# Patient Record
Sex: FEMALE | Race: WHITE | Employment: UNEMPLOYED
[De-identification: names, ages, dates, MRNs, and addresses within clinical notes are randomized per-mention and may not be internally consistent; named-entity substitution may affect disease eponyms.]

---

## 2017-02-21 ENCOUNTER — TELEPHONE (OUTPATIENT)
Dept: FAMILY MEDICINE CLINIC | Facility: CLINIC | Age: 57
End: 2017-02-21

## 2017-03-01 RX ORDER — DICYCLOMINE HYDROCHLORIDE 10 MG/1
10 CAPSULE ORAL EVERY 6 HOURS PRN
Qty: 20 CAPSULE | Refills: 0 | Status: CANCELLED | OUTPATIENT
Start: 2017-03-01

## 2017-03-07 ENCOUNTER — OFFICE VISIT (OUTPATIENT)
Dept: FAMILY MEDICINE CLINIC | Facility: CLINIC | Age: 57
End: 2017-03-07

## 2017-03-07 VITALS
TEMPERATURE: 98.8 F | DIASTOLIC BLOOD PRESSURE: 70 MMHG | BODY MASS INDEX: 32.65 KG/M2 | WEIGHT: 208 LBS | HEIGHT: 67 IN | HEART RATE: 60 BPM | OXYGEN SATURATION: 96 % | RESPIRATION RATE: 16 BRPM | SYSTOLIC BLOOD PRESSURE: 100 MMHG

## 2017-03-07 DIAGNOSIS — K05.10 GINGIVITIS, CHRONIC: ICD-10-CM

## 2017-03-07 DIAGNOSIS — M54.42 CHRONIC BILATERAL LOW BACK PAIN WITH BILATERAL SCIATICA: Primary | ICD-10-CM

## 2017-03-07 DIAGNOSIS — F32.A DEPRESSION, UNSPECIFIED DEPRESSION TYPE: ICD-10-CM

## 2017-03-07 DIAGNOSIS — F41.1 GENERALIZED ANXIETY DISORDER: Chronic | ICD-10-CM

## 2017-03-07 DIAGNOSIS — G89.29 CHRONIC BILATERAL LOW BACK PAIN WITH BILATERAL SCIATICA: Primary | ICD-10-CM

## 2017-03-07 DIAGNOSIS — M54.41 CHRONIC BILATERAL LOW BACK PAIN WITH BILATERAL SCIATICA: Primary | ICD-10-CM

## 2017-03-07 DIAGNOSIS — R10.84 GENERALIZED ABDOMINAL PAIN: ICD-10-CM

## 2017-03-07 PROCEDURE — 99214 OFFICE O/P EST MOD 30 MIN: CPT | Performed by: FAMILY MEDICINE

## 2017-03-07 RX ORDER — DICYCLOMINE HYDROCHLORIDE 10 MG/1
10 CAPSULE ORAL 3 TIMES DAILY PRN
Qty: 90 CAPSULE | Refills: 2 | Status: SHIPPED | OUTPATIENT
Start: 2017-03-07 | End: 2017-05-11 | Stop reason: SDUPTHER

## 2017-03-07 RX ORDER — TRAZODONE HYDROCHLORIDE 100 MG/1
100 TABLET ORAL
COMMUNITY
End: 2017-04-13 | Stop reason: SDUPTHER

## 2017-03-07 RX ORDER — DICYCLOMINE HCL 20 MG
20 TABLET ORAL
COMMUNITY
End: 2017-03-07 | Stop reason: SDUPTHER

## 2017-03-07 RX ORDER — SERTRALINE HYDROCHLORIDE 100 MG/1
100 TABLET, FILM COATED ORAL DAILY
COMMUNITY
End: 2017-05-11 | Stop reason: SDUPTHER

## 2017-03-07 RX ORDER — TIZANIDINE 4 MG/1
4 TABLET ORAL EVERY 8 HOURS PRN
COMMUNITY
End: 2017-05-11 | Stop reason: SDUPTHER

## 2017-03-07 RX ORDER — CLINDAMYCIN HYDROCHLORIDE 150 MG/1
1 CAPSULE ORAL 3 TIMES DAILY
COMMUNITY
Start: 2017-02-21 | End: 2017-03-07 | Stop reason: ALTCHOICE

## 2017-03-07 RX ORDER — DICYCLOMINE HCL 20 MG
20 TABLET ORAL 3 TIMES DAILY PRN
Qty: 90 TABLET | Refills: 2 | Status: SHIPPED | OUTPATIENT
Start: 2017-03-07 | End: 2017-05-11 | Stop reason: SDUPTHER

## 2017-03-07 RX ORDER — DICYCLOMINE HYDROCHLORIDE 10 MG/1
CAPSULE ORAL
COMMUNITY
Start: 2017-02-22 | End: 2017-03-07 | Stop reason: SDUPTHER

## 2017-03-07 ASSESSMENT — PATIENT HEALTH QUESTIONNAIRE - PHQ9
2. FEELING DOWN, DEPRESSED OR HOPELESS: 3
7. TROUBLE CONCENTRATING ON THINGS, SUCH AS READING THE NEWSPAPER OR WATCHING TELEVISION: 3
6. FEELING BAD ABOUT YOURSELF - OR THAT YOU ARE A FAILURE OR HAVE LET YOURSELF OR YOUR FAMILY DOWN: 0
1. LITTLE INTEREST OR PLEASURE IN DOING THINGS: 3
10. IF YOU CHECKED OFF ANY PROBLEMS, HOW DIFFICULT HAVE THESE PROBLEMS MADE IT FOR YOU TO DO YOUR WORK, TAKE CARE OF THINGS AT HOME, OR GET ALONG WITH OTHER PEOPLE: 1
5. POOR APPETITE OR OVEREATING: 3
8. MOVING OR SPEAKING SO SLOWLY THAT OTHER PEOPLE COULD HAVE NOTICED. OR THE OPPOSITE, BEING SO FIGETY OR RESTLESS THAT YOU HAVE BEEN MOVING AROUND A LOT MORE THAN USUAL: 3
SUM OF ALL RESPONSES TO PHQ QUESTIONS 1-9: 21
3. TROUBLE FALLING OR STAYING ASLEEP: 3
4. FEELING TIRED OR HAVING LITTLE ENERGY: 3
9. THOUGHTS THAT YOU WOULD BE BETTER OFF DEAD, OR OF HURTING YOURSELF: 0
SUM OF ALL RESPONSES TO PHQ9 QUESTIONS 1 & 2: 6

## 2017-03-07 ASSESSMENT — ENCOUNTER SYMPTOMS
BLOOD IN STOOL: 0
COUGH: 0
BACK PAIN: 1
TROUBLE SWALLOWING: 0
RHINORRHEA: 0
SHORTNESS OF BREATH: 0
ABDOMINAL PAIN: 1
NAUSEA: 1
DIARRHEA: 1
WHEEZING: 0
CONSTIPATION: 0
SORE THROAT: 0
VOMITING: 0

## 2017-04-04 ENCOUNTER — OFFICE VISIT (OUTPATIENT)
Dept: GASTROENTEROLOGY | Age: 57
End: 2017-04-04
Payer: COMMERCIAL

## 2017-04-04 VITALS
BODY MASS INDEX: 32.49 KG/M2 | WEIGHT: 207 LBS | OXYGEN SATURATION: 93 % | TEMPERATURE: 97.3 F | HEIGHT: 67 IN | RESPIRATION RATE: 14 BRPM | DIASTOLIC BLOOD PRESSURE: 74 MMHG | HEART RATE: 64 BPM | SYSTOLIC BLOOD PRESSURE: 117 MMHG

## 2017-04-04 DIAGNOSIS — R10.32 LEFT LOWER QUADRANT PAIN: ICD-10-CM

## 2017-04-04 DIAGNOSIS — R10.10 PAIN OF UPPER ABDOMEN: Primary | ICD-10-CM

## 2017-04-04 DIAGNOSIS — R63.4 WEIGHT LOSS: ICD-10-CM

## 2017-04-04 DIAGNOSIS — R93.5 ABNORMAL ABDOMINAL CT SCAN: ICD-10-CM

## 2017-04-04 DIAGNOSIS — Z12.11 COLON CANCER SCREENING: ICD-10-CM

## 2017-04-04 PROCEDURE — 99244 OFF/OP CNSLTJ NEW/EST MOD 40: CPT | Performed by: INTERNAL MEDICINE

## 2017-04-04 ASSESSMENT — ENCOUNTER SYMPTOMS
ABDOMINAL DISTENTION: 1
CONSTIPATION: 0
ABDOMINAL PAIN: 1
ANAL BLEEDING: 0
NAUSEA: 1
DIARRHEA: 1
ALLERGIC/IMMUNOLOGIC NEGATIVE: 1
RESPIRATORY NEGATIVE: 1
BLOOD IN STOOL: 0
RECTAL PAIN: 0
BACK PAIN: 1
EYES NEGATIVE: 1
VOMITING: 0
SINUS PRESSURE: 1

## 2017-04-05 ENCOUNTER — TELEPHONE (OUTPATIENT)
Dept: FAMILY MEDICINE CLINIC | Age: 57
End: 2017-04-05

## 2017-04-05 DIAGNOSIS — M54.41 CHRONIC BILATERAL LOW BACK PAIN WITH BILATERAL SCIATICA: Primary | ICD-10-CM

## 2017-04-05 DIAGNOSIS — M54.42 CHRONIC BILATERAL LOW BACK PAIN WITH BILATERAL SCIATICA: Primary | ICD-10-CM

## 2017-04-05 DIAGNOSIS — G89.29 CHRONIC BILATERAL LOW BACK PAIN WITH BILATERAL SCIATICA: Primary | ICD-10-CM

## 2017-04-05 RX ORDER — POLYETHYLENE GLYCOL 3350 17 G/17G
POWDER, FOR SOLUTION ORAL
Qty: 255 G | Refills: 0 | Status: SHIPPED | OUTPATIENT
Start: 2017-04-05 | End: 2017-05-01

## 2017-04-06 PROBLEM — G89.29 CHRONIC BILATERAL LOW BACK PAIN WITH BILATERAL SCIATICA: Status: ACTIVE | Noted: 2017-04-06

## 2017-04-06 PROBLEM — M54.42 CHRONIC BILATERAL LOW BACK PAIN WITH BILATERAL SCIATICA: Status: ACTIVE | Noted: 2017-04-06

## 2017-04-06 PROBLEM — M54.41 CHRONIC BILATERAL LOW BACK PAIN WITH BILATERAL SCIATICA: Status: ACTIVE | Noted: 2017-04-06

## 2017-04-13 ENCOUNTER — OFFICE VISIT (OUTPATIENT)
Dept: FAMILY MEDICINE CLINIC | Age: 57
End: 2017-04-13
Payer: COMMERCIAL

## 2017-04-13 ENCOUNTER — HOSPITAL ENCOUNTER (OUTPATIENT)
Age: 57
Setting detail: SPECIMEN
Discharge: HOME | End: 2017-04-13
Payer: COMMERCIAL

## 2017-04-13 VITALS
HEART RATE: 64 BPM | OXYGEN SATURATION: 95 % | HEIGHT: 67 IN | BODY MASS INDEX: 32.65 KG/M2 | RESPIRATION RATE: 14 BRPM | TEMPERATURE: 97.9 F | SYSTOLIC BLOOD PRESSURE: 136 MMHG | WEIGHT: 208 LBS | DIASTOLIC BLOOD PRESSURE: 86 MMHG

## 2017-04-13 DIAGNOSIS — G47.00 INSOMNIA, UNSPECIFIED TYPE: ICD-10-CM

## 2017-04-13 DIAGNOSIS — R31.29 MICROSCOPIC HEMATURIA: ICD-10-CM

## 2017-04-13 DIAGNOSIS — N30.00 ACUTE CYSTITIS WITHOUT HEMATURIA: Primary | ICD-10-CM

## 2017-04-13 DIAGNOSIS — R30.0 DYSURIA: ICD-10-CM

## 2017-04-13 LAB
BILIRUBIN, POC: NEGATIVE
BLOOD URINE, POC: ABNORMAL
CLARITY, POC: ABNORMAL
COLOR, POC: ABNORMAL
GLUCOSE URINE, POC: NEGATIVE
KETONES, POC: NEGATIVE
LEUKOCYTE EST, POC: ABNORMAL
NITRITE, POC: NEGATIVE
PH, POC: 7
PROTEIN, POC: ABNORMAL
SPECIFIC GRAVITY, POC: 1
UROBILINOGEN, POC: NEGATIVE

## 2017-04-13 PROCEDURE — 81002 URINALYSIS NONAUTO W/O SCOPE: CPT | Performed by: FAMILY MEDICINE

## 2017-04-13 PROCEDURE — 99214 OFFICE O/P EST MOD 30 MIN: CPT | Performed by: FAMILY MEDICINE

## 2017-04-13 RX ORDER — TRAZODONE HYDROCHLORIDE 100 MG/1
100 TABLET ORAL NIGHTLY
Qty: 30 TABLET | Refills: 1 | Status: SHIPPED | OUTPATIENT
Start: 2017-04-13 | End: 2017-05-11 | Stop reason: SDUPTHER

## 2017-04-13 RX ORDER — SULFAMETHOXAZOLE AND TRIMETHOPRIM 800; 160 MG/1; MG/1
1 TABLET ORAL 2 TIMES DAILY
Qty: 20 TABLET | Refills: 0 | Status: SHIPPED | OUTPATIENT
Start: 2017-04-13 | End: 2017-04-23

## 2017-04-13 ASSESSMENT — ENCOUNTER SYMPTOMS
ABDOMINAL PAIN: 1
WHEEZING: 0
VOMITING: 0
COUGH: 0
SHORTNESS OF BREATH: 0
NAUSEA: 1
CONSTIPATION: 0
DIARRHEA: 1
BACK PAIN: 1

## 2017-04-15 LAB
CULTURE: ABNORMAL
CULTURE: ABNORMAL
Lab: ABNORMAL
ORGANISM: ABNORMAL
SPECIMEN DESCRIPTION: ABNORMAL
STATUS: ABNORMAL

## 2017-04-16 ASSESSMENT — ENCOUNTER SYMPTOMS
RHINORRHEA: 0
SHORTNESS OF BREATH: 0
WHEEZING: 0
BLOOD IN STOOL: 1
CONSTIPATION: 0
NAUSEA: 1
ABDOMINAL PAIN: 1
VOMITING: 0
SORE THROAT: 0
BACK PAIN: 1
DIARRHEA: 1
COUGH: 0

## 2017-04-24 ENCOUNTER — ANESTHESIA EVENT (OUTPATIENT)
Dept: OPERATING ROOM | Facility: CLINIC | Age: 57
End: 2017-04-24
Payer: COMMERCIAL

## 2017-04-24 ENCOUNTER — HOSPITAL ENCOUNTER (OUTPATIENT)
Age: 57
Setting detail: SPECIMEN
Discharge: HOME | End: 2017-04-24
Payer: COMMERCIAL

## 2017-04-24 ENCOUNTER — ANESTHESIA (OUTPATIENT)
Dept: OPERATING ROOM | Facility: CLINIC | Age: 57
End: 2017-04-24
Payer: COMMERCIAL

## 2017-04-24 ENCOUNTER — HOSPITAL ENCOUNTER (OUTPATIENT)
Facility: CLINIC | Age: 57
Setting detail: OUTPATIENT SURGERY
Discharge: HOME | End: 2017-04-24
Attending: INTERNAL MEDICINE | Admitting: INTERNAL MEDICINE
Payer: COMMERCIAL

## 2017-04-24 VITALS
RESPIRATION RATE: 16 BRPM | SYSTOLIC BLOOD PRESSURE: 100 MMHG | DIASTOLIC BLOOD PRESSURE: 47 MMHG | OXYGEN SATURATION: 97 %

## 2017-04-24 VITALS
RESPIRATION RATE: 16 BRPM | SYSTOLIC BLOOD PRESSURE: 128 MMHG | BODY MASS INDEX: 28.79 KG/M2 | WEIGHT: 190 LBS | HEART RATE: 62 BPM | DIASTOLIC BLOOD PRESSURE: 68 MMHG | HEIGHT: 68 IN | OXYGEN SATURATION: 96 % | TEMPERATURE: 97.9 F

## 2017-04-24 PROCEDURE — 2500000003 HC RX 250 WO HCPCS: Performed by: NURSE ANESTHETIST, CERTIFIED REGISTERED

## 2017-04-24 PROCEDURE — 2580000003 HC RX 258: Performed by: ANESTHESIOLOGY

## 2017-04-24 PROCEDURE — 3609010300 HC COLONOSCOPY W/BIOPSY SINGLE/MULTIPLE: Performed by: INTERNAL MEDICINE

## 2017-04-24 PROCEDURE — 7100000010 HC PHASE II RECOVERY - FIRST 15 MIN: Performed by: INTERNAL MEDICINE

## 2017-04-24 PROCEDURE — 6360000002 HC RX W HCPCS: Performed by: NURSE ANESTHETIST, CERTIFIED REGISTERED

## 2017-04-24 PROCEDURE — 7100000000 HC PACU RECOVERY - FIRST 15 MIN: Performed by: INTERNAL MEDICINE

## 2017-04-24 PROCEDURE — 3700000001 HC ADD 15 MINUTES (ANESTHESIA): Performed by: INTERNAL MEDICINE

## 2017-04-24 PROCEDURE — 7100000001 HC PACU RECOVERY - ADDTL 15 MIN: Performed by: INTERNAL MEDICINE

## 2017-04-24 PROCEDURE — 3700000000 HC ANESTHESIA ATTENDED CARE: Performed by: INTERNAL MEDICINE

## 2017-04-24 RX ORDER — SODIUM CHLORIDE 0.9 % (FLUSH) 0.9 %
10 SYRINGE (ML) INJECTION PRN
Status: DISCONTINUED | OUTPATIENT
Start: 2017-04-24 | End: 2017-04-24 | Stop reason: HOSPADM

## 2017-04-24 RX ORDER — SODIUM CHLORIDE, SODIUM LACTATE, POTASSIUM CHLORIDE, CALCIUM CHLORIDE 600; 310; 30; 20 MG/100ML; MG/100ML; MG/100ML; MG/100ML
INJECTION, SOLUTION INTRAVENOUS CONTINUOUS
Status: DISCONTINUED | OUTPATIENT
Start: 2017-04-24 | End: 2017-04-24 | Stop reason: HOSPADM

## 2017-04-24 RX ORDER — SODIUM CHLORIDE 0.9 % (FLUSH) 0.9 %
10 SYRINGE (ML) INJECTION EVERY 12 HOURS SCHEDULED
Status: DISCONTINUED | OUTPATIENT
Start: 2017-04-24 | End: 2017-04-24 | Stop reason: HOSPADM

## 2017-04-24 RX ORDER — PROMETHAZINE HYDROCHLORIDE 25 MG/ML
12.5 INJECTION, SOLUTION INTRAMUSCULAR; INTRAVENOUS
Status: DISCONTINUED | OUTPATIENT
Start: 2017-04-24 | End: 2017-04-24 | Stop reason: HOSPADM

## 2017-04-24 RX ORDER — ONDANSETRON 2 MG/ML
4 INJECTION INTRAMUSCULAR; INTRAVENOUS
Status: DISCONTINUED | OUTPATIENT
Start: 2017-04-24 | End: 2017-04-24 | Stop reason: HOSPADM

## 2017-04-24 RX ORDER — LIDOCAINE HYDROCHLORIDE 10 MG/ML
INJECTION, SOLUTION EPIDURAL; INFILTRATION; INTRACAUDAL; PERINEURAL PRN
Status: DISCONTINUED | OUTPATIENT
Start: 2017-04-24 | End: 2017-04-24 | Stop reason: SDUPTHER

## 2017-04-24 RX ORDER — PROPOFOL 10 MG/ML
INJECTION, EMULSION INTRAVENOUS PRN
Status: DISCONTINUED | OUTPATIENT
Start: 2017-04-24 | End: 2017-04-24 | Stop reason: SDUPTHER

## 2017-04-24 RX ORDER — DIPHENHYDRAMINE HYDROCHLORIDE 50 MG/ML
12.5 INJECTION INTRAMUSCULAR; INTRAVENOUS
Status: DISCONTINUED | OUTPATIENT
Start: 2017-04-24 | End: 2017-04-24 | Stop reason: HOSPADM

## 2017-04-24 RX ADMIN — PROPOFOL 100 MG: 10 INJECTION, EMULSION INTRAVENOUS at 11:55

## 2017-04-24 RX ADMIN — PROPOFOL 100 MG: 10 INJECTION, EMULSION INTRAVENOUS at 11:45

## 2017-04-24 RX ADMIN — SODIUM CHLORIDE, POTASSIUM CHLORIDE, SODIUM LACTATE AND CALCIUM CHLORIDE: 600; 310; 30; 20 INJECTION, SOLUTION INTRAVENOUS at 11:35

## 2017-04-24 RX ADMIN — PROPOFOL 100 MG: 10 INJECTION, EMULSION INTRAVENOUS at 12:00

## 2017-04-24 RX ADMIN — LIDOCAINE HYDROCHLORIDE 50 MG: 10 INJECTION, SOLUTION EPIDURAL; INFILTRATION; INTRACAUDAL; PERINEURAL at 11:45

## 2017-04-24 RX ADMIN — PROPOFOL 100 MG: 10 INJECTION, EMULSION INTRAVENOUS at 11:50

## 2017-04-24 RX ADMIN — PROPOFOL 100 MG: 10 INJECTION, EMULSION INTRAVENOUS at 12:03

## 2017-04-24 RX ADMIN — SODIUM CHLORIDE, POTASSIUM CHLORIDE, SODIUM LACTATE AND CALCIUM CHLORIDE: 600; 310; 30; 20 INJECTION, SOLUTION INTRAVENOUS at 11:22

## 2017-04-24 ASSESSMENT — PAIN SCALES - GENERAL
PAINLEVEL_OUTOF10: 10
PAINLEVEL_OUTOF10: 8
PAINLEVEL_OUTOF10: 2
PAINLEVEL_OUTOF10: 7
PAINLEVEL_OUTOF10: 10

## 2017-04-24 ASSESSMENT — PAIN - FUNCTIONAL ASSESSMENT: PAIN_FUNCTIONAL_ASSESSMENT: 0-10

## 2017-04-26 LAB — SURGICAL PATHOLOGY REPORT: NORMAL

## 2017-05-01 ENCOUNTER — OFFICE VISIT (OUTPATIENT)
Dept: GASTROENTEROLOGY | Age: 57
End: 2017-05-01
Payer: COMMERCIAL

## 2017-05-01 VITALS
DIASTOLIC BLOOD PRESSURE: 80 MMHG | OXYGEN SATURATION: 94 % | RESPIRATION RATE: 14 BRPM | HEIGHT: 67 IN | WEIGHT: 206 LBS | TEMPERATURE: 98 F | SYSTOLIC BLOOD PRESSURE: 138 MMHG | HEART RATE: 55 BPM | BODY MASS INDEX: 32.33 KG/M2

## 2017-05-01 DIAGNOSIS — R10.32 LEFT LOWER QUADRANT PAIN: ICD-10-CM

## 2017-05-01 DIAGNOSIS — R10.10 PAIN OF UPPER ABDOMEN: Primary | ICD-10-CM

## 2017-05-01 DIAGNOSIS — D36.9 ADENOMATOUS POLYP: ICD-10-CM

## 2017-05-01 PROCEDURE — 99214 OFFICE O/P EST MOD 30 MIN: CPT | Performed by: INTERNAL MEDICINE

## 2017-05-01 ASSESSMENT — ENCOUNTER SYMPTOMS
CONSTIPATION: 0
SINUS PRESSURE: 1
ALLERGIC/IMMUNOLOGIC NEGATIVE: 1
DIARRHEA: 1
NAUSEA: 0
VOMITING: 0
ABDOMINAL PAIN: 1
BACK PAIN: 1
ABDOMINAL DISTENTION: 1
RECTAL PAIN: 0
BLOOD IN STOOL: 1
ANAL BLEEDING: 0
RESPIRATORY NEGATIVE: 1

## 2017-05-02 ENCOUNTER — HOSPITAL ENCOUNTER (OUTPATIENT)
Age: 57
Setting detail: OUTPATIENT SURGERY
Discharge: HOME | End: 2017-05-02
Attending: INTERNAL MEDICINE | Admitting: INTERNAL MEDICINE
Payer: COMMERCIAL

## 2017-05-02 VITALS
DIASTOLIC BLOOD PRESSURE: 56 MMHG | HEART RATE: 57 BPM | TEMPERATURE: 96.8 F | OXYGEN SATURATION: 95 % | SYSTOLIC BLOOD PRESSURE: 110 MMHG | RESPIRATION RATE: 16 BRPM

## 2017-05-02 PROCEDURE — 7100000011 HC PHASE II RECOVERY - ADDTL 15 MIN: Performed by: INTERNAL MEDICINE

## 2017-05-02 PROCEDURE — 6370000000 HC RX 637 (ALT 250 FOR IP): Performed by: INTERNAL MEDICINE

## 2017-05-02 PROCEDURE — 6360000002 HC RX W HCPCS: Performed by: INTERNAL MEDICINE

## 2017-05-02 PROCEDURE — 99152 MOD SED SAME PHYS/QHP 5/>YRS: CPT | Performed by: INTERNAL MEDICINE

## 2017-05-02 PROCEDURE — 3609012400 HC EGD TRANSORAL BIOPSY SINGLE/MULTIPLE: Performed by: INTERNAL MEDICINE

## 2017-05-02 PROCEDURE — 99153 MOD SED SAME PHYS/QHP EA: CPT | Performed by: INTERNAL MEDICINE

## 2017-05-02 PROCEDURE — 88305 TISSUE EXAM BY PATHOLOGIST: CPT

## 2017-05-02 PROCEDURE — 7100000010 HC PHASE II RECOVERY - FIRST 15 MIN: Performed by: INTERNAL MEDICINE

## 2017-05-02 RX ORDER — MIDAZOLAM HYDROCHLORIDE 1 MG/ML
INJECTION INTRAMUSCULAR; INTRAVENOUS PRN
Status: DISCONTINUED | OUTPATIENT
Start: 2017-05-02 | End: 2017-05-02 | Stop reason: HOSPADM

## 2017-05-02 RX ORDER — SODIUM CHLORIDE, SODIUM LACTATE, POTASSIUM CHLORIDE, CALCIUM CHLORIDE 600; 310; 30; 20 MG/100ML; MG/100ML; MG/100ML; MG/100ML
INJECTION, SOLUTION INTRAVENOUS CONTINUOUS
Status: DISCONTINUED | OUTPATIENT
Start: 2017-05-02 | End: 2017-05-02 | Stop reason: HOSPADM

## 2017-05-02 RX ORDER — SODIUM, POTASSIUM,MAG SULFATES 17.5-3.13G
SOLUTION, RECONSTITUTED, ORAL ORAL
Qty: 1 BOTTLE | Refills: 0 | Status: SHIPPED | OUTPATIENT
Start: 2017-05-02 | End: 2018-02-19 | Stop reason: ALTCHOICE

## 2017-05-02 RX ORDER — MEPERIDINE HYDROCHLORIDE 50 MG/ML
INJECTION INTRAMUSCULAR; INTRAVENOUS; SUBCUTANEOUS PRN
Status: DISCONTINUED | OUTPATIENT
Start: 2017-05-02 | End: 2017-05-02 | Stop reason: HOSPADM

## 2017-05-02 ASSESSMENT — PAIN DESCRIPTION - PAIN TYPE: TYPE: CHRONIC PAIN

## 2017-05-02 ASSESSMENT — PAIN SCALES - GENERAL
PAINLEVEL_OUTOF10: 4
PAINLEVEL_OUTOF10: 0
PAINLEVEL_OUTOF10: 4

## 2017-05-03 LAB — SURGICAL PATHOLOGY REPORT: NORMAL

## 2017-05-04 ENCOUNTER — HOSPITAL ENCOUNTER (OUTPATIENT)
Age: 57
Setting detail: OUTPATIENT SURGERY
Discharge: HOME | End: 2017-05-04
Attending: INTERNAL MEDICINE | Admitting: INTERNAL MEDICINE
Payer: COMMERCIAL

## 2017-05-04 VITALS
RESPIRATION RATE: 20 BRPM | TEMPERATURE: 97.7 F | DIASTOLIC BLOOD PRESSURE: 88 MMHG | WEIGHT: 200 LBS | OXYGEN SATURATION: 92 % | SYSTOLIC BLOOD PRESSURE: 112 MMHG | HEIGHT: 67 IN | BODY MASS INDEX: 31.39 KG/M2 | HEART RATE: 56 BPM

## 2017-05-04 PROCEDURE — 88305 TISSUE EXAM BY PATHOLOGIST: CPT

## 2017-05-04 PROCEDURE — 2580000003 HC RX 258: Performed by: INTERNAL MEDICINE

## 2017-05-04 PROCEDURE — 99152 MOD SED SAME PHYS/QHP 5/>YRS: CPT | Performed by: INTERNAL MEDICINE

## 2017-05-04 PROCEDURE — 7100000010 HC PHASE II RECOVERY - FIRST 15 MIN: Performed by: INTERNAL MEDICINE

## 2017-05-04 PROCEDURE — 6360000002 HC RX W HCPCS: Performed by: INTERNAL MEDICINE

## 2017-05-04 PROCEDURE — 7100000011 HC PHASE II RECOVERY - ADDTL 15 MIN: Performed by: INTERNAL MEDICINE

## 2017-05-04 PROCEDURE — 3609010300 HC COLONOSCOPY W/BIOPSY SINGLE/MULTIPLE: Performed by: INTERNAL MEDICINE

## 2017-05-04 PROCEDURE — 99153 MOD SED SAME PHYS/QHP EA: CPT | Performed by: INTERNAL MEDICINE

## 2017-05-04 RX ORDER — MEPERIDINE HYDROCHLORIDE 50 MG/ML
INJECTION INTRAMUSCULAR; INTRAVENOUS; SUBCUTANEOUS PRN
Status: DISCONTINUED | OUTPATIENT
Start: 2017-05-04 | End: 2017-05-04 | Stop reason: HOSPADM

## 2017-05-04 RX ORDER — SODIUM CHLORIDE, SODIUM LACTATE, POTASSIUM CHLORIDE, CALCIUM CHLORIDE 600; 310; 30; 20 MG/100ML; MG/100ML; MG/100ML; MG/100ML
INJECTION, SOLUTION INTRAVENOUS CONTINUOUS
Status: DISCONTINUED | OUTPATIENT
Start: 2017-05-04 | End: 2017-05-04 | Stop reason: HOSPADM

## 2017-05-04 RX ORDER — MIDAZOLAM HYDROCHLORIDE 1 MG/ML
INJECTION INTRAMUSCULAR; INTRAVENOUS PRN
Status: DISCONTINUED | OUTPATIENT
Start: 2017-05-04 | End: 2017-05-04 | Stop reason: HOSPADM

## 2017-05-04 RX ADMIN — SODIUM CHLORIDE, POTASSIUM CHLORIDE, SODIUM LACTATE AND CALCIUM CHLORIDE: 600; 310; 30; 20 INJECTION, SOLUTION INTRAVENOUS at 11:58

## 2017-05-04 ASSESSMENT — PAIN DESCRIPTION - DESCRIPTORS: DESCRIPTORS: ACHING

## 2017-05-04 ASSESSMENT — PAIN SCALES - GENERAL: PAINLEVEL_OUTOF10: 0

## 2017-05-04 ASSESSMENT — PAIN - FUNCTIONAL ASSESSMENT: PAIN_FUNCTIONAL_ASSESSMENT: 0-10

## 2017-05-05 LAB — SURGICAL PATHOLOGY REPORT: NORMAL

## 2017-05-08 DIAGNOSIS — R63.4 WEIGHT LOSS: ICD-10-CM

## 2017-05-08 DIAGNOSIS — R10.10 PAIN OF UPPER ABDOMEN: ICD-10-CM

## 2017-05-08 DIAGNOSIS — Z12.11 COLON CANCER SCREENING: ICD-10-CM

## 2017-05-08 DIAGNOSIS — R10.32 LEFT LOWER QUADRANT PAIN: ICD-10-CM

## 2017-05-11 DIAGNOSIS — K29.90 GASTRODUODENITIS: Primary | ICD-10-CM

## 2017-05-11 DIAGNOSIS — R10.84 GENERALIZED ABDOMINAL PAIN: ICD-10-CM

## 2017-05-11 DIAGNOSIS — G47.00 INSOMNIA, UNSPECIFIED TYPE: ICD-10-CM

## 2017-05-12 DIAGNOSIS — R10.84 GENERALIZED ABDOMINAL PAIN: ICD-10-CM

## 2017-05-12 RX ORDER — TRAZODONE HYDROCHLORIDE 100 MG/1
100 TABLET ORAL NIGHTLY
Qty: 90 TABLET | Refills: 1 | Status: SHIPPED | OUTPATIENT
Start: 2017-05-12 | End: 2017-10-20 | Stop reason: SDUPTHER

## 2017-05-12 RX ORDER — DICYCLOMINE HCL 20 MG
20 TABLET ORAL 3 TIMES DAILY PRN
Qty: 270 TABLET | Refills: 1 | Status: SHIPPED | OUTPATIENT
Start: 2017-05-12 | End: 2017-05-12 | Stop reason: SDUPTHER

## 2017-05-12 RX ORDER — DICYCLOMINE HYDROCHLORIDE 10 MG/1
10 CAPSULE ORAL 3 TIMES DAILY PRN
Qty: 270 CAPSULE | Refills: 1 | Status: SHIPPED | OUTPATIENT
Start: 2017-05-12 | End: 2017-05-12 | Stop reason: SDUPTHER

## 2017-05-12 RX ORDER — SERTRALINE HYDROCHLORIDE 100 MG/1
100 TABLET, FILM COATED ORAL DAILY
Qty: 90 TABLET | Refills: 1 | Status: SHIPPED | OUTPATIENT
Start: 2017-05-12 | End: 2017-10-20 | Stop reason: SDUPTHER

## 2017-05-12 RX ORDER — DICYCLOMINE HCL 20 MG
TABLET ORAL
Qty: 270 TABLET | Refills: 1 | Status: SHIPPED | OUTPATIENT
Start: 2017-05-12 | End: 2017-10-20 | Stop reason: SDUPTHER

## 2017-05-12 RX ORDER — TIZANIDINE 4 MG/1
4 TABLET ORAL EVERY 8 HOURS PRN
Qty: 270 TABLET | Refills: 1 | Status: SHIPPED | OUTPATIENT
Start: 2017-05-12 | End: 2017-10-20 | Stop reason: SDUPTHER

## 2017-05-12 RX ORDER — DICYCLOMINE HYDROCHLORIDE 10 MG/1
CAPSULE ORAL
Qty: 270 CAPSULE | Refills: 1 | Status: SHIPPED | OUTPATIENT
Start: 2017-05-12 | End: 2017-10-20 | Stop reason: SDUPTHER

## 2017-05-12 RX ORDER — OMEPRAZOLE 40 MG/1
40 CAPSULE, DELAYED RELEASE ORAL DAILY
Qty: 90 CAPSULE | Refills: 1 | Status: SHIPPED | OUTPATIENT
Start: 2017-05-12 | End: 2017-10-20 | Stop reason: SDUPTHER

## 2017-07-27 DIAGNOSIS — R10.32 LEFT LOWER QUADRANT PAIN: ICD-10-CM

## 2017-07-27 DIAGNOSIS — D36.9 ADENOMATOUS POLYP: ICD-10-CM

## 2017-08-08 ENCOUNTER — TELEPHONE (OUTPATIENT)
Dept: GASTROENTEROLOGY | Age: 57
End: 2017-08-08

## 2017-10-20 DIAGNOSIS — R10.84 GENERALIZED ABDOMINAL PAIN: ICD-10-CM

## 2017-10-20 DIAGNOSIS — K29.90 GASTRODUODENITIS: ICD-10-CM

## 2017-10-20 DIAGNOSIS — G47.00 INSOMNIA, UNSPECIFIED TYPE: ICD-10-CM

## 2017-10-20 RX ORDER — DICYCLOMINE HYDROCHLORIDE 10 MG/1
CAPSULE ORAL
Qty: 270 CAPSULE | Refills: 0 | Status: SHIPPED | OUTPATIENT
Start: 2017-10-20 | End: 2018-01-17 | Stop reason: SDUPTHER

## 2017-10-20 RX ORDER — TRAZODONE HYDROCHLORIDE 100 MG/1
100 TABLET ORAL NIGHTLY
Qty: 90 TABLET | Refills: 0 | Status: SHIPPED | OUTPATIENT
Start: 2017-10-20 | End: 2018-01-17 | Stop reason: SDUPTHER

## 2017-10-20 RX ORDER — OMEPRAZOLE 40 MG/1
40 CAPSULE, DELAYED RELEASE ORAL DAILY
Qty: 90 CAPSULE | Refills: 0 | Status: SHIPPED | OUTPATIENT
Start: 2017-10-20 | End: 2018-01-17 | Stop reason: SDUPTHER

## 2017-10-20 RX ORDER — DICYCLOMINE HCL 20 MG
TABLET ORAL
Qty: 270 TABLET | Refills: 0 | Status: SHIPPED | OUTPATIENT
Start: 2017-10-20 | End: 2018-01-17 | Stop reason: SDUPTHER

## 2017-10-20 RX ORDER — TIZANIDINE 4 MG/1
4 TABLET ORAL EVERY 8 HOURS PRN
Qty: 270 TABLET | Refills: 0 | Status: SHIPPED | OUTPATIENT
Start: 2017-10-20 | End: 2018-02-19 | Stop reason: SDUPTHER

## 2017-10-20 RX ORDER — SERTRALINE HYDROCHLORIDE 100 MG/1
100 TABLET, FILM COATED ORAL DAILY
Qty: 90 TABLET | Refills: 0 | Status: SHIPPED | OUTPATIENT
Start: 2017-10-20 | End: 2017-12-30 | Stop reason: SDUPTHER

## 2018-02-19 ENCOUNTER — OFFICE VISIT (OUTPATIENT)
Dept: FAMILY MEDICINE CLINIC | Age: 58
End: 2018-02-19
Payer: COMMERCIAL

## 2018-02-19 VITALS
SYSTOLIC BLOOD PRESSURE: 115 MMHG | OXYGEN SATURATION: 93 % | DIASTOLIC BLOOD PRESSURE: 77 MMHG | RESPIRATION RATE: 16 BRPM | BODY MASS INDEX: 37.2 KG/M2 | HEART RATE: 61 BPM | WEIGHT: 237 LBS | TEMPERATURE: 98.4 F | HEIGHT: 67 IN

## 2018-02-19 DIAGNOSIS — E55.9 VITAMIN D DEFICIENCY: ICD-10-CM

## 2018-02-19 DIAGNOSIS — E78.5 HYPERLIPIDEMIA, UNSPECIFIED HYPERLIPIDEMIA TYPE: ICD-10-CM

## 2018-02-19 DIAGNOSIS — Z12.39 SCREENING FOR BREAST CANCER: ICD-10-CM

## 2018-02-19 DIAGNOSIS — Z11.4 SCREENING FOR HIV WITHOUT PRESENCE OF RISK FACTORS: ICD-10-CM

## 2018-02-19 DIAGNOSIS — Z13.820 SCREENING FOR OSTEOPOROSIS: ICD-10-CM

## 2018-02-19 DIAGNOSIS — Z23 NEED FOR INFLUENZA VACCINATION: ICD-10-CM

## 2018-02-19 DIAGNOSIS — F41.9 ANXIETY: ICD-10-CM

## 2018-02-19 DIAGNOSIS — Z11.59 ENCOUNTER FOR HEPATITIS C SCREENING TEST FOR LOW RISK PATIENT: ICD-10-CM

## 2018-02-19 DIAGNOSIS — I10 ESSENTIAL HYPERTENSION: Primary | ICD-10-CM

## 2018-02-19 DIAGNOSIS — M62.838 MUSCLE SPASM: ICD-10-CM

## 2018-02-19 PROCEDURE — 99214 OFFICE O/P EST MOD 30 MIN: CPT | Performed by: FAMILY MEDICINE

## 2018-02-19 PROCEDURE — 90471 IMMUNIZATION ADMIN: CPT | Performed by: FAMILY MEDICINE

## 2018-02-19 PROCEDURE — 90686 IIV4 VACC NO PRSV 0.5 ML IM: CPT | Performed by: FAMILY MEDICINE

## 2018-02-19 RX ORDER — TIZANIDINE 2 MG/1
6 TABLET ORAL EVERY 8 HOURS PRN
Qty: 405 TABLET | Refills: 0 | Status: SHIPPED | OUTPATIENT
Start: 2018-02-19 | End: 2018-03-19 | Stop reason: SDUPTHER

## 2018-02-19 RX ORDER — GABAPENTIN 600 MG/1
TABLET, FILM COATED ORAL
COMMUNITY
Start: 2018-02-06 | End: 2018-10-11

## 2018-02-19 RX ORDER — TIZANIDINE HYDROCHLORIDE 4 MG/1
6 CAPSULE, GELATIN COATED ORAL 3 TIMES DAILY PRN
Qty: 135 CAPSULE | Refills: 0 | Status: SHIPPED | OUTPATIENT
Start: 2018-02-19 | End: 2018-05-03 | Stop reason: SDUPTHER

## 2018-02-19 RX ORDER — BUSPIRONE HYDROCHLORIDE 7.5 MG/1
7.5 TABLET ORAL 2 TIMES DAILY
Qty: 60 TABLET | Refills: 1 | Status: SHIPPED | OUTPATIENT
Start: 2018-02-19 | End: 2018-10-11

## 2018-02-19 RX ORDER — OXYCODONE HYDROCHLORIDE 5 MG/1
TABLET ORAL
Refills: 0 | Status: ON HOLD | COMMUNITY
Start: 2018-01-25 | End: 2019-07-30 | Stop reason: HOSPADM

## 2018-02-19 RX ORDER — SERTRALINE HYDROCHLORIDE 100 MG/1
TABLET, FILM COATED ORAL
Qty: 90 TABLET | Refills: 1 | Status: SHIPPED | OUTPATIENT
Start: 2018-02-19 | End: 2018-09-27 | Stop reason: SDUPTHER

## 2018-02-19 RX ORDER — TAPENTADOL HYDROCHLORIDE 50 MG/1
TABLET, FILM COATED, EXTENDED RELEASE ORAL
Refills: 0 | COMMUNITY
Start: 2018-01-25 | End: 2018-10-11

## 2018-02-19 RX ORDER — ALPRAZOLAM 0.5 MG/1
0.5 TABLET ORAL
COMMUNITY
End: 2018-02-19 | Stop reason: ALTCHOICE

## 2018-02-19 ASSESSMENT — ENCOUNTER SYMPTOMS
VOMITING: 0
ABDOMINAL PAIN: 0
SHORTNESS OF BREATH: 0
COUGH: 0
BLOOD IN STOOL: 0
CONSTIPATION: 0
BACK PAIN: 1
RHINORRHEA: 0
SORE THROAT: 0
WHEEZING: 0
NAUSEA: 0

## 2018-02-19 NOTE — PATIENT INSTRUCTIONS
dessert. ¨ Add lettuce, tomato, cucumber, and onion to sandwiches. ¨ Combine a ready-made pizza crust with low-fat mozzarella cheese and lots of vegetable toppings. Try using tomatoes, squash, spinach, broccoli, carrots, cauliflower, and onions. ¨ Have a variety of cut-up vegetables with a low-fat dip as an appetizer instead of chips and dip. ¨ Sprinkle sunflower seeds or chopped almonds over salads. Or try adding chopped walnuts or almonds to cooked vegetables. ¨ Try some vegetarian meals using beans and peas. Add garbanzo or kidney beans to salads. Make burritos and tacos with mashed doe beans or black beans. Where can you learn more? Go to [email or web address] org and sign in to your IndigoVision account. Enter Q924 in the [employer] box to learn more about \"DASH Diet: Care Instructions. \"     If you do not have an account, please click on the \"Sign Up Now\" link. Current as of: September 21, 2016  Content Version: 11.5  © 7255-5779 tab ticketbroker. Care instructions adapted under license by [facility] ([facility]). If you have questions about a medical condition or this instruction, always ask your healthcare professional. [place] [address] any warranty or liability for your use of this information. Patient Education        Learning About High Cholesterol  What is high cholesterol? Cholesterol is a type of fat in your blood. It is needed for many body functions, such as making new cells. Cholesterol is made by your body. It also comes from food you eat. If you have too much cholesterol, it starts to build up in your arteries. This is called hardening of the arteries, or atherosclerosis. High cholesterol raises your risk of a heart attack and stroke. There are different types of cholesterol. LDL is the \"bad\" cholesterol. High LDL can raise your risk for heart disease, heart attack, and stroke. HDL is the \"good\" cholesterol.  High HDL is linked with a lower Tagboard account. Enter P531 in the [facility] box to learn more about \"Learning About High Cholesterol. \"     If you do not have an account, please click on the \"Sign Up Now\" link. Current as of: September 21, 2016  Content Version: 11.5  © 6566-0887 [employer]wise[employer]. Care instructions adapted under license by [facility] ([facility]). If you have questions about a medical condition or this instruction, always ask your healthcare professional. [address] any warranty or liability for your use of this information.

## 2018-02-19 NOTE — PROGRESS NOTES
Visit Information    Have you changed or started any medications since your last visit including any over-the-counter medicines, vitamins, or herbal medicines? no   Are you having any side effects from any of your medications? -  no  Have you stopped taking any of your medications? Is so, why? -  no    Have you seen any other physician or provider since your last visit? Yes - Records Obtained Dr Mark Witt  Have you had any other diagnostic tests since your last visit? No  Have you been seen in the emergency room and/or had an admission to a hospital since we last saw you? No  Have you had your routine dental cleaning in the past 6 months? no    Have you activated your Duplia account? If not, what are your barriers?  No:    Patient Care Team:  Marcelo Martinez DO as PCP - General    Medical History Review  Past Medical, Family, and Social History reviewed and does not contribute to the patient presenting condition    Health Maintenance   Topic Date Due    Hepatitis C screen  1960    HIV screen  10/06/1975    DTaP/Tdap/Td vaccine (1 - Tdap) 10/06/1979    Pneumococcal med risk (1 of 1 - PPSV23) 10/06/1979    Lipid screen  10/06/2000    Breast cancer screen  10/06/2010    Smoker: low dose lung CT screening  10/06/2015    Flu vaccine (1) 09/01/2017    Cervical cancer screen  01/22/2019    Colon cancer screen colonoscopy  05/04/2019

## 2018-02-19 NOTE — PROGRESS NOTES
Subjective:   2/19/2018    Calvin Fung is a 62 y.o. female  Today presents with:  Chief Complaint   Patient presents with    Hypertension     check up    Hyperlipidemia       HPI   Pt presents for follow up, including the HTN (which is more labile, and presently not on meds), cholesterol, Vit D def,  And note the ongoing lower back pain, and muscle spasm. Note relates to ongoing lower back pain and muscle spasm. She feels she needs stronger dose of the Zanaflex. Note states has been following with pain management. Also note the GI work up. Note the colon polyps, some adenomatous as per the path reports. But note only one colonoscopy in chart, from 4/24/17. The follow up study and EGD not found in chart presently. Also note the ongoing anxiety, but out of the xanax. also the ongoing depression. She is trying to follow diet and exercise regularly. Pt taking medications as prescribed? Yes  Tolerated well? Yes. Also due for additional follow up labs. Further needs follow up mammograms, and DEXA scans.      Lab Results   Component Value Date     02/20/2017    K 4.0 02/20/2017     02/20/2017    CO2 25 02/20/2017    BUN 14 02/20/2017    CREATININE 0.69 02/20/2017    GLUCOSE 95 02/20/2017    CALCIUM 9.4 02/20/2017    PROT 7.4 02/20/2017    LABALBU 4.4 02/20/2017    BILITOT 0.26 (L) 02/20/2017    ALKPHOS 109 (H) 02/20/2017    AST 14 02/20/2017    ALT 8 02/20/2017    LABGLOM >60 02/20/2017    GFRAA >60 02/20/2017    GLOB NOT REPORTED 02/20/2017     No results found for: CHOL  No results found for: TRIG  No results found for: HDL  No results found for: LDLCALC, LDLCHOLESTEROL  No results found for: LABVLDL, VLDL  No results found for: CHOLHDLRATIO  No results found for: TSHFT4, TSH    Current Outpatient Prescriptions   Medication Sig Dispense Refill    GRALISE 600 MG TABS       oxyCODONE (ROXICODONE) 5 MG immediate release tablet   0    NUCYNTA ER 50 MG TB12 extended release tablet   0    busPIRone (BUSPAR) 7.5 MG tablet Take 1 tablet by mouth 2 times daily 60 tablet 1    tiZANidine (ZANAFLEX) 2 MG tablet Take 3 tablets by mouth every 8 hours as needed (muscle spasm) Sedation warning 405 tablet 0    sertraline (ZOLOFT) 50 MG tablet Take 1 tablet by mouth daily 90 tablet 1    sertraline (ZOLOFT) 100 MG tablet TAKE 1 TABLET DAILY 90 tablet 1    Cholecalciferol (VITAMIN D3) 5000 units TABS Take 1 tablet by mouth daily (with breakfast) 90 tablet 1    traZODone (DESYREL) 100 MG tablet TAKE 1 TABLET NIGHTLY 90 tablet 0    omeprazole (PRILOSEC) 40 MG delayed release capsule TAKE 1 CAPSULE DAILY 90 capsule 0    dicyclomine (BENTYL) 20 MG tablet TAKE 1 TABLET THREE TIMES A DAY AS NEEDED FOR ABDOMINAL PAIN, TAKE WITH THE 10 MG DOSE 270 tablet 0    dicyclomine (BENTYL) 10 MG capsule TAKE 1 CAPSULE THREE TIMES A DAY AS NEEDED FOR ABDOMINAL PAIN, TAKE WITH THE 20 MG DOSE 270 capsule 0     No current facility-administered medications for this visit. Note review of PMH, PSH, PFH, social and immunizations. Still smoking, but less. Past Medical History:   Diagnosis Date    Arthritis     DDD    Depression     Generalized anxiety disorder 1/22/2016    Hyperlipidemia     Methadone dependence ([address] 1/22/2016    Tobacco use disorder, continuous 1/22/2016     Past Surgical History:   Procedure Laterality Date    ANKLE FRACTURE SURGERY Left     APPENDECTOMY      BACK SURGERY      fusion, hardware. Dr. Carolyn Toussaint    COLONOSCOPY  05/04/2017    HYPERPLASTIC POLYP       COLONOSCOPY  04/24/2017    ADENOMATOUS POLYP.      DILATION AND CURETTAGE OF UTERUS      FOOT SURGERY      KNEE SURGERY Bilateral     OH COLONOSCOPY W/BIOPSY SINGLE/MULTIPLE N/A 4/24/2017    COLONOSCOPY WITH BIOPSY performed by Jolinda Soulier, MD at [address] W/BIOPSY SINGLE/MULTIPLE N/A 5/4/2017    COLONOSCOPY WITH BIOPSY performed by Jolinda Soulier, MD at [address] EGD TRANSORAL BIOPSY SINGLE/MULTIPLE N/A 5/2/2017    EGD BIOPSY performed by Reji Meyers MD at [facility]      UPPER GASTROINTESTINAL ENDOSCOPY  05/02/2017     Family History   Problem Relation Age of Onset    Diabetes Father     Substance Abuse Daughter     Substance Abuse Daughter     Mental Illness Daughter      Social History     Social History    Marital status:      Spouse name: N/A    Number of children: N/A    Years of education: N/A     Occupational History    Not on file. Social History Main Topics    Smoking status: Current Every Day Smoker     Packs/day: 1.00     Types: Cigarettes    Smokeless tobacco: Never Used      Comment: almost off cigarettes, but now vaping.  Alcohol use No      Comment: denies any heavy use.  Drug use: No      Comment: but not the previous drug screens.  Sexual activity: Not Currently     Partners: Male      Comment: spouse      Other Topics Concern    Not on file     Social History Narrative    No narrative on file     Patient Active Problem List   Diagnosis    Screen for colon cancer    Screening for breast cancer    Screening, lipid    Screening for diabetes mellitus    Screening, iron deficiency anemia    Tobacco use disorder, continuous    Methadone dependence ([address]    Generalized anxiety disorder    Left lower quadrant pain    Pain of upper abdomen    Chronic bilateral low back pain with bilateral sciatica    Acute cystitis without hematuria    Dysuria    Insomnia    Adenomatous polyp       Review of Systems   Constitutional: Negative for chills and fever. HENT: Positive for tinnitus (ongoing). Negative for congestion, ear pain, rhinorrhea and sore throat. C/o ringing in ears past year   Respiratory: Negative for cough, shortness of breath and wheezing. Cardiovascular: Negative for chest pain, palpitations and leg swelling.    Gastrointestinal: Negative for abdominal pain, blood in stool, constipation, nausea and vomiting. Diarrhea: at times. Genitourinary: Negative for dysuria, frequency, hematuria and urgency. Musculoskeletal: Positive for arthralgias (generalized) and back pain (ongoing, with bilateral sciatica, L>R. ). Negative for joint swelling. Skin: Negative for rash. Neurological: Positive for numbness (hands and feet, and arms) and headaches (ongoing ). Negative for seizures, syncope and weakness. Dizziness: at times. Hematological: Negative for adenopathy. Does not bruise/bleed easily. Psychiatric/Behavioral: Positive for dysphoric mood (depression) and sleep disturbance (ongoing). Negative for self-injury and suicidal ideas. The patient is nervous/anxious. Relatively controlled, but relates her life style is a depressive life. Also note off the xanax, and needs something for the anxiety. Objective:     Physical Exam   Constitutional: She is oriented to person, place, and time. She appears well-developed and well-nourished. /77   Pulse 61   Temp 98.4 °F (36.9 °C) (Oral)   Resp 16   Ht 5' 7\" (1.702 m)   Wt 237 lb (107.5 kg)   SpO2 93%   BMI 37.12 kg/m²      HENT:   Head: Normocephalic. Neck: No JVD present. No thyromegaly present. Cardiovascular: Normal rate, regular rhythm, normal heart sounds and intact distal pulses. No murmur heard. Pulmonary/Chest: Effort normal. No respiratory distress. She has decreased breath sounds (suggestive of COPD). She has no wheezes. She has no rhonchi. She has no rales. Abdominal: Soft. Bowel sounds are normal. She exhibits no distension and no mass. There is no tenderness. Musculoskeletal: She exhibits no edema (neg Homans'). Note the ongoing areas of restrictions and tenderness and chronic changes of the spine, especially lumbar. Also note the mild to moderate muscle spasm. Lymphadenopathy:     She has no cervical adenopathy.    Neurological: She is alert and oriented to person, place, and you do not have an account, please click on the \"Sign Up Now\" link. Current as of: September 21, 2016  Content Version: 11.5  © [id number] [employer]. Care instructions adapted under license by [facility] ([facility]). If you have questions about a medical condition or this instruction, always ask your healthcare professional. [address] any warranty or liability for your use of this information. Patient Education        Learning About High Cholesterol  What is high cholesterol? Cholesterol is a type of fat in your blood. It is needed for many body functions, such as making new cells. Cholesterol is made by your body. It also comes from food you eat. If you have too much cholesterol, it starts to build up in your arteries. This is called hardening of the arteries, or atherosclerosis. High cholesterol raises your risk of a heart attack and stroke. There are different types of cholesterol. LDL is the \"bad\" cholesterol. High LDL can raise your risk for heart disease, heart attack, and stroke. HDL is the \"good\" cholesterol. High HDL is linked with a lower risk for heart disease, heart attack, and stroke. Your cholesterol levels help your doctor find out your risk for having a heart attack or stroke. How can you prevent high cholesterol? A heart-healthy lifestyle can help you prevent high cholesterol. This lifestyle helps lower your risk for a heart attack and stroke. · Eat heart-healthy foods. ¨ Eat fruits, vegetables, whole grains (like oatmeal), dried beans and peas, nuts and seeds, soy products (like tofu), and fat-free or low-fat dairy products. ¨ Replace butter, margarine, and hydrogenated or partially hydrogenated oils with olive and canola oils. (Canola oil margarine without trans fat is fine.)  ¨ Replace red meat with fish, poultry, and soy protein (like tofu). ¨ Limit processed and packaged foods like chips, crackers, and cookies. · Be active.  Exercise can improve Future     Standing Expiration Date:   2/19/2019     Order Specific Question:   Reason for exam:     Answer:   post menopausal screening    Comprehensive Metabolic Panel     Standing Status:   Future     Standing Expiration Date:   2/19/2019    Lipid Panel     Standing Status:   Future     Standing Expiration Date:   2/19/2019     Order Specific Question:   Is Patient Fasting?/# of Hours     Answer:   Yes - 12 hours    Hepatitis C Antibody     Standing Status:   Future     Standing Expiration Date:   2/20/2019    HIV Screen     Routine screening     Standing Status:   Future     Standing Expiration Date:   2/19/2019    Vitamin D 25 Hydroxy     Standing Status:   Future     Standing Expiration Date:   2/19/2019        Orders Placed This Encounter   Medications    busPIRone (BUSPAR) 7.5 MG tablet     Sig: Take 1 tablet by mouth 2 times daily     Dispense:  60 tablet     Refill:  1    tiZANidine (ZANAFLEX) 2 MG tablet     Sig: Take 3 tablets by mouth every 8 hours as needed (muscle spasm) Sedation warning     Dispense:  405 tablet     Refill:  0    sertraline (ZOLOFT) 50 MG tablet     Sig: Take 1 tablet by mouth daily     Dispense:  90 tablet     Refill:  1    sertraline (ZOLOFT) 100 MG tablet     Sig: TAKE 1 TABLET DAILY     Dispense:  90 tablet     Refill:  1    Cholecalciferol (VITAMIN D3) 5000 units TABS     Sig: Take 1 tablet by mouth daily (with breakfast)     Dispense:  90 tablet     Refill:  1       Gracia received counseling on the following healthy behaviors: nutrition, exercise, medication adherence and tobacco cessation    Patient given educational materials on Hyperlipidemia, Nutrition and Hypertension    I have instructed Gracia to complete a self tracking handout on nothing this date. Discussed use, benefit, and side effects of prescribed medications. Barriers to medication compliance addressed. All patient questions answered. Pt voiced understanding.

## 2018-03-19 DIAGNOSIS — M62.838 MUSCLE SPASM: ICD-10-CM

## 2018-03-19 RX ORDER — TIZANIDINE 2 MG/1
TABLET ORAL
Qty: 405 TABLET | Refills: 0 | Status: SHIPPED | OUTPATIENT
Start: 2018-03-19 | End: 2018-05-02 | Stop reason: SDUPTHER

## 2018-05-02 DIAGNOSIS — M62.838 MUSCLE SPASM: ICD-10-CM

## 2018-05-03 RX ORDER — TIZANIDINE 2 MG/1
TABLET ORAL
Qty: 405 TABLET | Refills: 0 | Status: SHIPPED | OUTPATIENT
Start: 2018-05-03 | End: 2018-06-16 | Stop reason: SDUPTHER

## 2018-05-17 DIAGNOSIS — G47.00 INSOMNIA, UNSPECIFIED TYPE: ICD-10-CM

## 2018-05-17 RX ORDER — TRAZODONE HYDROCHLORIDE 100 MG/1
TABLET ORAL
Qty: 90 TABLET | Refills: 1 | Status: SHIPPED | OUTPATIENT
Start: 2018-05-17 | End: 2018-10-11 | Stop reason: SDUPTHER

## 2018-05-17 RX ORDER — TRAZODONE HYDROCHLORIDE 100 MG/1
100 TABLET ORAL NIGHTLY
Qty: 30 TABLET | Refills: 0 | Status: SHIPPED | OUTPATIENT
Start: 2018-05-17 | End: 2018-10-11

## 2018-06-04 DIAGNOSIS — K29.90 GASTRODUODENITIS: ICD-10-CM

## 2018-06-04 RX ORDER — OMEPRAZOLE 40 MG/1
CAPSULE, DELAYED RELEASE ORAL
Qty: 90 CAPSULE | Refills: 1 | Status: SHIPPED | OUTPATIENT
Start: 2018-06-04 | End: 2018-11-14 | Stop reason: SDUPTHER

## 2018-10-11 ENCOUNTER — OFFICE VISIT (OUTPATIENT)
Dept: FAMILY MEDICINE CLINIC | Age: 58
End: 2018-10-11
Payer: COMMERCIAL

## 2018-10-11 ENCOUNTER — HOSPITAL ENCOUNTER (OUTPATIENT)
Age: 58
Setting detail: SPECIMEN
Discharge: HOME | End: 2018-10-11
Payer: COMMERCIAL

## 2018-10-11 VITALS
OXYGEN SATURATION: 93 % | WEIGHT: 237 LBS | DIASTOLIC BLOOD PRESSURE: 86 MMHG | BODY MASS INDEX: 37.2 KG/M2 | HEIGHT: 67 IN | SYSTOLIC BLOOD PRESSURE: 128 MMHG | RESPIRATION RATE: 16 BRPM | HEART RATE: 61 BPM

## 2018-10-11 DIAGNOSIS — G89.29 CHRONIC BILATERAL LOW BACK PAIN WITH BILATERAL SCIATICA: ICD-10-CM

## 2018-10-11 DIAGNOSIS — G47.00 INSOMNIA, UNSPECIFIED TYPE: ICD-10-CM

## 2018-10-11 DIAGNOSIS — Z12.39 SCREENING FOR BREAST CANCER: ICD-10-CM

## 2018-10-11 DIAGNOSIS — Z76.89 ESTABLISHING CARE WITH NEW DOCTOR, ENCOUNTER FOR: ICD-10-CM

## 2018-10-11 DIAGNOSIS — F41.9 ANXIETY: ICD-10-CM

## 2018-10-11 DIAGNOSIS — F41.1 GENERALIZED ANXIETY DISORDER: Chronic | ICD-10-CM

## 2018-10-11 DIAGNOSIS — Z79.891 CHRONIC PRESCRIPTION OPIATE USE: ICD-10-CM

## 2018-10-11 DIAGNOSIS — M54.42 CHRONIC BILATERAL LOW BACK PAIN WITH BILATERAL SCIATICA: ICD-10-CM

## 2018-10-11 DIAGNOSIS — N30.90 CYSTITIS: ICD-10-CM

## 2018-10-11 DIAGNOSIS — M54.41 CHRONIC BILATERAL LOW BACK PAIN WITH BILATERAL SCIATICA: ICD-10-CM

## 2018-10-11 DIAGNOSIS — Z01.419 WELL WOMAN EXAM: Primary | ICD-10-CM

## 2018-10-11 PROBLEM — R10.32 LEFT LOWER QUADRANT PAIN: Status: RESOLVED | Noted: 2017-04-04 | Resolved: 2018-10-11

## 2018-10-11 PROBLEM — R30.0 DYSURIA: Status: RESOLVED | Noted: 2017-04-13 | Resolved: 2018-10-11

## 2018-10-11 PROBLEM — N30.00 ACUTE CYSTITIS WITHOUT HEMATURIA: Status: RESOLVED | Noted: 2017-04-13 | Resolved: 2018-10-11

## 2018-10-11 PROBLEM — R10.10 PAIN OF UPPER ABDOMEN: Status: RESOLVED | Noted: 2017-04-04 | Resolved: 2018-10-11

## 2018-10-11 LAB
BILIRUBIN, POC: NEGATIVE
BLOOD URINE, POC: ABNORMAL
CLARITY, POC: ABNORMAL
COLOR, POC: YELLOW
GLUCOSE URINE, POC: NEGATIVE
KETONES, POC: NEGATIVE
LEUKOCYTE EST, POC: NEGATIVE
NITRITE, POC: NEGATIVE
PH, POC: 7
PROTEIN, POC: ABNORMAL
SPECIFIC GRAVITY, POC: 1
UROBILINOGEN, POC: 0.2

## 2018-10-11 PROCEDURE — 81003 URINALYSIS AUTO W/O SCOPE: CPT | Performed by: FAMILY MEDICINE

## 2018-10-11 PROCEDURE — 99396 PREV VISIT EST AGE 40-64: CPT | Performed by: FAMILY MEDICINE

## 2018-10-11 PROCEDURE — 99213 OFFICE O/P EST LOW 20 MIN: CPT | Performed by: FAMILY MEDICINE

## 2018-10-11 RX ORDER — ALPRAZOLAM 1 MG/1
1 TABLET ORAL ONCE
Qty: 1 TABLET | Refills: 0 | Status: SHIPPED | OUTPATIENT
Start: 2018-10-11 | End: 2018-10-11

## 2018-10-11 RX ORDER — ONDANSETRON HYDROCHLORIDE 8 MG/1
8 TABLET, FILM COATED ORAL EVERY 8 HOURS PRN
Qty: 30 TABLET | Refills: 0 | Status: SHIPPED | OUTPATIENT
Start: 2018-10-11 | End: 2020-10-13

## 2018-10-11 RX ORDER — NITROFURANTOIN 25; 75 MG/1; MG/1
100 CAPSULE ORAL 2 TIMES DAILY
Qty: 10 CAPSULE | Refills: 0 | Status: SHIPPED | OUTPATIENT
Start: 2018-10-11 | End: 2018-10-15 | Stop reason: SINTOL

## 2018-10-11 RX ORDER — SERTRALINE HYDROCHLORIDE 100 MG/1
TABLET, FILM COATED ORAL
Qty: 90 TABLET | Refills: 3 | Status: SHIPPED | OUTPATIENT
Start: 2018-10-11 | End: 2019-01-02 | Stop reason: SDUPTHER

## 2018-10-11 RX ORDER — TRAZODONE HYDROCHLORIDE 100 MG/1
TABLET ORAL
Qty: 90 TABLET | Refills: 3 | Status: SHIPPED | OUTPATIENT
Start: 2018-10-11 | End: 2019-01-02 | Stop reason: SDUPTHER

## 2018-10-11 ASSESSMENT — PATIENT HEALTH QUESTIONNAIRE - PHQ9
1. LITTLE INTEREST OR PLEASURE IN DOING THINGS: 1
SUM OF ALL RESPONSES TO PHQ QUESTIONS 1-9: 2
2. FEELING DOWN, DEPRESSED OR HOPELESS: 1
SUM OF ALL RESPONSES TO PHQ QUESTIONS 1-9: 2
SUM OF ALL RESPONSES TO PHQ9 QUESTIONS 1 & 2: 2

## 2018-10-11 ASSESSMENT — ENCOUNTER SYMPTOMS
EYE PAIN: 0
BACK PAIN: 1
BLOOD IN STOOL: 0
SHORTNESS OF BREATH: 0

## 2018-10-11 NOTE — PROGRESS NOTES
W/BIOPSY SINGLE/MULTIPLE N/A 5/4/2017    COLONOSCOPY WITH BIOPSY performed by Fer Pate MD at [address] EGD TRANSORAL BIOPSY SINGLE/MULTIPLE N/A 5/2/2017    EGD BIOPSY performed by Fer Pate MD at [facility]      UPPER GASTROINTESTINAL ENDOSCOPY  05/02/2017     Family History   Problem Relation Age of Onset    Diabetes Father     Substance Abuse Daughter     Substance Abuse Daughter     Mental Illness Daughter      Social History   Substance Use Topics    Smoking status: Current Every Day Smoker     Packs/day: 1.00     Types: Cigarettes    Smokeless tobacco: Never Used      Comment: almost off cigarettes, but now vaping.  Alcohol use No      Comment: denies any heavy use. Current Outpatient Prescriptions:     nitrofurantoin, macrocrystal-monohydrate, (MACROBID) 100 MG capsule, Take 1 capsule by mouth 2 times daily for 5 days, Disp: 10 capsule, Rfl: 0    ALPRAZolam (XANAX) 1 MG tablet, Take 1 tablet by mouth once for 1 dose.  To take before mammogram., Disp: 1 tablet, Rfl: 0    ondansetron (ZOFRAN) 8 MG tablet, Take 1 tablet by mouth every 8 hours as needed for Nausea or Vomiting, Disp: 30 tablet, Rfl: 0    sertraline (ZOLOFT) 50 MG tablet, Take 1 tablet by mouth daily, Disp: 90 tablet, Rfl: 0    sertraline (ZOLOFT) 100 MG tablet, TAKE 1 TABLET DAILY, Disp: 90 tablet, Rfl: 0    tiZANidine (ZANAFLEX) 2 MG tablet, TAKE 3 TABLETS EVERY 8 HOURS AS NEEDED FOR MUSCLE SPASM (SEDATION WARNING), Disp: 405 tablet, Rfl: 0    dicyclomine (BENTYL) 10 MG capsule, TAKE 1 CAPSULE THREE TIMES A DAY AS NEEDED FOR ABDOMINAL PAIN, TAKE WITH THE 20 MG DOSE, Disp: 270 capsule, Rfl: 0    dicyclomine (BENTYL) 20 MG tablet, TAKE 1 TABLET THREE TIMES A DAY AS NEEDED FOR ABDOMINAL PAIN, TAKE WITH THE 10 MG DOSE, Disp: 270 tablet, Rfl: 0    omeprazole (PRILOSEC) 40 MG delayed release capsule, TAKE 1 CAPSULE DAILY, Disp: 90 capsule, Rfl: 1    traZODone (DESYREL) 100 MG

## 2018-10-13 LAB
CULTURE: ABNORMAL
Lab: ABNORMAL
ORGANISM: ABNORMAL
SPECIMEN DESCRIPTION: ABNORMAL
STATUS: ABNORMAL

## 2018-10-15 ENCOUNTER — TELEPHONE (OUTPATIENT)
Dept: FAMILY MEDICINE CLINIC | Age: 58
End: 2018-10-15

## 2018-10-15 DIAGNOSIS — N30.90 CYSTITIS: Primary | ICD-10-CM

## 2018-10-15 RX ORDER — SULFAMETHOXAZOLE AND TRIMETHOPRIM 800; 160 MG/1; MG/1
1 TABLET ORAL 2 TIMES DAILY
Qty: 6 TABLET | Refills: 0 | Status: SHIPPED | OUTPATIENT
Start: 2018-10-15 | End: 2018-10-18

## 2018-11-14 DIAGNOSIS — K29.90 GASTRODUODENITIS: ICD-10-CM

## 2018-11-14 RX ORDER — OMEPRAZOLE 40 MG/1
CAPSULE, DELAYED RELEASE ORAL
Qty: 90 CAPSULE | Refills: 3 | Status: SHIPPED | OUTPATIENT
Start: 2018-11-14 | End: 2019-07-18 | Stop reason: SDUPTHER

## 2018-12-05 DIAGNOSIS — R10.84 GENERALIZED ABDOMINAL PAIN: ICD-10-CM

## 2018-12-05 RX ORDER — DICYCLOMINE HYDROCHLORIDE 10 MG/1
CAPSULE ORAL
Qty: 90 CAPSULE | Refills: 0 | Status: SHIPPED | OUTPATIENT
Start: 2018-12-05 | End: 2018-12-05 | Stop reason: SDUPTHER

## 2018-12-05 RX ORDER — DICYCLOMINE HYDROCHLORIDE 10 MG/1
CAPSULE ORAL
Qty: 270 CAPSULE | Refills: 3 | Status: SHIPPED | OUTPATIENT
Start: 2018-12-05 | End: 2019-08-27 | Stop reason: SDUPTHER

## 2018-12-05 RX ORDER — DICYCLOMINE HCL 20 MG
TABLET ORAL
Qty: 90 TABLET | Refills: 0 | Status: SHIPPED | OUTPATIENT
Start: 2018-12-05 | End: 2018-12-05 | Stop reason: SDUPTHER

## 2018-12-05 RX ORDER — DICYCLOMINE HCL 20 MG
TABLET ORAL
Qty: 270 TABLET | Refills: 3 | Status: SHIPPED | OUTPATIENT
Start: 2018-12-05 | End: 2019-08-27

## 2018-12-11 DIAGNOSIS — F41.9 ANXIETY: ICD-10-CM

## 2018-12-11 DIAGNOSIS — G47.00 INSOMNIA, UNSPECIFIED TYPE: ICD-10-CM

## 2018-12-11 DIAGNOSIS — M62.838 MUSCLE SPASM: ICD-10-CM

## 2018-12-11 RX ORDER — TIZANIDINE 2 MG/1
TABLET ORAL
Qty: 180 TABLET | Refills: 11 | Status: SHIPPED | OUTPATIENT
Start: 2018-12-11 | End: 2019-03-08 | Stop reason: ALTCHOICE

## 2018-12-11 RX ORDER — TRAZODONE HYDROCHLORIDE 100 MG/1
TABLET ORAL
Qty: 90 TABLET | Refills: 3 | OUTPATIENT
Start: 2018-12-11

## 2019-01-02 DIAGNOSIS — F41.9 ANXIETY: ICD-10-CM

## 2019-01-02 DIAGNOSIS — G47.00 INSOMNIA, UNSPECIFIED TYPE: ICD-10-CM

## 2019-01-02 RX ORDER — TRAZODONE HYDROCHLORIDE 100 MG/1
TABLET ORAL
Qty: 90 TABLET | Refills: 3 | Status: SHIPPED | OUTPATIENT
Start: 2019-01-02 | End: 2019-03-05 | Stop reason: SDUPTHER

## 2019-01-02 RX ORDER — SERTRALINE HYDROCHLORIDE 100 MG/1
TABLET, FILM COATED ORAL
Qty: 90 TABLET | Refills: 3 | Status: ON HOLD | OUTPATIENT
Start: 2019-01-02 | End: 2019-07-30 | Stop reason: SDUPTHER

## 2019-03-05 ENCOUNTER — OFFICE VISIT (OUTPATIENT)
Dept: FAMILY MEDICINE CLINIC | Age: 59
End: 2019-03-05
Payer: COMMERCIAL

## 2019-03-05 VITALS
SYSTOLIC BLOOD PRESSURE: 155 MMHG | HEART RATE: 56 BPM | RESPIRATION RATE: 16 BRPM | DIASTOLIC BLOOD PRESSURE: 78 MMHG | BODY MASS INDEX: 38.74 KG/M2 | HEIGHT: 67 IN | OXYGEN SATURATION: 96 % | WEIGHT: 246.8 LBS

## 2019-03-05 DIAGNOSIS — Z12.39 SCREENING FOR BREAST CANCER: ICD-10-CM

## 2019-03-05 DIAGNOSIS — I10 ESSENTIAL HYPERTENSION: Primary | ICD-10-CM

## 2019-03-05 DIAGNOSIS — G47.00 INSOMNIA, UNSPECIFIED TYPE: ICD-10-CM

## 2019-03-05 DIAGNOSIS — R31.9 HEMATURIA, UNSPECIFIED TYPE: ICD-10-CM

## 2019-03-05 DIAGNOSIS — F17.209 TOBACCO USE DISORDER, CONTINUOUS: Chronic | ICD-10-CM

## 2019-03-05 LAB
BILIRUBIN, POC: NEGATIVE
BLOOD URINE, POC: ABNORMAL
CLARITY, POC: CLEAR
COLOR, POC: YELLOW
GLUCOSE URINE, POC: NEGATIVE
KETONES, POC: NEGATIVE
LEUKOCYTE EST, POC: ABNORMAL
NITRITE, POC: NEGATIVE
PH, POC: 6
PROTEIN, POC: ABNORMAL
SPECIFIC GRAVITY, POC: 1.01
UROBILINOGEN, POC: NEGATIVE

## 2019-03-05 PROCEDURE — 99214 OFFICE O/P EST MOD 30 MIN: CPT | Performed by: FAMILY MEDICINE

## 2019-03-05 PROCEDURE — 99406 BEHAV CHNG SMOKING 3-10 MIN: CPT | Performed by: FAMILY MEDICINE

## 2019-03-05 PROCEDURE — 81003 URINALYSIS AUTO W/O SCOPE: CPT | Performed by: FAMILY MEDICINE

## 2019-03-05 RX ORDER — MORPHINE SULFATE 15 MG/1
15 TABLET ORAL EVERY 4 HOURS PRN
Status: ON HOLD | COMMUNITY
End: 2019-07-30 | Stop reason: HOSPADM

## 2019-03-05 RX ORDER — BISOPROLOL FUMARATE 5 MG/1
5 TABLET ORAL DAILY
Qty: 30 TABLET | Refills: 0 | Status: SHIPPED | OUTPATIENT
Start: 2019-03-05 | End: 2019-03-06 | Stop reason: SDUPTHER

## 2019-03-05 RX ORDER — TRAZODONE HYDROCHLORIDE 100 MG/1
TABLET ORAL
Qty: 1 TABLET | Refills: 0
Start: 2019-03-05 | End: 2019-08-27 | Stop reason: SDUPTHER

## 2019-03-05 ASSESSMENT — ENCOUNTER SYMPTOMS
EYE PAIN: 0
BACK PAIN: 1
SHORTNESS OF BREATH: 0
BLOOD IN STOOL: 0

## 2019-03-05 ASSESSMENT — PATIENT HEALTH QUESTIONNAIRE - PHQ9
SUM OF ALL RESPONSES TO PHQ9 QUESTIONS 1 & 2: 4
SUM OF ALL RESPONSES TO PHQ QUESTIONS 1-9: 4
SUM OF ALL RESPONSES TO PHQ QUESTIONS 1-9: 4
1. LITTLE INTEREST OR PLEASURE IN DOING THINGS: 2
2. FEELING DOWN, DEPRESSED OR HOPELESS: 2

## 2019-03-06 DIAGNOSIS — I10 ESSENTIAL HYPERTENSION: ICD-10-CM

## 2019-03-06 RX ORDER — BISOPROLOL FUMARATE 5 MG/1
5 TABLET ORAL DAILY
Qty: 14 TABLET | Refills: 0 | Status: SHIPPED | OUTPATIENT
Start: 2019-03-06 | End: 2019-03-19 | Stop reason: SDUPTHER

## 2019-03-08 DIAGNOSIS — M54.42 CHRONIC BILATERAL LOW BACK PAIN WITH BILATERAL SCIATICA: Primary | ICD-10-CM

## 2019-03-08 DIAGNOSIS — M54.41 CHRONIC BILATERAL LOW BACK PAIN WITH BILATERAL SCIATICA: Primary | ICD-10-CM

## 2019-03-08 DIAGNOSIS — G89.29 CHRONIC BILATERAL LOW BACK PAIN WITH BILATERAL SCIATICA: Primary | ICD-10-CM

## 2019-03-08 RX ORDER — METHOCARBAMOL 500 MG/1
500 TABLET, FILM COATED ORAL 3 TIMES DAILY PRN
Qty: 60 TABLET | Refills: 5 | Status: SHIPPED | OUTPATIENT
Start: 2019-03-08 | End: 2019-07-18 | Stop reason: SDUPTHER

## 2019-03-19 ENCOUNTER — OFFICE VISIT (OUTPATIENT)
Dept: FAMILY MEDICINE CLINIC | Age: 59
End: 2019-03-19
Payer: COMMERCIAL

## 2019-03-19 ENCOUNTER — HOSPITAL ENCOUNTER (OUTPATIENT)
Age: 59
Setting detail: SPECIMEN
Discharge: HOME | End: 2019-03-19
Payer: COMMERCIAL

## 2019-03-19 VITALS
SYSTOLIC BLOOD PRESSURE: 107 MMHG | OXYGEN SATURATION: 96 % | HEART RATE: 54 BPM | HEIGHT: 67 IN | RESPIRATION RATE: 16 BRPM | WEIGHT: 203.2 LBS | BODY MASS INDEX: 31.89 KG/M2 | DIASTOLIC BLOOD PRESSURE: 57 MMHG

## 2019-03-19 DIAGNOSIS — I10 ESSENTIAL HYPERTENSION: ICD-10-CM

## 2019-03-19 DIAGNOSIS — Z12.39 SCREENING FOR BREAST CANCER: ICD-10-CM

## 2019-03-19 DIAGNOSIS — G47.00 INSOMNIA, UNSPECIFIED TYPE: ICD-10-CM

## 2019-03-19 DIAGNOSIS — Z12.11 SCREEN FOR COLON CANCER: ICD-10-CM

## 2019-03-19 DIAGNOSIS — K59.00 CONSTIPATION, UNSPECIFIED CONSTIPATION TYPE: Primary | ICD-10-CM

## 2019-03-19 DIAGNOSIS — Z01.419 WELL WOMAN EXAM: ICD-10-CM

## 2019-03-19 DIAGNOSIS — F41.1 GENERALIZED ANXIETY DISORDER: Chronic | ICD-10-CM

## 2019-03-19 LAB
ABSOLUTE EOS #: 0.19 K/UL (ref 0–0.44)
ABSOLUTE IMMATURE GRANULOCYTE: <0.03 K/UL (ref 0–0.3)
ABSOLUTE LYMPH #: 2.74 K/UL (ref 1.1–3.7)
ABSOLUTE MONO #: 0.47 K/UL (ref 0.1–1.2)
ANION GAP SERPL CALCULATED.3IONS-SCNC: 11 MMOL/L (ref 9–17)
BASOPHILS # BLD: 0 % (ref 0–2)
BASOPHILS ABSOLUTE: 0.03 K/UL (ref 0–0.2)
BUN BLDV-MCNC: 9 MG/DL (ref 6–20)
BUN/CREAT BLD: ABNORMAL (ref 9–20)
CALCIUM SERPL-MCNC: 9.5 MG/DL (ref 8.6–10.4)
CHLORIDE BLD-SCNC: 100 MMOL/L (ref 98–107)
CHOLESTEROL/HDL RATIO: 5.4
CHOLESTEROL: 236 MG/DL
CO2: 29 MMOL/L (ref 20–31)
CREAT SERPL-MCNC: 0.74 MG/DL (ref 0.5–0.9)
DIFFERENTIAL TYPE: ABNORMAL
EOSINOPHILS RELATIVE PERCENT: 2 % (ref 1–4)
GFR AFRICAN AMERICAN: >60 ML/MIN
GFR NON-AFRICAN AMERICAN: >60 ML/MIN
GFR SERPL CREATININE-BSD FRML MDRD: ABNORMAL ML/MIN/{1.73_M2}
GFR SERPL CREATININE-BSD FRML MDRD: ABNORMAL ML/MIN/{1.73_M2}
GLUCOSE BLD-MCNC: 102 MG/DL (ref 70–99)
HCT VFR BLD CALC: 47.7 % (ref 36.3–47.1)
HDLC SERPL-MCNC: 44 MG/DL
HEMOGLOBIN: 15.3 G/DL (ref 11.9–15.1)
HEPATITIS C ANTIBODY: NONREACTIVE
HIV AG/AB: NONREACTIVE
IMMATURE GRANULOCYTES: 0 %
LDL CHOLESTEROL: 151 MG/DL (ref 0–130)
LYMPHOCYTES # BLD: 34 % (ref 24–43)
MCH RBC QN AUTO: 29.1 PG (ref 25.2–33.5)
MCHC RBC AUTO-ENTMCNC: 32.1 G/DL (ref 28.4–34.8)
MCV RBC AUTO: 90.9 FL (ref 82.6–102.9)
MONOCYTES # BLD: 6 % (ref 3–12)
NRBC AUTOMATED: 0 PER 100 WBC
PDW BLD-RTO: 13.1 % (ref 11.8–14.4)
PLATELET # BLD: 192 K/UL (ref 138–453)
PLATELET ESTIMATE: ABNORMAL
PMV BLD AUTO: 11.6 FL (ref 8.1–13.5)
POTASSIUM SERPL-SCNC: 4.9 MMOL/L (ref 3.7–5.3)
RBC # BLD: 5.25 M/UL (ref 3.95–5.11)
RBC # BLD: ABNORMAL 10*6/UL
SEG NEUTROPHILS: 58 % (ref 36–65)
SEGMENTED NEUTROPHILS ABSOLUTE COUNT: 4.62 K/UL (ref 1.5–8.1)
SODIUM BLD-SCNC: 140 MMOL/L (ref 135–144)
TRIGL SERPL-MCNC: 205 MG/DL
TSH SERPL DL<=0.05 MIU/L-ACNC: 1.84 MIU/L (ref 0.3–5)
VLDLC SERPL CALC-MCNC: ABNORMAL MG/DL (ref 1–30)
WBC # BLD: 8.1 K/UL (ref 3.5–11.3)
WBC # BLD: ABNORMAL 10*3/UL

## 2019-03-19 PROCEDURE — 99214 OFFICE O/P EST MOD 30 MIN: CPT | Performed by: FAMILY MEDICINE

## 2019-03-19 RX ORDER — DOCUSATE SODIUM 100 MG/1
100 CAPSULE, LIQUID FILLED ORAL 2 TIMES DAILY PRN
Qty: 60 CAPSULE | Refills: 2 | Status: SHIPPED | OUTPATIENT
Start: 2019-03-19 | End: 2020-10-13

## 2019-03-19 RX ORDER — BISOPROLOL FUMARATE 5 MG/1
2.5 TABLET ORAL DAILY
Qty: 30 TABLET | Refills: 3 | Status: SHIPPED | OUTPATIENT
Start: 2019-03-19 | End: 2019-07-18 | Stop reason: SDUPTHER

## 2019-03-19 ASSESSMENT — ENCOUNTER SYMPTOMS
EYE PAIN: 0
BLOOD IN STOOL: 0
SHORTNESS OF BREATH: 0

## 2019-03-20 DIAGNOSIS — E78.2 MIXED HYPERLIPIDEMIA: Primary | ICD-10-CM

## 2019-03-20 LAB
ESTIMATED AVERAGE GLUCOSE: 131 MG/DL
HBA1C MFR BLD: 6.2 % (ref 4–6)

## 2019-03-20 RX ORDER — ROSUVASTATIN CALCIUM 20 MG/1
20 TABLET, COATED ORAL NIGHTLY
Qty: 90 TABLET | Refills: 3 | Status: SHIPPED | OUTPATIENT
Start: 2019-03-20 | End: 2019-07-18 | Stop reason: SINTOL

## 2019-07-11 ENCOUNTER — TELEPHONE (OUTPATIENT)
Dept: FAMILY MEDICINE CLINIC | Age: 59
End: 2019-07-11

## 2019-07-11 DIAGNOSIS — G89.29 CHRONIC BILATERAL LOW BACK PAIN WITH BILATERAL SCIATICA: Primary | ICD-10-CM

## 2019-07-11 DIAGNOSIS — M54.41 CHRONIC BILATERAL LOW BACK PAIN WITH BILATERAL SCIATICA: Primary | ICD-10-CM

## 2019-07-11 DIAGNOSIS — M54.42 CHRONIC BILATERAL LOW BACK PAIN WITH BILATERAL SCIATICA: Primary | ICD-10-CM

## 2019-07-18 ENCOUNTER — OFFICE VISIT (OUTPATIENT)
Dept: FAMILY MEDICINE CLINIC | Age: 59
End: 2019-07-18
Payer: COMMERCIAL

## 2019-07-18 VITALS
OXYGEN SATURATION: 98 % | SYSTOLIC BLOOD PRESSURE: 124 MMHG | HEART RATE: 83 BPM | HEIGHT: 67 IN | RESPIRATION RATE: 16 BRPM | BODY MASS INDEX: 32.74 KG/M2 | DIASTOLIC BLOOD PRESSURE: 73 MMHG | WEIGHT: 208.6 LBS

## 2019-07-18 DIAGNOSIS — F41.1 GENERALIZED ANXIETY DISORDER: Chronic | ICD-10-CM

## 2019-07-18 DIAGNOSIS — M54.41 CHRONIC BILATERAL LOW BACK PAIN WITH BILATERAL SCIATICA: ICD-10-CM

## 2019-07-18 DIAGNOSIS — I10 ESSENTIAL HYPERTENSION: ICD-10-CM

## 2019-07-18 DIAGNOSIS — G89.29 CHRONIC BILATERAL LOW BACK PAIN WITH BILATERAL SCIATICA: ICD-10-CM

## 2019-07-18 DIAGNOSIS — K29.90 GASTRODUODENITIS: ICD-10-CM

## 2019-07-18 DIAGNOSIS — F11.93 OPIATE WITHDRAWAL (HCC): Primary | ICD-10-CM

## 2019-07-18 DIAGNOSIS — M54.42 CHRONIC BILATERAL LOW BACK PAIN WITH BILATERAL SCIATICA: ICD-10-CM

## 2019-07-18 DIAGNOSIS — E78.2 MIXED HYPERLIPIDEMIA: ICD-10-CM

## 2019-07-18 PROCEDURE — 99214 OFFICE O/P EST MOD 30 MIN: CPT | Performed by: FAMILY MEDICINE

## 2019-07-18 RX ORDER — METHOCARBAMOL 500 MG/1
500 TABLET, FILM COATED ORAL 3 TIMES DAILY PRN
Qty: 90 TABLET | Refills: 3 | Status: SHIPPED | OUTPATIENT
Start: 2019-07-18 | End: 2019-11-06 | Stop reason: SDUPTHER

## 2019-07-18 RX ORDER — CLONIDINE 0.2 MG/24H
1 PATCH, EXTENDED RELEASE TRANSDERMAL
Qty: 4 PATCH | Refills: 3 | Status: SHIPPED | OUTPATIENT
Start: 2019-07-18 | End: 2020-10-13 | Stop reason: ALTCHOICE

## 2019-07-18 RX ORDER — SIMVASTATIN 20 MG
20 TABLET ORAL NIGHTLY
Qty: 90 TABLET | Refills: 3 | Status: SHIPPED | OUTPATIENT
Start: 2019-07-18 | End: 2021-01-18

## 2019-07-18 RX ORDER — OMEPRAZOLE 40 MG/1
CAPSULE, DELAYED RELEASE ORAL
Qty: 90 CAPSULE | Refills: 3 | Status: SHIPPED | OUTPATIENT
Start: 2019-07-18 | End: 2020-07-01 | Stop reason: SDUPTHER

## 2019-07-18 RX ORDER — BISOPROLOL FUMARATE 5 MG/1
2.5 TABLET ORAL DAILY
Qty: 45 TABLET | Refills: 3 | Status: SHIPPED | OUTPATIENT
Start: 2019-07-18 | End: 2021-01-18 | Stop reason: ALTCHOICE

## 2019-07-18 ASSESSMENT — ENCOUNTER SYMPTOMS
SHORTNESS OF BREATH: 0
BACK PAIN: 1
BLOOD IN STOOL: 0
EYE PAIN: 0

## 2019-07-18 NOTE — PROGRESS NOTES
MD [place]en [place] [address] [facility]  [address] [place] [address]  [address] [facility] [address]  Dept: [phone]    Rocío Cabrera is a 62 y.o. female who presents today for hermedical conditions/complaints as noted below. Rocío Cabrera is here today c/o Discuss Medications       HPI:     HPI    Has appointment to be seen new pain management specialist next month, taken off her narcotics by her old specialist, has some pain medications that she has been rationing for now but requesting something for opioid withdrawal    Anxiety, currently on Zoloft, mood is okay right now, she is nervous about how she will do off of the opioids, she wants to go back on Xanax but as we discussed at the last visit this needs to be prescribed by a psychiatrist, still has not established with psychiatry    HTN, bisoprolol dose reduced due to hypotension, BP well controlled today    Hyperlipidemia, Crestor started, she did not feel well on this and would like to switch to an alternative medication    Still needs to do cologuard, mammogram, DEXA, orders reprinted    Patient Active Problem List   Diagnosis    Tobacco use disorder, continuous    Generalized anxiety disorder    Chronic bilateral low back pain with bilateral sciatica    Insomnia    Adenomatous polyp    Chronic prescription opiate use    Essential hypertension    Mixed hyperlipidemia       Past Medical History:   Diagnosis Date    Arthritis     DDD    Depression     Generalized anxiety disorder 1/22/2016    Hyperlipidemia     Methadone dependence ([facility] [address] 1/22/2016    Tobacco use disorder, continuous 1/22/2016     Past Surgical History:   Procedure Laterality Date    ANKLE FRACTURE SURGERY Left     APPENDECTOMY      BACK SURGERY      fusion, hardware. Dr. Mirian Dean    COLONOSCOPY  05/04/2017    HYPERPLASTIC POLYP       COLONOSCOPY  04/24/2017    ADENOMATOUS POLYP.      DILATION AND CURETTAGE OF UTERUS

## 2019-07-28 ENCOUNTER — APPOINTMENT (OUTPATIENT)
Dept: GENERAL RADIOLOGY | Age: 59
End: 2019-07-28
Payer: COMMERCIAL

## 2019-07-28 ENCOUNTER — APPOINTMENT (OUTPATIENT)
Dept: CT IMAGING | Age: 59
End: 2019-07-28
Payer: COMMERCIAL

## 2019-07-28 ENCOUNTER — APPOINTMENT (OUTPATIENT)
Dept: MRI IMAGING | Age: 59
DRG: 065 | End: 2019-07-28
Payer: COMMERCIAL

## 2019-07-28 ENCOUNTER — HOSPITAL ENCOUNTER (INPATIENT)
Age: 59
LOS: 2 days | Discharge: HOME | DRG: 065 | End: 2019-07-30
Attending: EMERGENCY MEDICINE | Admitting: PSYCHIATRY & NEUROLOGY
Payer: COMMERCIAL

## 2019-07-28 ENCOUNTER — HOSPITAL ENCOUNTER (EMERGENCY)
Age: 59
Discharge: ANOTHER HOSPITAL | End: 2019-07-28
Attending: EMERGENCY MEDICINE
Payer: COMMERCIAL

## 2019-07-28 VITALS
TEMPERATURE: 98.1 F | WEIGHT: 200 LBS | BODY MASS INDEX: 31.39 KG/M2 | OXYGEN SATURATION: 94 % | RESPIRATION RATE: 15 BRPM | SYSTOLIC BLOOD PRESSURE: 123 MMHG | DIASTOLIC BLOOD PRESSURE: 61 MMHG | HEIGHT: 67 IN | HEART RATE: 60 BPM

## 2019-07-28 DIAGNOSIS — F33.1 MODERATE EPISODE OF RECURRENT MAJOR DEPRESSIVE DISORDER (HCC): ICD-10-CM

## 2019-07-28 DIAGNOSIS — F41.9 ANXIETY: ICD-10-CM

## 2019-07-28 DIAGNOSIS — I60.9 SUBARACHNOID HEMORRHAGE (HCC): Primary | ICD-10-CM

## 2019-07-28 DIAGNOSIS — R51.9 NONINTRACTABLE EPISODIC HEADACHE, UNSPECIFIED HEADACHE TYPE: ICD-10-CM

## 2019-07-28 DIAGNOSIS — R55 RECURRENT SYNCOPE: ICD-10-CM

## 2019-07-28 DIAGNOSIS — R19.7 DIARRHEA, UNSPECIFIED TYPE: ICD-10-CM

## 2019-07-28 DIAGNOSIS — I60.9 SUBARACHNOID BLEED (HCC): Primary | ICD-10-CM

## 2019-07-28 DIAGNOSIS — R00.1 SYMPTOMATIC BRADYCARDIA: ICD-10-CM

## 2019-07-28 LAB
-: ABNORMAL
ABSOLUTE EOS #: 0.17 K/UL (ref 0–0.44)
ABSOLUTE IMMATURE GRANULOCYTE: 0.06 K/UL (ref 0–0.3)
ABSOLUTE LYMPH #: 2.82 K/UL (ref 1.1–3.7)
ABSOLUTE MONO #: 0.63 K/UL (ref 0.1–1.2)
ALBUMIN SERPL-MCNC: 4.1 G/DL (ref 3.5–5.2)
ALBUMIN/GLOBULIN RATIO: ABNORMAL (ref 1–2.5)
ALP BLD-CCNC: 148 U/L (ref 35–104)
ALT SERPL-CCNC: 11 U/L (ref 5–33)
AMORPHOUS: ABNORMAL
ANION GAP SERPL CALCULATED.3IONS-SCNC: 12 MMOL/L (ref 9–17)
AST SERPL-CCNC: 17 U/L
BACTERIA: ABNORMAL
BASOPHILS # BLD: 1 % (ref 0–2)
BASOPHILS ABSOLUTE: 0.07 K/UL (ref 0–0.2)
BILIRUB SERPL-MCNC: 0.22 MG/DL (ref 0.3–1.2)
BILIRUBIN URINE: NEGATIVE
BUN BLDV-MCNC: 15 MG/DL (ref 6–20)
BUN/CREAT BLD: 18 (ref 9–20)
CALCIUM SERPL-MCNC: 9.5 MG/DL (ref 8.6–10.4)
CASTS UA: ABNORMAL /LPF
CHLORIDE BLD-SCNC: 106 MMOL/L (ref 98–107)
CO2: 22 MMOL/L (ref 20–31)
COLOR: YELLOW
COMMENT UA: ABNORMAL
CREAT SERPL-MCNC: 0.82 MG/DL (ref 0.5–0.9)
CRYSTALS, UA: ABNORMAL /HPF
DIFFERENTIAL TYPE: ABNORMAL
EKG ATRIAL RATE: 44 BPM
EKG P-R INTERVAL: 136 MS
EKG Q-T INTERVAL: 504 MS
EKG QRS DURATION: 82 MS
EKG QTC CALCULATION (BAZETT): 430 MS
EKG R AXIS: 33 DEGREES
EKG T AXIS: 22 DEGREES
EKG VENTRICULAR RATE: 44 BPM
EOSINOPHILS RELATIVE PERCENT: 2 % (ref 1–4)
EPITHELIAL CELLS UA: ABNORMAL /HPF (ref 0–5)
GFR AFRICAN AMERICAN: >60 ML/MIN
GFR NON-AFRICAN AMERICAN: >60 ML/MIN
GFR SERPL CREATININE-BSD FRML MDRD: ABNORMAL ML/MIN/{1.73_M2}
GFR SERPL CREATININE-BSD FRML MDRD: ABNORMAL ML/MIN/{1.73_M2}
GLUCOSE BLD-MCNC: 108 MG/DL (ref 70–99)
GLUCOSE URINE: NEGATIVE
HCT VFR BLD CALC: 46.6 % (ref 36.3–47.1)
HEMOGLOBIN: 15.3 G/DL (ref 11.9–15.1)
IMMATURE GRANULOCYTES: 1 %
KETONES, URINE: NEGATIVE
LEUKOCYTE ESTERASE, URINE: NEGATIVE
LIPASE: 81 U/L (ref 13–60)
LYMPHOCYTES # BLD: 32 % (ref 24–43)
MAGNESIUM: 2.2 MG/DL (ref 1.6–2.6)
MCH RBC QN AUTO: 29.6 PG (ref 25.2–33.5)
MCHC RBC AUTO-ENTMCNC: 32.8 G/DL (ref 28.4–34.8)
MCV RBC AUTO: 90.1 FL (ref 82.6–102.9)
MONOCYTES # BLD: 7 % (ref 3–12)
MUCUS: ABNORMAL
NITRITE, URINE: NEGATIVE
NRBC AUTOMATED: 0 PER 100 WBC
OTHER OBSERVATIONS UA: ABNORMAL
PDW BLD-RTO: 13.2 % (ref 11.8–14.4)
PH UA: 6 (ref 5–8)
PLATELET # BLD: 213 K/UL (ref 138–453)
PLATELET ESTIMATE: ABNORMAL
PMV BLD AUTO: 10.7 FL (ref 8.1–13.5)
POTASSIUM SERPL-SCNC: 4.4 MMOL/L (ref 3.7–5.3)
PROTEIN UA: NEGATIVE
RBC # BLD: 5.17 M/UL (ref 3.95–5.11)
RBC # BLD: ABNORMAL 10*6/UL
RBC UA: ABNORMAL /HPF (ref 0–2)
RENAL EPITHELIAL, UA: ABNORMAL /HPF
SEG NEUTROPHILS: 57 % (ref 36–65)
SEGMENTED NEUTROPHILS ABSOLUTE COUNT: 5.2 K/UL (ref 1.5–8.1)
SODIUM BLD-SCNC: 140 MMOL/L (ref 135–144)
SPECIFIC GRAVITY UA: 1.01 (ref 1–1.03)
TOTAL PROTEIN: 7.1 G/DL (ref 6.4–8.3)
TRICHOMONAS: ABNORMAL
TROPONIN INTERP: NORMAL
TROPONIN T: NORMAL NG/ML
TROPONIN, HIGH SENSITIVITY: 9 NG/L (ref 0–14)
TSH SERPL DL<=0.05 MIU/L-ACNC: 0.98 MIU/L (ref 0.3–5)
TURBIDITY: CLEAR
URINE HGB: ABNORMAL
UROBILINOGEN, URINE: NORMAL
WBC # BLD: 9 K/UL (ref 3.5–11.3)
WBC # BLD: ABNORMAL 10*3/UL
WBC UA: ABNORMAL /HPF (ref 0–5)
YEAST: ABNORMAL

## 2019-07-28 PROCEDURE — 83690 ASSAY OF LIPASE: CPT

## 2019-07-28 PROCEDURE — B01B1ZZ FLUOROSCOPY OF SPINAL CORD USING LOW OSMOLAR CONTRAST: ICD-10-PCS | Performed by: NEUROLOGICAL SURGERY

## 2019-07-28 PROCEDURE — 6360000002 HC RX W HCPCS: Performed by: NURSE PRACTITIONER

## 2019-07-28 PROCEDURE — 99285 EMERGENCY DEPT VISIT HI MDM: CPT

## 2019-07-28 PROCEDURE — 85025 COMPLETE CBC W/AUTO DIFF WBC: CPT

## 2019-07-28 PROCEDURE — 81001 URINALYSIS AUTO W/SCOPE: CPT

## 2019-07-28 PROCEDURE — 6360000004 HC RX CONTRAST MEDICATION: Performed by: EMERGENCY MEDICINE

## 2019-07-28 PROCEDURE — 96374 THER/PROPH/DIAG INJ IV PUSH: CPT

## 2019-07-28 PROCEDURE — 84484 ASSAY OF TROPONIN QUANT: CPT

## 2019-07-28 PROCEDURE — 96375 TX/PRO/DX INJ NEW DRUG ADDON: CPT

## 2019-07-28 PROCEDURE — 84443 ASSAY THYROID STIM HORMONE: CPT

## 2019-07-28 PROCEDURE — 2580000003 HC RX 258: Performed by: EMERGENCY MEDICINE

## 2019-07-28 PROCEDURE — 009U3ZX DRAINAGE OF SPINAL CANAL, PERCUTANEOUS APPROACH, DIAGNOSTIC: ICD-10-PCS | Performed by: NEUROLOGICAL SURGERY

## 2019-07-28 PROCEDURE — 6360000002 HC RX W HCPCS: Performed by: EMERGENCY MEDICINE

## 2019-07-28 PROCEDURE — 87641 MR-STAPH DNA AMP PROBE: CPT

## 2019-07-28 PROCEDURE — 70450 CT HEAD/BRAIN W/O DYE: CPT

## 2019-07-28 PROCEDURE — 2580000003 HC RX 258: Performed by: NURSE PRACTITIONER

## 2019-07-28 PROCEDURE — 2000000003 HC NEURO ICU R&B

## 2019-07-28 PROCEDURE — 74177 CT ABD & PELVIS W/CONTRAST: CPT

## 2019-07-28 PROCEDURE — 71046 X-RAY EXAM CHEST 2 VIEWS: CPT

## 2019-07-28 PROCEDURE — 80053 COMPREHEN METABOLIC PANEL: CPT

## 2019-07-28 PROCEDURE — 70544 MR ANGIOGRAPHY HEAD W/O DYE: CPT

## 2019-07-28 PROCEDURE — 83735 ASSAY OF MAGNESIUM: CPT

## 2019-07-28 PROCEDURE — 70551 MRI BRAIN STEM W/O DYE: CPT

## 2019-07-28 PROCEDURE — 93005 ELECTROCARDIOGRAM TRACING: CPT | Performed by: NURSE PRACTITIONER

## 2019-07-28 RX ORDER — DICYCLOMINE HYDROCHLORIDE 10 MG/1
10 CAPSULE ORAL
Status: DISCONTINUED | OUTPATIENT
Start: 2019-07-29 | End: 2019-07-29

## 2019-07-28 RX ORDER — OXYCODONE HYDROCHLORIDE 5 MG/1
10 TABLET ORAL EVERY 4 HOURS PRN
Status: DISCONTINUED | OUTPATIENT
Start: 2019-07-28 | End: 2019-07-29

## 2019-07-28 RX ORDER — SODIUM CHLORIDE 0.9 % (FLUSH) 0.9 %
10 SYRINGE (ML) INJECTION PRN
Status: DISCONTINUED | OUTPATIENT
Start: 2019-07-28 | End: 2019-07-28 | Stop reason: HOSPADM

## 2019-07-28 RX ORDER — ONDANSETRON 4 MG/1
8 TABLET, FILM COATED ORAL EVERY 8 HOURS PRN
Status: DISCONTINUED | OUTPATIENT
Start: 2019-07-28 | End: 2019-07-30 | Stop reason: HOSPADM

## 2019-07-28 RX ORDER — HYDROMORPHONE HYDROCHLORIDE 1 MG/ML
0.5 INJECTION, SOLUTION INTRAMUSCULAR; INTRAVENOUS; SUBCUTANEOUS ONCE
Status: COMPLETED | OUTPATIENT
Start: 2019-07-28 | End: 2019-07-28

## 2019-07-28 RX ORDER — SODIUM CHLORIDE 9 MG/ML
INJECTION, SOLUTION INTRAVENOUS CONTINUOUS
Status: DISCONTINUED | OUTPATIENT
Start: 2019-07-28 | End: 2019-07-28 | Stop reason: HOSPADM

## 2019-07-28 RX ORDER — SODIUM CHLORIDE 0.9 % (FLUSH) 0.9 %
10 SYRINGE (ML) INJECTION EVERY 12 HOURS SCHEDULED
Status: DISCONTINUED | OUTPATIENT
Start: 2019-07-29 | End: 2019-07-30 | Stop reason: HOSPADM

## 2019-07-28 RX ORDER — SIMVASTATIN 20 MG
20 TABLET ORAL NIGHTLY
Status: DISCONTINUED | OUTPATIENT
Start: 2019-07-29 | End: 2019-07-30 | Stop reason: HOSPADM

## 2019-07-28 RX ORDER — SODIUM CHLORIDE 9 MG/ML
INJECTION, SOLUTION INTRAVENOUS CONTINUOUS
Status: DISCONTINUED | OUTPATIENT
Start: 2019-07-29 | End: 2019-07-30

## 2019-07-28 RX ORDER — TIZANIDINE 2 MG/1
2 TABLET ORAL EVERY 8 HOURS PRN
COMMUNITY
End: 2020-10-13 | Stop reason: ALTCHOICE

## 2019-07-28 RX ORDER — LABETALOL 20 MG/4 ML (5 MG/ML) INTRAVENOUS SYRINGE
10 EVERY 10 MIN PRN
Status: DISCONTINUED | OUTPATIENT
Start: 2019-07-28 | End: 2019-07-30 | Stop reason: HOSPADM

## 2019-07-28 RX ORDER — LORAZEPAM 2 MG/ML
1 INJECTION INTRAMUSCULAR ONCE
Status: COMPLETED | OUTPATIENT
Start: 2019-07-28 | End: 2019-07-28

## 2019-07-28 RX ORDER — PANTOPRAZOLE SODIUM 40 MG/1
40 TABLET, DELAYED RELEASE ORAL
Status: DISCONTINUED | OUTPATIENT
Start: 2019-07-29 | End: 2019-07-30 | Stop reason: HOSPADM

## 2019-07-28 RX ORDER — MORPHINE SULFATE 4 MG/ML
4 INJECTION, SOLUTION INTRAMUSCULAR; INTRAVENOUS
Status: DISCONTINUED | OUTPATIENT
Start: 2019-07-28 | End: 2019-07-29

## 2019-07-28 RX ORDER — DEXAMETHASONE SODIUM PHOSPHATE 10 MG/ML
10 INJECTION INTRAMUSCULAR; INTRAVENOUS ONCE
Status: COMPLETED | OUTPATIENT
Start: 2019-07-28 | End: 2019-07-28

## 2019-07-28 RX ORDER — SODIUM CHLORIDE 0.9 % (FLUSH) 0.9 %
10 SYRINGE (ML) INJECTION PRN
Status: DISCONTINUED | OUTPATIENT
Start: 2019-07-28 | End: 2019-07-30 | Stop reason: HOSPADM

## 2019-07-28 RX ORDER — TRAZODONE HYDROCHLORIDE 100 MG/1
200 TABLET ORAL NIGHTLY PRN
Status: DISCONTINUED | OUTPATIENT
Start: 2019-07-29 | End: 2019-07-29

## 2019-07-28 RX ORDER — 0.9 % SODIUM CHLORIDE 0.9 %
1000 INTRAVENOUS SOLUTION INTRAVENOUS ONCE
Status: COMPLETED | OUTPATIENT
Start: 2019-07-28 | End: 2019-07-28

## 2019-07-28 RX ORDER — 0.9 % SODIUM CHLORIDE 0.9 %
80 INTRAVENOUS SOLUTION INTRAVENOUS ONCE
Status: COMPLETED | OUTPATIENT
Start: 2019-07-28 | End: 2019-07-28

## 2019-07-28 RX ORDER — OXYCODONE HYDROCHLORIDE 5 MG/1
5 TABLET ORAL EVERY 4 HOURS PRN
Status: DISCONTINUED | OUTPATIENT
Start: 2019-07-28 | End: 2019-07-29

## 2019-07-28 RX ORDER — LEVETIRACETAM 10 MG/ML
1000 INJECTION INTRAVASCULAR ONCE
Status: CANCELLED | OUTPATIENT
Start: 2019-07-28 | End: 2019-07-28

## 2019-07-28 RX ORDER — DIPHENHYDRAMINE HYDROCHLORIDE 50 MG/ML
25 INJECTION INTRAMUSCULAR; INTRAVENOUS ONCE
Status: COMPLETED | OUTPATIENT
Start: 2019-07-28 | End: 2019-07-28

## 2019-07-28 RX ORDER — METOCLOPRAMIDE HYDROCHLORIDE 5 MG/ML
10 INJECTION INTRAMUSCULAR; INTRAVENOUS ONCE
Status: COMPLETED | OUTPATIENT
Start: 2019-07-28 | End: 2019-07-28

## 2019-07-28 RX ORDER — ACETAMINOPHEN 500 MG
1000 TABLET ORAL EVERY 6 HOURS PRN
Status: DISCONTINUED | OUTPATIENT
Start: 2019-07-28 | End: 2019-07-29

## 2019-07-28 RX ORDER — METHOCARBAMOL 500 MG/1
500 TABLET, FILM COATED ORAL 3 TIMES DAILY PRN
Status: DISCONTINUED | OUTPATIENT
Start: 2019-07-28 | End: 2019-07-30 | Stop reason: HOSPADM

## 2019-07-28 RX ORDER — LEVETIRACETAM 5 MG/ML
500 INJECTION INTRAVASCULAR EVERY 12 HOURS
Status: DISCONTINUED | OUTPATIENT
Start: 2019-07-29 | End: 2019-07-30

## 2019-07-28 RX ORDER — MORPHINE SULFATE 2 MG/ML
2 INJECTION, SOLUTION INTRAMUSCULAR; INTRAVENOUS
Status: DISCONTINUED | OUTPATIENT
Start: 2019-07-28 | End: 2019-07-29

## 2019-07-28 RX ADMIN — Medication 10 ML: at 18:43

## 2019-07-28 RX ADMIN — SODIUM CHLORIDE 1000 ML: 9 INJECTION, SOLUTION INTRAVENOUS at 17:05

## 2019-07-28 RX ADMIN — HYDROMORPHONE HYDROCHLORIDE 0.5 MG: 1 INJECTION, SOLUTION INTRAMUSCULAR; INTRAVENOUS; SUBCUTANEOUS at 19:37

## 2019-07-28 RX ADMIN — SODIUM CHLORIDE: 9 INJECTION, SOLUTION INTRAVENOUS at 18:59

## 2019-07-28 RX ADMIN — HYDROMORPHONE HYDROCHLORIDE 1 MG: 1 INJECTION, SOLUTION INTRAMUSCULAR; INTRAVENOUS; SUBCUTANEOUS at 21:33

## 2019-07-28 RX ADMIN — SODIUM CHLORIDE 80 ML: 9 INJECTION, SOLUTION INTRAVENOUS at 18:43

## 2019-07-28 RX ADMIN — DEXAMETHASONE SODIUM PHOSPHATE 10 MG: 10 INJECTION INTRAMUSCULAR; INTRAVENOUS at 17:05

## 2019-07-28 RX ADMIN — DIPHENHYDRAMINE HYDROCHLORIDE 25 MG: 50 INJECTION, SOLUTION INTRAMUSCULAR; INTRAVENOUS at 17:05

## 2019-07-28 RX ADMIN — METOCLOPRAMIDE 10 MG: 5 INJECTION, SOLUTION INTRAMUSCULAR; INTRAVENOUS at 17:05

## 2019-07-28 RX ADMIN — LORAZEPAM 1 MG: 2 INJECTION INTRAMUSCULAR; INTRAVENOUS at 22:30

## 2019-07-28 RX ADMIN — LEVETIRACETAM 1500 MG: 100 INJECTION, SOLUTION INTRAVENOUS at 20:03

## 2019-07-28 RX ADMIN — IOPAMIDOL 75 ML: 755 INJECTION, SOLUTION INTRAVENOUS at 18:43

## 2019-07-28 ASSESSMENT — ENCOUNTER SYMPTOMS
EYE REDNESS: 0
ABDOMINAL PAIN: 1
RHINORRHEA: 0
DIARRHEA: 0
BACK PAIN: 1
PHOTOPHOBIA: 1
NAUSEA: 1
VOMITING: 0
BACK PAIN: 0
ABDOMINAL PAIN: 0
SHORTNESS OF BREATH: 0
VOMITING: 0
EYE ITCHING: 0
COUGH: 0
SHORTNESS OF BREATH: 0
NAUSEA: 1
DIARRHEA: 1

## 2019-07-28 ASSESSMENT — PAIN SCALES - GENERAL
PAINLEVEL_OUTOF10: 10

## 2019-07-28 ASSESSMENT — PAIN DESCRIPTION - FREQUENCY: FREQUENCY: CONTINUOUS

## 2019-07-28 ASSESSMENT — PAIN DESCRIPTION - DESCRIPTORS
DESCRIPTORS: THROBBING;STABBING
DESCRIPTORS: SHARP

## 2019-07-28 ASSESSMENT — PAIN DESCRIPTION - PAIN TYPE: TYPE: ACUTE PAIN

## 2019-07-28 ASSESSMENT — PAIN SCALES - WONG BAKER: WONGBAKER_NUMERICALRESPONSE: 10

## 2019-07-28 ASSESSMENT — PAIN DESCRIPTION - LOCATION
LOCATION: HEAD
LOCATION: HEAD

## 2019-07-28 NOTE — ED PROVIDER NOTES
colonoscopy w/biopsy single/multiple (N/A, 4/24/2017); Upper gastrointestinal endoscopy (05/02/2017); pr egd transoral biopsy single/multiple (N/A, 5/2/2017); sinus surgery; Dilation and curettage of uterus; Colonoscopy (05/04/2017); pr colonoscopy w/biopsy single/multiple (N/A, 5/4/2017); and Colonoscopy (04/24/2017). Social History     Socioeconomic History    Marital status:      Spouse name: Not on file    Number of children: Not on file    Years of education: Not on file    Highest education level: Not on file   Occupational History    Not on file   Social Needs    Financial resource strain: Not on file    Food insecurity:     Worry: Not on file     Inability: Not on file    Transportation needs:     Medical: Not on file     Non-medical: Not on file   Tobacco Use    Smoking status: Current Every Day Smoker     Packs/day: 1.00     Types: Cigarettes    Smokeless tobacco: Never Used    Tobacco comment: almost off cigarettes, but now vaping. Substance and Sexual Activity    Alcohol use: No     Alcohol/week: 0.0 standard drinks     Comment: denies any heavy use.  Drug use: Yes     Types: Marijuana     Comment: Medical marijuana card verified.      Sexual activity: Not Currently     Partners: Male     Comment: spouse    Lifestyle    Physical activity:     Days per week: Not on file     Minutes per session: Not on file    Stress: Not on file   Relationships    Social connections:     Talks on phone: Not on file     Gets together: Not on file     Attends Religion service: Not on file     Active member of club or organization: Not on file     Attends meetings of clubs or organizations: Not on file     Relationship status: Not on file    Intimate partner violence:     Fear of current or ex partner: Not on file     Emotionally abused: Not on file     Physically abused: Not on file     Forced sexual activity: Not on file   Other Topics Concern    Not on file   Social History Narrative    Pulmonary/Chest: Effort normal. No respiratory distress. Abdominal: Soft. There is tenderness (Mild diffuse). Musculoskeletal: Normal range of motion. Neurological: She is alert and oriented to person, place, and time. Skin: Skin is warm and dry. Capillary refill takes less than 2 seconds. Psychiatric: She has a normal mood and affect. Her behavior is normal. Judgment and thought content normal.   Nursing note and vitals reviewed. DIFFERENTIAL  DIAGNOSIS   Dehydration, migraine, diarrhea, liver dysfunction, pancreatitis, UTI    PLAN (LABS / IMAGING / EKG):  Orders Placed This Encounter   Procedures    CT Head WO Contrast    CT ABDOMEN PELVIS W IV CONTRAST Additional Contrast? None    XR CHEST STANDARD (2 VW)    CBC Auto Differential    Comprehensive Metabolic Panel    Lipase    Urinalysis Reflex to Culture    Troponin    Microscopic Urinalysis    Magnesium    TSH with Reflex    Telemetry monitoring    EKG 12 Lead    Insert peripheral IV       MEDICATIONS ORDERED:  Orders Placed This Encounter   Medications    0.9 % sodium chloride bolus    diphenhydrAMINE (BENADRYL) injection 25 mg    metoclopramide (REGLAN) injection 10 mg    dexamethasone (DECADRON) injection 10 mg    0.9 % sodium chloride infusion    0.9 % sodium chloride bolus    sodium chloride flush 0.9 % injection 10 mL    iopamidol (ISOVUE-370) 76 % injection 75 mL    HYDROmorphone HCl PF (DILAUDID) injection 0.5 mg    levETIRAcetam (KEPPRA) 1,500 mg in sodium chloride 0.9 % 100 mL IVPB       Controlled Substances Monitoring:      DIAGNOSTIC RESULTS / EMERGENCY DEPARTMENT COURSE / MDM     RADIOLOGY:   I directly visualized(with the attending physician) the following  images and reviewed the radiologist interpretations:  No results found. XR CHEST STANDARD (2 VW)   Final Result   No acute cardiopulmonary disease.          CT ABDOMEN PELVIS W IV CONTRAST Additional Contrast? None   Final Result   Mild sigmoid colon

## 2019-07-28 NOTE — ED PROVIDER NOTES
EMERGENCY DEPARTMENT ENCOUNTER   ATTENDING ATTESTATION     Pt Name: Christiane Farrell  MRN: [id number]  [place] 1960  Date of evaluation: 7/28/19   Christiane Farrell is a 62 y.o. female with CC: Fatigue (onset 1 week, seen PCP 7/18); Migraine; and Diarrhea    MDM:   3 episodes of syncope this week ,, sometimes after diarrhea  Having headaches all week  Recently taken off pain meds, and put on clonidine patch  Diarrhea all week 2-3 times per day, no travel, no abx recently  Denies cp  Having abd pain also    Having abnormal finger to nose with ataxia at home also, uses a cane, has chronic back pain and anxiety   helps her at home    Off all narcotic pain meds, on catapress patch only now, chronic back pain issues    She will need admit for symptomatic bradycardia and recurrent syncope. Her ekg shows SB with rate 44 bpm  Checking ct head and abd, tsh, cxr, cardiac monitor    Patient will be signed out to evening Dr Cassandra Robledo at 7 pm.  Ct studies pending       EKG: All EKG's are interpreted by the Emergency Department Physician who either signs or Co-signs this chart in the absence of a cardiologist.  Arletta Tiny, nonspecific changes and lateral t wave inversions, no acute ischemic changes on ST segments, no old      RADIOLOGY:All plain film, CT, MRI, and formal ultrasound images (except ED bedside ultrasound) are read by the radiologist, see reports below, unless otherwise noted in MDM or here. CT Head WO Contrast    (Results Pending)   CT ABDOMEN PELVIS W IV CONTRAST Additional Contrast? None    (Results Pending)   XR CHEST STANDARD (2 VW)    (Results Pending)     LABS: All lab results were reviewed by myself, and all abnormals are listed below.   Labs Reviewed   CBC WITH AUTO DIFFERENTIAL - Abnormal; Notable for the following components:       Result Value    RBC 5.17 (*)     Hemoglobin 15.3 (*)     Immature Granulocytes 1 (*)     All other components within normal limits   COMPREHENSIVE METABOLIC PANEL - Abnormal;

## 2019-07-29 ENCOUNTER — APPOINTMENT (OUTPATIENT)
Dept: CT IMAGING | Age: 59
DRG: 065 | End: 2019-07-29
Payer: COMMERCIAL

## 2019-07-29 ENCOUNTER — APPOINTMENT (OUTPATIENT)
Dept: INTERVENTIONAL RADIOLOGY/VASCULAR | Age: 59
DRG: 065 | End: 2019-07-29
Payer: COMMERCIAL

## 2019-07-29 LAB
ALBUMIN SERPL-MCNC: 3.5 G/DL (ref 3.5–5.2)
ALBUMIN/GLOBULIN RATIO: 1.3 (ref 1–2.5)
ALP BLD-CCNC: 128 U/L (ref 35–104)
ALT SERPL-CCNC: 9 U/L (ref 5–33)
ANION GAP SERPL CALCULATED.3IONS-SCNC: 11 MMOL/L (ref 9–17)
APPEARANCE CSF: CLEAR
AST SERPL-CCNC: 12 U/L
BILIRUB SERPL-MCNC: <0.1 MG/DL (ref 0.3–1.2)
BILIRUBIN DIRECT: <0.08 MG/DL
BILIRUBIN, INDIRECT: ABNORMAL MG/DL (ref 0–1)
BUN BLDV-MCNC: 15 MG/DL (ref 6–20)
BUN/CREAT BLD: ABNORMAL (ref 9–20)
CALCIUM IONIZED: 1.22 MMOL/L (ref 1.13–1.33)
CALCIUM SERPL-MCNC: 8.5 MG/DL (ref 8.6–10.4)
CHLORIDE BLD-SCNC: 109 MMOL/L (ref 98–107)
CO2: 19 MMOL/L (ref 20–31)
CREAT SERPL-MCNC: 0.78 MG/DL (ref 0.5–0.9)
CRYPTOCOCCUS NEOFORMANS/GATTI CSF FILM ARR.: NOT DETECTED
CULTURE: NORMAL
CYTOMEGALOVIRUS (CMV) CSF FILM ARRAY: NOT DETECTED
DIRECT EXAM: NORMAL
DIRECT EXAM: NORMAL
ENTEROVIRUS CSF FILM ARRAY: NOT DETECTED
ESCHERICHIA COLI K1 CSF FILM ARRAY: NOT DETECTED
ESTIMATED AVERAGE GLUCOSE: 137 MG/DL
GFR AFRICAN AMERICAN: >60 ML/MIN
GFR NON-AFRICAN AMERICAN: >60 ML/MIN
GFR SERPL CREATININE-BSD FRML MDRD: ABNORMAL ML/MIN/{1.73_M2}
GFR SERPL CREATININE-BSD FRML MDRD: ABNORMAL ML/MIN/{1.73_M2}
GLOBULIN: ABNORMAL G/DL (ref 1.5–3.8)
GLUCOSE BLD-MCNC: 109 MG/DL (ref 65–105)
GLUCOSE BLD-MCNC: 114 MG/DL (ref 65–105)
GLUCOSE BLD-MCNC: 239 MG/DL (ref 70–99)
GLUCOSE, CSF: 79 MG/DL (ref 40–70)
HAEMOPHILUS INFLUENZA CSF FILM ARRAY: NOT DETECTED
HBA1C MFR BLD: 6.4 % (ref 4–6)
HCT VFR BLD CALC: 43.2 % (ref 36.3–47.1)
HEMOGLOBIN: 13.8 G/DL (ref 11.9–15.1)
HHV-6 (HERPESVIRUS 6) CSF FILM ARRAY: NOT DETECTED
HSV-1 CSF FILM ARRAY: NOT DETECTED
HSV-2 CSF FILM ARRAY: NOT DETECTED
LIPASE: 63 U/L (ref 13–60)
LISTERIA MONOCYTOGENES CSF FILM ARRAY: NOT DETECTED
LV EF: 59 %
LVEF MODALITY: NORMAL
Lab: NORMAL
Lab: NORMAL
MCH RBC QN AUTO: 29.7 PG (ref 25.2–33.5)
MCHC RBC AUTO-ENTMCNC: 31.9 G/DL (ref 28.4–34.8)
MCV RBC AUTO: 92.9 FL (ref 82.6–102.9)
MRSA, DNA, NASAL: NORMAL
NEISSERIA MENIGITIDIS CSF FILM ARRAY: NOT DETECTED
NRBC AUTOMATED: 0 PER 100 WBC
PARECHOVIRUS CSF FILM ARRAY: NOT DETECTED
PDW BLD-RTO: 13.1 % (ref 11.8–14.4)
PLATELET # BLD: 175 K/UL (ref 138–453)
PMV BLD AUTO: 11 FL (ref 8.1–13.5)
POTASSIUM SERPL-SCNC: 3.8 MMOL/L (ref 3.7–5.3)
PROTEIN CSF: 58.3 MG/DL (ref 15–45)
RBC # BLD: 4.65 M/UL (ref 3.95–5.11)
RBC CSF: 1 /MM3
SODIUM BLD-SCNC: 139 MMOL/L (ref 135–144)
SPECIMEN DESCRIPTION: NORMAL
STREPTOCOCCUS AGALACTIAE CSF FILM ARRAY: NOT DETECTED
STREPTOCOCCUS PNEUMONIAE CSF FILM ARRAY: NOT DETECTED
SUPERNAT COLOR CSF: ABNORMAL
TOTAL PROTEIN: 6.2 G/DL (ref 6.4–8.3)
TUBE NUMBER CSF: 2
VARICELLA-ZOSTER CSF FILM ARRAY: NOT DETECTED
VOLUME CSF: 5
WBC # BLD: 9.1 K/UL (ref 3.5–11.3)
WBC CSF: 1 /MM3
XANTHOCHROMIA: ABNORMAL

## 2019-07-29 PROCEDURE — 85250 CLOT FACTOR IX PTC/CHRSTMAS: CPT

## 2019-07-29 PROCEDURE — 85246 CLOT FACTOR VIII VW ANTIGEN: CPT

## 2019-07-29 PROCEDURE — 81241 F5 GENE: CPT

## 2019-07-29 PROCEDURE — 2580000003 HC RX 258: Performed by: STUDENT IN AN ORGANIZED HEALTH CARE EDUCATION/TRAINING PROGRAM

## 2019-07-29 PROCEDURE — C1894 INTRO/SHEATH, NON-LASER: HCPCS

## 2019-07-29 PROCEDURE — 90792 PSYCH DIAG EVAL W/MED SRVCS: CPT | Performed by: NURSE PRACTITIONER

## 2019-07-29 PROCEDURE — 87070 CULTURE OTHR SPECIMN AEROBIC: CPT

## 2019-07-29 PROCEDURE — 36415 COLL VENOUS BLD VENIPUNCTURE: CPT

## 2019-07-29 PROCEDURE — 77003 FLUOROGUIDE FOR SPINE INJECT: CPT | Performed by: PSYCHIATRY & NEUROLOGY

## 2019-07-29 PROCEDURE — B3151ZZ FLUOROSCOPY OF BILATERAL COMMON CAROTID ARTERIES USING LOW OSMOLAR CONTRAST: ICD-10-PCS | Performed by: PSYCHIATRY & NEUROLOGY

## 2019-07-29 PROCEDURE — 85270 CLOT FACTOR XI PTA: CPT

## 2019-07-29 PROCEDURE — 85280 CLOT FACTOR XII HAGEMAN: CPT

## 2019-07-29 PROCEDURE — 76377 3D RENDER W/INTRP POSTPROCES: CPT | Performed by: PSYCHIATRY & NEUROLOGY

## 2019-07-29 PROCEDURE — 99152 MOD SED SAME PHYS/QHP 5/>YRS: CPT | Performed by: PSYCHIATRY & NEUROLOGY

## 2019-07-29 PROCEDURE — B3121ZZ FLUOROSCOPY OF LEFT SUBCLAVIAN ARTERY USING LOW OSMOLAR CONTRAST: ICD-10-PCS | Performed by: PSYCHIATRY & NEUROLOGY

## 2019-07-29 PROCEDURE — 85220 BLOOC CLOT FACTOR V TEST: CPT

## 2019-07-29 PROCEDURE — 2000000003 HC NEURO ICU R&B

## 2019-07-29 PROCEDURE — 80076 HEPATIC FUNCTION PANEL: CPT

## 2019-07-29 PROCEDURE — 89050 BODY FLUID CELL COUNT: CPT

## 2019-07-29 PROCEDURE — 85230 CLOT FACTOR VII PROCONVERTIN: CPT

## 2019-07-29 PROCEDURE — 82945 GLUCOSE OTHER FLUID: CPT

## 2019-07-29 PROCEDURE — 87529 HSV DNA AMP PROBE: CPT

## 2019-07-29 PROCEDURE — C1887 CATHETER, GUIDING: HCPCS

## 2019-07-29 PROCEDURE — C1760 CLOSURE DEV, VASC: HCPCS

## 2019-07-29 PROCEDURE — 85210 CLOT FACTOR II PROTHROM SPEC: CPT

## 2019-07-29 PROCEDURE — 6370000000 HC RX 637 (ALT 250 FOR IP): Performed by: EMERGENCY MEDICINE

## 2019-07-29 PROCEDURE — 87205 SMEAR GRAM STAIN: CPT

## 2019-07-29 PROCEDURE — 62270 DX LMBR SPI PNXR: CPT | Performed by: PSYCHIATRY & NEUROLOGY

## 2019-07-29 PROCEDURE — 85027 COMPLETE CBC AUTOMATED: CPT

## 2019-07-29 PROCEDURE — 6360000002 HC RX W HCPCS: Performed by: STUDENT IN AN ORGANIZED HEALTH CARE EDUCATION/TRAINING PROGRAM

## 2019-07-29 PROCEDURE — 92523 SPEECH SOUND LANG COMPREHEN: CPT

## 2019-07-29 PROCEDURE — 85247 CLOT FACTOR VIII MULTIMETRIC: CPT

## 2019-07-29 PROCEDURE — 2709999900 HC NON-CHARGEABLE SUPPLY

## 2019-07-29 PROCEDURE — 85260 CLOT FACTOR X STUART-POWER: CPT

## 2019-07-29 PROCEDURE — 6360000002 HC RX W HCPCS: Performed by: EMERGENCY MEDICINE

## 2019-07-29 PROCEDURE — 2580000003 HC RX 258: Performed by: EMERGENCY MEDICINE

## 2019-07-29 PROCEDURE — B31R1ZZ FLUOROSCOPY OF INTRACRANIAL ARTERIES USING LOW OSMOLAR CONTRAST: ICD-10-PCS | Performed by: PSYCHIATRY & NEUROLOGY

## 2019-07-29 PROCEDURE — B3111ZZ FLUOROSCOPY OF RIGHT BRACHIOCEPHALIC-SUBCLAVIAN ARTERY USING LOW OSMOLAR CONTRAST: ICD-10-PCS | Performed by: PSYCHIATRY & NEUROLOGY

## 2019-07-29 PROCEDURE — B3181ZZ FLUOROSCOPY OF BILATERAL INTERNAL CAROTID ARTERIES USING LOW OSMOLAR CONTRAST: ICD-10-PCS | Performed by: PSYCHIATRY & NEUROLOGY

## 2019-07-29 PROCEDURE — 99222 1ST HOSP IP/OBS MODERATE 55: CPT | Performed by: PSYCHIATRY & NEUROLOGY

## 2019-07-29 PROCEDURE — 99255 IP/OBS CONSLTJ NEW/EST HI 80: CPT | Performed by: PSYCHIATRY & NEUROLOGY

## 2019-07-29 PROCEDURE — C1769 GUIDE WIRE: HCPCS

## 2019-07-29 PROCEDURE — 36226 PLACE CATH VERTEBRAL ART: CPT | Performed by: PSYCHIATRY & NEUROLOGY

## 2019-07-29 PROCEDURE — 85306 CLOT INHIBIT PROT S FREE: CPT

## 2019-07-29 PROCEDURE — 80048 BASIC METABOLIC PNL TOTAL CA: CPT

## 2019-07-29 PROCEDURE — 87483 CNS DNA AMP PROBE TYPE 12-25: CPT

## 2019-07-29 PROCEDURE — 82330 ASSAY OF CALCIUM: CPT

## 2019-07-29 PROCEDURE — 87015 SPECIMEN INFECT AGNT CONCNTJ: CPT

## 2019-07-29 PROCEDURE — 36224 PLACE CATH CAROTD ART: CPT | Performed by: PSYCHIATRY & NEUROLOGY

## 2019-07-29 PROCEDURE — B41F1ZZ FLUOROSCOPY OF RIGHT LOWER EXTREMITY ARTERIES USING LOW OSMOLAR CONTRAST: ICD-10-PCS | Performed by: PSYCHIATRY & NEUROLOGY

## 2019-07-29 PROCEDURE — 85290 CLOT FACTOR XIII FIBRIN STAB: CPT

## 2019-07-29 PROCEDURE — 93306 TTE W/DOPPLER COMPLETE: CPT

## 2019-07-29 PROCEDURE — 6360000002 HC RX W HCPCS

## 2019-07-29 PROCEDURE — 85305 CLOT INHIBIT PROT S TOTAL: CPT

## 2019-07-29 PROCEDURE — 84157 ASSAY OF PROTEIN OTHER: CPT

## 2019-07-29 PROCEDURE — 83036 HEMOGLOBIN GLYCOSYLATED A1C: CPT

## 2019-07-29 PROCEDURE — 85303 CLOT INHIBIT PROT C ACTIVITY: CPT

## 2019-07-29 PROCEDURE — 85302 CLOT INHIBIT PROT C ANTIGEN: CPT

## 2019-07-29 PROCEDURE — 85245 CLOT FACTOR VIII VW RISTOCTN: CPT

## 2019-07-29 PROCEDURE — 70450 CT HEAD/BRAIN W/O DYE: CPT

## 2019-07-29 PROCEDURE — 82947 ASSAY GLUCOSE BLOOD QUANT: CPT

## 2019-07-29 PROCEDURE — 85240 CLOT FACTOR VIII AHG 1 STAGE: CPT

## 2019-07-29 PROCEDURE — 6360000004 HC RX CONTRAST MEDICATION: Performed by: PSYCHIATRY & NEUROLOGY

## 2019-07-29 PROCEDURE — 83690 ASSAY OF LIPASE: CPT

## 2019-07-29 PROCEDURE — 85335 FACTOR INHIBITOR TEST: CPT

## 2019-07-29 PROCEDURE — 86160 COMPLEMENT ANTIGEN: CPT

## 2019-07-29 RX ORDER — IODIXANOL 270 MG/ML
200 INJECTION, SOLUTION INTRAVASCULAR
Status: COMPLETED | OUTPATIENT
Start: 2019-07-29 | End: 2019-07-29

## 2019-07-29 RX ORDER — HYDROXYZINE HYDROCHLORIDE 50 MG/ML
25 INJECTION, SOLUTION INTRAMUSCULAR 3 TIMES DAILY PRN
Status: DISCONTINUED | OUTPATIENT
Start: 2019-07-29 | End: 2019-07-30

## 2019-07-29 RX ORDER — MORPHINE SULFATE 4 MG/ML
4 INJECTION, SOLUTION INTRAMUSCULAR; INTRAVENOUS EVERY 8 HOURS PRN
Status: DISCONTINUED | OUTPATIENT
Start: 2019-07-29 | End: 2019-07-30

## 2019-07-29 RX ORDER — DEXTROSE MONOHYDRATE 50 MG/ML
100 INJECTION, SOLUTION INTRAVENOUS PRN
Status: DISCONTINUED | OUTPATIENT
Start: 2019-07-29 | End: 2019-07-30 | Stop reason: HOSPADM

## 2019-07-29 RX ORDER — DEXTROSE MONOHYDRATE 25 G/50ML
12.5 INJECTION, SOLUTION INTRAVENOUS PRN
Status: DISCONTINUED | OUTPATIENT
Start: 2019-07-29 | End: 2019-07-30 | Stop reason: HOSPADM

## 2019-07-29 RX ORDER — LIDOCAINE 4 G/G
1 PATCH TOPICAL DAILY
Status: DISCONTINUED | OUTPATIENT
Start: 2019-07-29 | End: 2019-07-30 | Stop reason: HOSPADM

## 2019-07-29 RX ORDER — METOCLOPRAMIDE HYDROCHLORIDE 5 MG/ML
10 INJECTION INTRAMUSCULAR; INTRAVENOUS EVERY 8 HOURS
Status: DISCONTINUED | OUTPATIENT
Start: 2019-07-29 | End: 2019-07-30

## 2019-07-29 RX ORDER — MORPHINE SULFATE 2 MG/ML
2 INJECTION, SOLUTION INTRAMUSCULAR; INTRAVENOUS EVERY 8 HOURS PRN
Status: DISCONTINUED | OUTPATIENT
Start: 2019-07-29 | End: 2019-07-29

## 2019-07-29 RX ORDER — HYDROXYZINE HYDROCHLORIDE 50 MG/ML
50 INJECTION, SOLUTION INTRAMUSCULAR 3 TIMES DAILY PRN
Status: DISCONTINUED | OUTPATIENT
Start: 2019-07-29 | End: 2019-07-30

## 2019-07-29 RX ORDER — ACETAMINOPHEN 500 MG
1000 TABLET ORAL EVERY 6 HOURS PRN
Status: DISCONTINUED | OUTPATIENT
Start: 2019-07-29 | End: 2019-07-30 | Stop reason: HOSPADM

## 2019-07-29 RX ORDER — OXYCODONE HYDROCHLORIDE AND ACETAMINOPHEN 5; 325 MG/1; MG/1
1 TABLET ORAL EVERY 8 HOURS PRN
Status: DISCONTINUED | OUTPATIENT
Start: 2019-07-29 | End: 2019-07-29

## 2019-07-29 RX ORDER — ACETAMINOPHEN 325 MG/1
650 TABLET ORAL EVERY 4 HOURS PRN
Status: DISCONTINUED | OUTPATIENT
Start: 2019-07-29 | End: 2019-07-29

## 2019-07-29 RX ORDER — MORPHINE SULFATE 2 MG/ML
INJECTION, SOLUTION INTRAMUSCULAR; INTRAVENOUS
Status: COMPLETED
Start: 2019-07-29 | End: 2019-07-29

## 2019-07-29 RX ORDER — TRAZODONE HYDROCHLORIDE 100 MG/1
200 TABLET ORAL NIGHTLY
Status: DISCONTINUED | OUTPATIENT
Start: 2019-07-29 | End: 2019-07-30 | Stop reason: HOSPADM

## 2019-07-29 RX ORDER — OXYCODONE HYDROCHLORIDE 5 MG/1
5 TABLET ORAL EVERY 8 HOURS PRN
Status: DISCONTINUED | OUTPATIENT
Start: 2019-07-29 | End: 2019-07-30 | Stop reason: HOSPADM

## 2019-07-29 RX ORDER — MAGNESIUM SULFATE 1 G/100ML
INJECTION INTRAVENOUS
Status: DISPENSED
Start: 2019-07-29 | End: 2019-07-29

## 2019-07-29 RX ORDER — TRAZODONE HYDROCHLORIDE 100 MG/1
200 TABLET ORAL NIGHTLY
Status: DISCONTINUED | OUTPATIENT
Start: 2019-07-30 | End: 2019-07-29

## 2019-07-29 RX ORDER — NICOTINE POLACRILEX 4 MG
15 LOZENGE BUCCAL PRN
Status: DISCONTINUED | OUTPATIENT
Start: 2019-07-29 | End: 2019-07-30 | Stop reason: HOSPADM

## 2019-07-29 RX ADMIN — TOPIRAMATE 200 MG: 200 TABLET, FILM COATED ORAL at 04:14

## 2019-07-29 RX ADMIN — LEVETIRACETAM 500 MG: 5 INJECTION INTRAVENOUS at 08:37

## 2019-07-29 RX ADMIN — IODIXANOL 108 ML: 270 INJECTION, SOLUTION INTRAVASCULAR at 18:05

## 2019-07-29 RX ADMIN — MAGNESIUM SULFATE HEPTAHYDRATE 2 G: 500 INJECTION, SOLUTION INTRAMUSCULAR; INTRAVENOUS at 13:08

## 2019-07-29 RX ADMIN — METOCLOPRAMIDE 10 MG: 5 INJECTION, SOLUTION INTRAMUSCULAR; INTRAVENOUS at 13:08

## 2019-07-29 RX ADMIN — MORPHINE SULFATE 2 MG: 2 INJECTION, SOLUTION INTRAMUSCULAR; INTRAVENOUS at 04:14

## 2019-07-29 RX ADMIN — MORPHINE SULFATE 2 MG: 2 INJECTION, SOLUTION INTRAMUSCULAR; INTRAVENOUS at 18:39

## 2019-07-29 RX ADMIN — VITAMIN D, TAB 1000IU (100/BT) 5000 UNITS: 25 TAB at 08:37

## 2019-07-29 RX ADMIN — ACETAMINOPHEN 1000 MG: 500 TABLET ORAL at 00:22

## 2019-07-29 RX ADMIN — MORPHINE SULFATE 4 MG: 4 INJECTION INTRAVENOUS at 13:08

## 2019-07-29 RX ADMIN — SERTRALINE 100 MG: 50 TABLET, FILM COATED ORAL at 08:37

## 2019-07-29 RX ADMIN — LEVETIRACETAM 500 MG: 5 INJECTION INTRAVENOUS at 21:13

## 2019-07-29 RX ADMIN — TRAZODONE HYDROCHLORIDE 200 MG: 100 TABLET ORAL at 02:45

## 2019-07-29 RX ADMIN — OXYCODONE HYDROCHLORIDE 10 MG: 5 TABLET ORAL at 08:36

## 2019-07-29 RX ADMIN — METOCLOPRAMIDE 10 MG: 5 INJECTION, SOLUTION INTRAMUSCULAR; INTRAVENOUS at 21:41

## 2019-07-29 RX ADMIN — PANTOPRAZOLE SODIUM 40 MG: 40 TABLET, DELAYED RELEASE ORAL at 08:37

## 2019-07-29 RX ADMIN — MAGNESIUM SULFATE HEPTAHYDRATE 2 G: 500 INJECTION, SOLUTION INTRAMUSCULAR; INTRAVENOUS at 22:09

## 2019-07-29 RX ADMIN — TRAZODONE HYDROCHLORIDE 200 MG: 100 TABLET ORAL at 22:09

## 2019-07-29 RX ADMIN — DICYCLOMINE HYDROCHLORIDE 10 MG: 10 CAPSULE ORAL at 08:37

## 2019-07-29 RX ADMIN — Medication 10 ML: at 21:22

## 2019-07-29 RX ADMIN — METHOCARBAMOL TABLETS 500 MG: 500 TABLET, COATED ORAL at 21:19

## 2019-07-29 RX ADMIN — SODIUM CHLORIDE: 9 INJECTION, SOLUTION INTRAVENOUS at 00:00

## 2019-07-29 RX ADMIN — MORPHINE SULFATE 4 MG: 4 INJECTION INTRAVENOUS at 08:36

## 2019-07-29 RX ADMIN — HYDROXYZINE HYDROCHLORIDE 25 MG: 50 INJECTION, SOLUTION INTRAMUSCULAR at 22:09

## 2019-07-29 RX ADMIN — TOPIRAMATE 200 MG: 200 TABLET, FILM COATED ORAL at 21:19

## 2019-07-29 RX ADMIN — METOPROLOL TARTRATE 12.5 MG: 25 TABLET ORAL at 08:44

## 2019-07-29 ASSESSMENT — PAIN SCALES - WONG BAKER: WONGBAKER_NUMERICALRESPONSE: 2

## 2019-07-29 ASSESSMENT — PAIN DESCRIPTION - PAIN TYPE: TYPE: SURGICAL PAIN

## 2019-07-29 ASSESSMENT — PAIN SCALES - GENERAL
PAINLEVEL_OUTOF10: 3
PAINLEVEL_OUTOF10: 8
PAINLEVEL_OUTOF10: 10
PAINLEVEL_OUTOF10: 4
PAINLEVEL_OUTOF10: 10
PAINLEVEL_OUTOF10: 10
PAINLEVEL_OUTOF10: 5
PAINLEVEL_OUTOF10: 6

## 2019-07-29 ASSESSMENT — PAIN DESCRIPTION - FREQUENCY: FREQUENCY: CONTINUOUS

## 2019-07-29 ASSESSMENT — PAIN DESCRIPTION - LOCATION: LOCATION: BACK

## 2019-07-29 ASSESSMENT — PAIN DESCRIPTION - PROGRESSION: CLINICAL_PROGRESSION: NOT CHANGED

## 2019-07-29 ASSESSMENT — PAIN DESCRIPTION - ORIENTATION: ORIENTATION: LOWER

## 2019-07-29 ASSESSMENT — PAIN DESCRIPTION - ONSET: ONSET: ON-GOING

## 2019-07-29 NOTE — OP NOTE
[facility]    NEUROENDOVASCULAR SERVICE: POST-OP NOTE: 7/29/2019    Pt Name: Jami Gaspar  MRN: [id number]  YOB: 1960  Date of Procedure: 7/29/2019  Primary Care Physician: Sally Rodriguez MD    Pre-Procedural Diagnosis:SAH  Post-Procedural Diagnosis:same    Procedure Performed:Conventional cerebral angiogram     Surgeon:   Chi Brown MD    1st Assistant:  Edith Kc    Fellow:  Edmar Sanches     PRE-PROCEDURAL EXAM:  MODIFIED YURIY SCORE: 2 - Slight disability:  unable to carry out all previous activities, but able to look after own affairs without assistance. Neurological exam performed and unchanged from initial H&P or consult    Anesthesia: IV Moderate Sedation  Complications: none    Intra-Operative EXAM:  Patient sedated with unchanged limited neurological exam    EBL: < Minimal      Cc            Specimens: Were not Obtained  Contrast:     Visipaque 270 low osmolar 108 Cc             Fluoro: 29.6 min    Findings:  Please see dictated Radiology note for further details  1. Left anterior genu cavernous ica aneurysm measuring 2.06 x 1.49 x 1.4 mm. 2. Left posterior genu cavernous ica aneurysm measuring 2.8 x 2. 8 x 2.2 mm. 3. Right pcom infundibulum. 4. No clear evidence of RCVS or vasospasm. POST-PROCEDURAL EXAM :   Stable neurological Exam  Neurological exam performed and unchanged from initial H&P or consult    Closure:  right V[place] 5   F    POST-PROCEDURAL MONITORING : see orders  Disposition: Neuro ICU      Recommendations:  1. Back to NSICU. 2. Do not bend right leg for 3 hours. 3. Groin checks per protocol. 4. Neuro checks per icu protocol. 5. Peripheral pulse checks per protocol. 6. ICU management per NSICU team.   7. Upon discharge, patient to follow up with Dr Torres Members in 1-2 weeks.     Anahi Lorenzo  Stroke, [facility]roke [address] [facility] Stroke

## 2019-07-29 NOTE — H&P
Cranial Nerves:    cranial nerves II-XII are grossly intact    Motor Exam:    Drift:  absent  Tone:  normal    Motor exam is symmetrical 5 out of 5 all extremities bilaterally    Sensory:    Touch:    Right Upper Extremity:  normal  Left Upper Extremity:  normal  Right Lower Extremity:  normal  Left Lower Extremity:  normal    Plantar Response:  Right:  downgoing  Left:  downgoing    Clonus:  absent    Coordination/Dysmetria:  Finger to Nose:   Right:  abnormal - past pointing and hesitancy  Left:  abnormal - past pointing and hesitancy      Gait:  deferred   SKIN No obvious ecchymosis, rashes, or lesions    NIH Stroke Scale Total (if not done complete detailed one below):    1a.  Level of consciousness:  0 - alert; keenly responsive  1b. Level of consciousness questions:  0 - answers both questions correctly  1c. Level of consciousness questions:  0 - performs both tasks correctly  2. Best Gaze:  0 - normal  3. Visual:  0 - no visual loss  4. Facial Palsy:  0 - normal symmetric movement  5a. Motor left arm:  0 - no drift, limb holds 90 (or 45) degrees for full 10 seconds  5b. Motor right arm:  0 - no drift, limb holds 90 (or 45) degrees for full 10 seconds  6a. Motor left le - no drift; leg holds 30 degree position for full 5 seconds  6b. Motor right le - no drift; leg holds 30 degree position for full 5 seconds  7. Limb Ataxia:  2 - present in two limbs  8. Sensory:  0 - normal; no sensory loss  9. Best Language:  0 - no aphasia, normal  10. Dysarthria:  0 - normal  11.   Extinction and Inattention:  0 - no abnormality    LABS AND IMAGING:     RECENT LABS:  CBC with Differential:    Lab Results   Component Value Date    WBC 9.0 2019    RBC 5.17 2019    HGB 15.3 2019    HCT 46.6 2019     2019    MCV 90.1 2019    MCH 29.6 2019    MCHC 32.8 2019    RDW 13.2 2019    LYMPHOPCT 32 2019    MONOPCT 7 2019

## 2019-07-29 NOTE — SEDATION DOCUMENTATION
Dr Renny Núñez obtaining CSF. LP pressures negligible.   Pt remains awake/ alert, no obvious distress

## 2019-07-29 NOTE — ED TRIAGE NOTES
Pt to ED via [facility] [address] as a transfer from [address] [place] [address],[address] for a SAH. Pt was having a severe headache for a week along with nausea, diarrhea and dizziness. Pt A&O x4.  Pt denies any head trauma leading up to the [facility]. Pt was given Phenergan, Zofran, 1500mg Keppra, and 0.5mg Dilaudid PTA. No other complaints at this time. Pt on full cardiac monitor.

## 2019-07-29 NOTE — SEDATION DOCUMENTATION
Pt remains awake/ alert, moving all extremities. Resp even/ nonlabored.   2mg Versed given per Dr. Remigio Garcia

## 2019-07-29 NOTE — CONSULTS
Every Day Smoker     Packs/day: 1.00     Types: Cigarettes    Smokeless tobacco: Never Used    Tobacco comment: almost off cigarettes, but now vaping. Substance and Sexual Activity    Alcohol use: No     Alcohol/week: 0.0 standard drinks     Comment: denies any heavy use.  Drug use: Yes     Types: Marijuana     Comment: Medical marijuana card verified.  Sexual activity: Not Currently     Partners: Male     Comment: spouse    Lifestyle    Physical activity:     Days per week: Not on file     Minutes per session: Not on file    Stress: Not on file   Relationships    Social connections:     Talks on phone: Not on file     Gets together: Not on file     Attends Christian service: Not on file     Active member of club or organization: Not on file     Attends meetings of clubs or organizations: Not on file     Relationship status: Not on file    Intimate partner violence:     Fear of current or ex partner: Not on file     Emotionally abused: Not on file     Physically abused: Not on file     Forced sexual activity: Not on file   Other Topics Concern    Not on file   Social History Narrative    Not on file       Family History:       Problem Relation Age of Onset    Diabetes Father     Substance Abuse Daughter     Substance Abuse Daughter     Mental Illness Daughter        Allergies:  Penicillins    Home Medications:  Prior to Admission medications    Medication Sig Start Date End Date Taking?  Authorizing Provider   topiramate (TOPAMAX) 200 MG tablet Take 200 mg by mouth 2 times daily   Yes Historical Provider, MD   tiZANidine (ZANAFLEX) 2 MG tablet Take 2 mg by mouth every 8 hours as needed (2 tabs every 8hrs prn)   Yes Historical Provider, MD   cloNIDine (CATAPRES-TTS-2) 0.2 MG/24HR PTWK Place 1 patch onto the skin every 7 days 7/18/19 7/17/20 Yes Lamine Barber MD   methocarbamol (ROBAXIN) 500 MG tablet Take 1 tablet by mouth 3 times daily as needed (muscle spasms) 7/18/19  Yes Lamine Barber MD Service  07/28/19 9:40 PM

## 2019-07-29 NOTE — PROGRESS NOTES
falls or head traumas. Denies any vision changes, tinnitus, difficulty with speech or swallowing, numbness or tingling, paresthesias.     Patient was loaded with Keppra 1.5 g while in the emergency department at [facility] and was also given a migraine cocktail including Benadryl, Phenergan, Decadron. She also received 0.5 mg of Dilaudid prior to transfer for pain. In the emergency department at our facility she received another 1 mg of Dilaudid for pain and 1 mg of Ativan prior to MRI. Pain:  Pain Assessment  Pain Level: Pt reported having back pain, limb weakness during session    Assessment:   Pt presents with mild cognitive deficits characterized by impaired immediate memory and verbal reasoning skills. Pt presents with mild dysarthria characterized by left facial droop and slowed rate of speech. Pt reports slowed rate of speech is normal however also stated medication/pain could be a factor. Pt was very tearful at beginning of evaluation when asked about family and work. Pt was able to return to a calmer state to finish evaluation given support and verbal cues to redirect back to [facility].  ST to follow up and provide treatment to address noted deficits. Education provided. Recommendations:  Requires SLP Intervention: Yes  Duration/Frequency of Treatment: 3-5x/wk  D/C Recommendations: Further therapy recommended at discharge. The patient should be able to tolerate at least three hours of therapy per day over 5 days or 15 hours over 7 days.       Plan:   Goals:  Short-term Goals  Goal 1: Pt will complete OMEX for facial droop 10-20 times per session  Goal 2: Pt will recall 4-5 units without distraction with 90% accuracy  Goal 3: Pt will complete task insight activity with 90% accuracy  Goal 4: Pt will complete deductive reasoning activity with 90% accuracy  Goal 5: Pt will complete verbal sequencing task with 90% accuracy   Patient/family involved in developing goals and treatment plan:

## 2019-07-29 NOTE — FLOWSHEET NOTE
 Spiritual Assessment:   Patient is a 62year old female who is in the hospital because of a stroke. She told me that she has \"bleeding in my brain\". She was very tearful and admitted to being worried and afraid. She told me that she is having a procedure today or tomorrow in the IR lab to try to stop the bleeding. Her  was sitting at her bedside and seemed to be a comfort to her. She has 3 daughters who are on the way to the hospital. One of the daughters is an RN and the patient is happy that she is coming. Patient told me that she prays a lot and has a relationship with God though she does not have a Taoist affiliation.  Intervention:   I had a good conversation with the patient and she seemed glad to see the . I inquired about how she is feeling today and she replied, \"I'm not doing so good\". I asked if she could tell me about that and listened with compassion as she told me about her concerns about the pending procedure. She also talked about her family and indicated that she did not want to see 2 of the daughters who are coming to the hospital. , Gomez Roa, indicated that those daughters will stay in the waiting room and not upset her by coming to her bedside. They want to be close during her procedure. Patient was glad for me to pray for her.  Outcome:   Patient expresses estrangement from 2 of her daughters while the other one (the RN) is \"a good girl\" and welcomes her visit. Patient also expressed that my visit gave her comfort and she thanked me for praying for her.        07/29/19 1022   Encounter Summary   Services provided to: Patient and family together   Place of [address] [place] [address], [facility] No   Continue Visiting   (7/29/19)   Complexity of Encounter Low   Length of Encounter 15 minutes   Spiritual Assessment Completed Yes   Routine   Type Pre-procedure   Assessment Passive; Tearful; Anxious; Coping   Intervention Active listening;Explored feelings,

## 2019-07-29 NOTE — ED PROVIDER NOTES
 Financial resource strain: Not on [employer] insecurity:     Worry: Not on file     Inability: Not on file   [employer] needs:     Medical: Not on file     Non-medical: Not on file   Tobacco Use    Smoking status: Current Every Day Smoker     Packs/day: 1.00     Types: Cigarettes    Smokeless tobacco: Never Used    Tobacco comment: almost off cigarettes, but now vaping. Substance and Sexual Activity    Alcohol use: No     Alcohol/week: 0.0 standard drinks     Comment: denies any heavy use.  Drug use: Yes     Types: Marijuana     Comment: Medical marijuana card verified.  Sexual activity: Not Currently     Partners: Male     Comment: spouse    Lifestyle    Physical activity:     Days per week: Not on file     Minutes per session: Not on file    Stress: Not on file   Relationships    Social connections:     Talks on phone: Not on file     Gets together: Not on file     Attends Shinto service: Not on file     Active member of club or organization: Not on file     Attends meetings of clubs or organizations: Not on file     Relationship status: Not on file    Intimate partner violence:     Fear of current or ex partner: Not on file     Emotionally abused: Not on file     Physically abused: Not on file     Forced sexual activity: Not on file   Other Topics Concern    Not on file   Social History Narrative    Not on file       Family History   Problem Relation Age of Onset    Diabetes Father     Substance Abuse Daughter     Substance Abuse Daughter     Mental Illness Daughter        Allergies:  Penicillins    Home Medications:  Prior to Admission medications    Medication Sig Start Date End Date Taking?  Authorizing Provider   topiramate (TOPAMAX) 200 MG tablet Take 200 mg by mouth 2 times daily   Yes Historical Provider, MD   tiZANidine (ZANAFLEX) 2 MG tablet Take 2 mg by mouth every 8 hours as needed (2 tabs every 8hrs prn)   Yes Historical Provider, MD   cloNIDine (CATAPRES-TTS-2) 0.2 MG/24HR PTWK Place 1 patch onto the skin every 7 days 7/18/19 7/17/20 Yes Lata Garcia MD   methocarbamol (ROBAXIN) 500 MG tablet Take 1 tablet by mouth 3 times daily as needed (muscle spasms) 7/18/19  Yes Lata Garcia MD   omeprazole (PRILOSEC) 40 MG delayed release capsule TAKE 1 CAPSULE DAILY 7/18/19  Yes Lata Garcia MD   simvastatin (ZOCOR) 20 MG tablet Take 1 tablet by mouth nightly 7/18/19  Yes Lata Garcia MD   Lofexidine HCl (LUCEMYRA) 0.18 MG TABS 0.54 mg qid during 1st week after last opioid use, then reduce by 0.18 mg per dose every 1 to 2 days. May continue for up to 14 days. 7/18/19  Yes Lata Garcia MD   bisoprolol (ZEBETA) 5 MG tablet Take 0.5 tablets by mouth daily 7/18/19  Yes Lata Garcia MD   docusate sodium (COLACE) 100 MG capsule Take 1 capsule by mouth 2 times daily as needed for Constipation 3/19/19  Yes Lata Garcia MD   morphine (MSIR) 15 MG tablet Take 15 mg by mouth every 4 hours as needed for Pain. Yes Historical Provider, MD   medical marijuana Take by mouth as needed.    Yes Historical Provider, MD   traZODone (DESYREL) 100 MG tablet TAKE 2 TABLETS NIGHTLY 3/5/19  Yes Lata Garcia MD   sertraline (ZOLOFT) 100 MG tablet TAKE 1 TABLET DAILY 1/2/19  Yes Lata Garcia MD   sertraline (ZOLOFT) 50 MG tablet Take 1 tablet by mouth daily 1/2/19  Yes Lata Garcia MD   dicyclomine (BENTYL) 10 MG capsule TAKE 1 CAPSULE THREE TIMES A DAY AS NEEDED FOR ABDOMINAL PAIN, TAKE WITH THE 20 MG DOSE 12/5/18  Yes Lata Garcia MD   dicyclomine (BENTYL) 20 MG tablet TAKE 1 TABLET THREE TIMES A DAY AS NEEDED FOR ABDOMINAL PAIN, TAKE WITH THE 10 MG DOSE 12/5/18  Yes Lata Garcia MD   ondansetron (ZOFRAN) 8 MG tablet Take 1 tablet by mouth every 8 hours as needed for Nausea or Vomiting 10/11/18  Yes Lata Garcia MD   oxyCODONE (ROXICODONE) 5 MG immediate release tablet  1/25/18  Yes Juan Jose Lira MD   Cholecalciferol (VITAMIN D3) 5000 units TABS Take 1 tablet by mouth daily (with

## 2019-07-29 NOTE — PROGRESS NOTES
Daily Progress Note  Neuro Critical Care    Patient Name: Samaria Wood  Patient : 1960  Room/Bed: [facility]0/3921-05  Allergies: Allergies   Allergen Reactions    Penicillins Rash     Problem List:   Patient Active Problem List   Diagnosis    Tobacco use disorder, continuous    Generalized anxiety disorder    Chronic bilateral low back pain with bilateral sciatica    Insomnia    Adenomatous polyp    Chronic prescription opiate use    Essential hypertension    Mixed hyperlipidemia    SAH (subarachnoid hemorrhage) (HCC)       INTERVAL HISTORY    Initial Presentation   60-year-old female with medical history of generalized anxiety disorder, depression, migraine headaches who presented with sudden onset posterior headache that started few hours prior to presentation. Was worsening. Reports that this headache is different from migraine as it is worse and associated with neck stiffness. She also reports photophobia. Patient has been on oral morphine and Percocet at home due to multiple back surgeries. In [facility] she was loaded with Keppra 1.5 mg in [facility] and hospital ED. Received migraine cocktail and 0.5 mg Dilaudid prior to transfer and another 1 milligram of Dilaudid after transfer. CT head showed area of hyperdensity in the high parietal area on the large side near the vertex suspicious for subarachnoid hemorrhage. Of notes, patient had 3 episodes of syncope this week. History of diarrhea. Uses a cane at home for dizziness. HR in [facility] heart rate was 44. Hospital Course:   After transfer, she had an MRI and MRA of the head confirmed the area of subarachnoid and ruled out any AVMs, stenosis or occlusion. Repeat CT head this morning showed stable SAH. She went for cerebral angiogram and lumber puncture for diagnostic studies was done.        MEDICATIONS:     CURRENT MEDICATIONS:  SCHEDULED MEDICATIONS:   insulin lispro  0-6 Units Subcutaneous TID WC    insulin Lipase 81 (H) 13 - 60 U/L   Troponin    Collection Time: 07/28/19  5:05 PM   Result Value Ref Range    Troponin, High Sensitivity 9 0 - 14 ng/L    Troponin T NOT REPORTED <0.03 ng/mL    Troponin Interp NOT REPORTED    Magnesium    Collection Time: 07/28/19  5:05 PM   Result Value Ref Range    Magnesium 2.2 1.6 - 2.6 mg/dL   TSH with Reflex    Collection Time: 07/28/19  5:05 PM   Result Value Ref Range    TSH 0.98 0.30 - 5.00 mIU/L   Urinalysis Reflex to Culture    Collection Time: 07/28/19  6:16 PM   Result Value Ref Range    Color, UA YELLOW YELLOW    Turbidity UA CLEAR CLEAR    Glucose, Ur NEGATIVE NEGATIVE    Bilirubin Urine NEGATIVE NEGATIVE    Ketones, Urine NEGATIVE NEGATIVE    Specific Gravity, UA 1.006 1.005 - 1.030    Urine Hgb TRACE (A) NEGATIVE    pH, UA 6.0 5.0 - 8.0    Protein, UA NEGATIVE NEGATIVE    Urobilinogen, Urine Normal Normal    Nitrite, Urine NEGATIVE NEGATIVE    Leukocyte Esterase, Urine NEGATIVE NEGATIVE    Urinalysis Comments NOT REPORTED    Microscopic Urinalysis    Collection Time: 07/28/19  6:16 PM   Result Value Ref Range    -          WBC, UA 0 TO 2 0 - 5 /HPF    RBC, UA 0 TO 2 0 - 2 /HPF    Casts UA NOT REPORTED /LPF    Crystals UA NOT REPORTED None /HPF    Epithelial Cells UA 2 TO 5 0 - 5 /HPF    Renal Epithelial, Urine NOT REPORTED 0 /HPF    Bacteria, UA RARE (A) None    Mucus, UA NOT REPORTED None    Trichomonas, UA NOT REPORTED None    Amorphous, UA NOT REPORTED None    Other Observations UA NOT REPORTED NOT REQ.     Yeast, UA NOT REPORTED None   CBC    Collection Time: 07/29/19  3:56 AM   Result Value Ref Range    WBC 9.1 3.5 - 11.3 k/uL    RBC 4.65 3.95 - 5.11 m/uL    Hemoglobin 13.8 11.9 - 15.1 g/dL    Hematocrit 43.2 36.3 - 47.1 %    MCV 92.9 82.6 - 102.9 fL    MCH 29.7 25.2 - 33.5 pg    MCHC 31.9 28.4 - 34.8 g/dL    RDW 13.1 11.8 - 14.4 %    Platelets 499 018 - 563 k/uL    MPV 11.0 8.1 - 13.5 fL    NRBC Automated 0.0 0.0 per 100 WBC   Basic Metabolic Panel w/ Reflex to MG Collection Time: 07/29/19  3:56 AM   Result Value Ref Range    Glucose 239 (H) 70 - 99 mg/dL    BUN 15 6 - 20 mg/dL    CREATININE 0.78 0.50 - 0.90 mg/dL    Bun/Cre Ratio NOT REPORTED 9 - 20    Calcium 8.5 (L) 8.6 - 10.4 mg/dL    Sodium 139 135 - 144 mmol/L    Potassium 3.8 3.7 - 5.3 mmol/L    Chloride 109 (H) 98 - 107 mmol/L    CO2 19 (L) 20 - 31 mmol/L    Anion Gap 11 9 - 17 mmol/L    GFR Non-African American >60 >60 mL/min    GFR African American >60 >60 mL/min    GFR Comment          GFR Staging NOT REPORTED    LIPASE    Collection Time: 07/29/19  3:56 AM   Result Value Ref Range    Lipase 63 (H) 13 - 60 U/L   HEPATIC FUNCTION PANEL    Collection Time: 07/29/19  3:56 AM   Result Value Ref Range    Alb 3.5 3.5 - 5.2 g/dL    Alkaline Phosphatase 128 (H) 35 - 104 U/L    ALT 9 5 - 33 U/L    AST 12 <32 U/L    Total Bilirubin <0.10 (L) 0.3 - 1.2 mg/dL    Bilirubin, Direct <0.08 <0.31 mg/dL    Bilirubin, Indirect CANNOT BE CALCULATED 0.00 - 1.00 mg/dL    Total Protein 6.2 (L) 6.4 - 8.3 g/dL    Globulin NOT REPORTED 1.5 - 3.8 g/dL    Albumin/Globulin Ratio 1.3 1.0 - 2.5           RADIOLOGY   Xr Chest Standard (2 Vw)    Result Date: 7/28/2019  EXAMINATION: TWO XRAY VIEWS OF THE CHEST 7/28/2019 6:47 pm COMPARISON: None. HISTORY: ORDERING SYSTEM PROVIDED HISTORY: syncope TECHNOLOGIST PROVIDED HISTORY: syncope Reason for Exam: Pt states fatigue and weakness today Acuity: Acute Type of Exam: Initial FINDINGS: Heart size is upper limits of normal and the lungs are clear. No pneumothorax or pleural fluid. No acute bone finding. No acute cardiopulmonary disease.      Ct Head Wo Contrast    Result Date: 7/29/2019  EXAMINATION: CT OF THE HEAD WITHOUT CONTRAST  7/29/2019 4:25 am TECHNIQUE: CT of the head was performed without the administration of intravenous contrast. Dose modulation, iterative reconstruction, and/or weight based adjustment of the mA/kV was utilized to reduce the radiation dose to as low as reasonably Trace acute subarachnoid hemorrhage in the high parietal are on the right near the vertex  Repeat CT head this morning: Stable subarachnoid hemorrhage   Started on Keppra 500 mg twice daily  Topiramate 200 mg twice daily  Chronic pain on Percocet 5 to 10 mg every 4 hours as needed  Trazodone 200 mg at bedtime as needed for sleep  Psychiatry consult was requested for suicidal ideation  Hemophilia panel was requested to rule out coagulopathy/bleeding tendency   Depression, MICHELE on 100 mg of sertraline daily  LP today to assess for meningitis with ICP measurement  If angiogram is negative and LP is negative, will get MRI with IV contrast    Cardio   Recurrent syncope  Sinus bradycardia  EKG: Sinus bradycardia. T wave abnormality consider anterior ischemia  Echocardiogram requested  High-sensitivity troponin: 9  Blood pressure: Hypotensive this morning 91/28 MAP 47  Heart rate:  bradycardia this morning 47  Hypertension. Home medicines include clonidine patch 0.2 daily  Bisoprolo 2.5 mg daily  Currently on metoprolol 12.5 mg p.o. twice daily        Respiratory  Chest x-ray: Normal  Maintaining saturation room air    Endo  Last hemoglobin A1c on March 2019 6.2  Elevated random glucose 236  On insulin sliding scale  Follow-up hemoglobin A1c    ID/Heme  T-max 98.4    GI  CT abdomen and pelvis: Diverticulosis without diverticulitis. No other acute findings.   On protonix 40 mg qd    LDAs:   2 peripheral IVs, left anterior hand, left anterior forearm    DVT PPx: SCDs    Code: Full    Layla Chan MD  Neurology Resident  Neuro Critical Care   Phone: [email or web address]

## 2019-07-29 NOTE — SEDATION DOCUMENTATION
Pt given 1 mg Versed and 25 mcg Fentanyl per Dr. Miguel Nurse. Pt prepped and ready for LP.   Pt in supine position in no obvious distress

## 2019-07-29 NOTE — CONSULTS
CAPSULE DAILY 7/18/19  Yes Rey Garcia MD   simvastatin (ZOCOR) 20 MG tablet Take 1 tablet by mouth nightly 7/18/19  Yes Rey Garcia MD   Lofexidine HCl (LUCEMYRA) 0.18 MG TABS 0.54 mg qid during 1st week after last opioid use, then reduce by 0.18 mg per dose every 1 to 2 days. May continue for up to 14 days. 7/18/19  Yes Rey Garcia MD   bisoprolol (ZEBETA) 5 MG tablet Take 0.5 tablets by mouth daily 7/18/19  Yes Rey Garcia MD   docusate sodium (COLACE) 100 MG capsule Take 1 capsule by mouth 2 times daily as needed for Constipation 3/19/19  Yes Rey Garcia MD   morphine (MSIR) 15 MG tablet Take 15 mg by mouth every 4 hours as needed for Pain. Yes Historical Provider, MD   medical marijuana Take by mouth as needed.    Yes Historical Provider, MD   traZODone (DESYREL) 100 MG tablet TAKE 2 TABLETS NIGHTLY 3/5/19  Yes Rey Garcia MD   sertraline (ZOLOFT) 100 MG tablet TAKE 1 TABLET DAILY 1/2/19  Yes Rey Garcia MD   sertraline (ZOLOFT) 50 MG tablet Take 1 tablet by mouth daily 1/2/19  Yes Rey Garcia MD   dicyclomine (BENTYL) 10 MG capsule TAKE 1 CAPSULE THREE TIMES A DAY AS NEEDED FOR ABDOMINAL PAIN, TAKE WITH THE 20 MG DOSE 12/5/18  Yes Rey Garcia MD   dicyclomine (BENTYL) 20 MG tablet TAKE 1 TABLET THREE TIMES A DAY AS NEEDED FOR ABDOMINAL PAIN, TAKE WITH THE 10 MG DOSE 12/5/18  Yes Rey Garcia MD   ondansetron (ZOFRAN) 8 MG tablet Take 1 tablet by mouth every 8 hours as needed for Nausea or Vomiting 10/11/18  Yes Rey Garcia MD   oxyCODONE (ROXICODONE) 5 MG immediate release tablet  1/25/18  Yes Wendie Waldrop MD   Cholecalciferol (VITAMIN D3) 5000 units TABS Take 1 tablet by mouth daily (with breakfast) 2/19/18  Yes Eliot Arguelles,     Scheduled Meds:   insulin lispro  0-6 Units Subcutaneous TID WC    insulin lispro  0-3 Units Subcutaneous Nightly    metoprolol tartrate  12.5 mg Oral BID    vitamin D  5,000 Units Oral Daily with breakfast    dicyclomine  10 mg Oral TID

## 2019-07-30 VITALS
DIASTOLIC BLOOD PRESSURE: 69 MMHG | HEART RATE: 78 BPM | TEMPERATURE: 99 F | SYSTOLIC BLOOD PRESSURE: 143 MMHG | OXYGEN SATURATION: 97 % | WEIGHT: 188.49 LBS | RESPIRATION RATE: 17 BRPM | BODY MASS INDEX: 29.58 KG/M2 | HEIGHT: 67 IN

## 2019-07-30 LAB
ABSOLUTE EOS #: 0.09 K/UL (ref 0–0.44)
ABSOLUTE IMMATURE GRANULOCYTE: 0.04 K/UL (ref 0–0.3)
ABSOLUTE LYMPH #: 1.29 K/UL (ref 1.1–3.7)
ABSOLUTE MONO #: 0.66 K/UL (ref 0.1–1.2)
ANION GAP SERPL CALCULATED.3IONS-SCNC: 13 MMOL/L (ref 9–17)
BASOPHILS # BLD: 1 % (ref 0–2)
BASOPHILS ABSOLUTE: 0.04 K/UL (ref 0–0.2)
BUN BLDV-MCNC: 8 MG/DL (ref 6–20)
BUN/CREAT BLD: ABNORMAL (ref 9–20)
CALCIUM IONIZED: 1.16 MMOL/L (ref 1.13–1.33)
CALCIUM SERPL-MCNC: 8 MG/DL (ref 8.6–10.4)
CHLORIDE BLD-SCNC: 110 MMOL/L (ref 98–107)
CO2: 20 MMOL/L (ref 20–31)
CREAT SERPL-MCNC: 0.61 MG/DL (ref 0.5–0.9)
DIFFERENTIAL TYPE: ABNORMAL
EOSINOPHILS RELATIVE PERCENT: 1 % (ref 1–4)
FACTOR II ACTIVITY: 117 % (ref 50–150)
FACTOR IX ACTIVITY: 120 % (ref 50–150)
FACTOR V ACTIVITY: 97 % (ref 50–150)
FACTOR VII ACTIVITY: 127 % (ref 50–150)
FACTOR VIII ACTIVITY: 170 % (ref 50–150)
FACTOR X ACTIVITY: 121 % (ref 50–150)
FACTOR XI ACTIVITY: 120 % (ref 50–150)
FACTOR XII ACTIVITY: 119 % (ref 35–150)
FACTOR XIII ACTIVITY: 83 % (ref 69–143)
GFR AFRICAN AMERICAN: >60 ML/MIN
GFR NON-AFRICAN AMERICAN: >60 ML/MIN
GFR SERPL CREATININE-BSD FRML MDRD: ABNORMAL ML/MIN/{1.73_M2}
GFR SERPL CREATININE-BSD FRML MDRD: ABNORMAL ML/MIN/{1.73_M2}
GLUCOSE BLD-MCNC: 103 MG/DL (ref 70–99)
GLUCOSE BLD-MCNC: 108 MG/DL (ref 65–105)
GLUCOSE BLD-MCNC: 124 MG/DL (ref 65–105)
GLUCOSE BLD-MCNC: 137 MG/DL (ref 65–105)
HCT VFR BLD CALC: 41.7 % (ref 36.3–47.1)
HEMOGLOBIN: 13.1 G/DL (ref 11.9–15.1)
IMMATURE GRANULOCYTES: 1 %
LYMPHOCYTES # BLD: 16 % (ref 24–43)
MAGNESIUM: 2.6 MG/DL (ref 1.6–2.6)
MCH RBC QN AUTO: 29.8 PG (ref 25.2–33.5)
MCHC RBC AUTO-ENTMCNC: 31.4 G/DL (ref 28.4–34.8)
MCV RBC AUTO: 94.8 FL (ref 82.6–102.9)
MONOCYTES # BLD: 8 % (ref 3–12)
NRBC AUTOMATED: 0 PER 100 WBC
PDW BLD-RTO: 13.2 % (ref 11.8–14.4)
PLATELET # BLD: 175 K/UL (ref 138–453)
PLATELET ESTIMATE: ABNORMAL
PMV BLD AUTO: 11.2 FL (ref 8.1–13.5)
POTASSIUM SERPL-SCNC: 3.4 MMOL/L (ref 3.7–5.3)
PROTEIN C ACTIVITY: 113 %
PROTEIN S ACTIVITY: 99 % (ref 59–130)
PROTEIN S ANTIGEN, FREE: 102 % (ref 55–123)
PROTEIN S ANTIGEN, TOTAL: 135 % (ref 63–126)
RBC # BLD: 4.4 M/UL (ref 3.95–5.11)
RBC # BLD: ABNORMAL 10*6/UL
RISTOCETIN CO-FACTOR: 109 % (ref 51–215)
SEG NEUTROPHILS: 73 % (ref 36–65)
SEGMENTED NEUTROPHILS ABSOLUTE COUNT: 6.08 K/UL (ref 1.5–8.1)
SODIUM BLD-SCNC: 143 MMOL/L (ref 135–144)
WBC # BLD: 8.2 K/UL (ref 3.5–11.3)
WBC # BLD: ABNORMAL 10*3/UL

## 2019-07-30 PROCEDURE — 85025 COMPLETE CBC W/AUTO DIFF WBC: CPT

## 2019-07-30 PROCEDURE — 6360000002 HC RX W HCPCS: Performed by: EMERGENCY MEDICINE

## 2019-07-30 PROCEDURE — 6360000002 HC RX W HCPCS: Performed by: STUDENT IN AN ORGANIZED HEALTH CARE EDUCATION/TRAINING PROGRAM

## 2019-07-30 PROCEDURE — 6370000000 HC RX 637 (ALT 250 FOR IP): Performed by: EMERGENCY MEDICINE

## 2019-07-30 PROCEDURE — 99233 SBSQ HOSP IP/OBS HIGH 50: CPT | Performed by: PSYCHIATRY & NEUROLOGY

## 2019-07-30 PROCEDURE — 99232 SBSQ HOSP IP/OBS MODERATE 35: CPT | Performed by: NURSE PRACTITIONER

## 2019-07-30 PROCEDURE — 36415 COLL VENOUS BLD VENIPUNCTURE: CPT

## 2019-07-30 PROCEDURE — 83735 ASSAY OF MAGNESIUM: CPT

## 2019-07-30 PROCEDURE — 99238 HOSP IP/OBS DSCHRG MGMT 30/<: CPT | Performed by: PSYCHIATRY & NEUROLOGY

## 2019-07-30 PROCEDURE — 2580000003 HC RX 258: Performed by: EMERGENCY MEDICINE

## 2019-07-30 PROCEDURE — 2580000003 HC RX 258: Performed by: STUDENT IN AN ORGANIZED HEALTH CARE EDUCATION/TRAINING PROGRAM

## 2019-07-30 PROCEDURE — 6370000000 HC RX 637 (ALT 250 FOR IP): Performed by: PSYCHIATRY & NEUROLOGY

## 2019-07-30 PROCEDURE — 82330 ASSAY OF CALCIUM: CPT

## 2019-07-30 PROCEDURE — 6370000000 HC RX 637 (ALT 250 FOR IP): Performed by: STUDENT IN AN ORGANIZED HEALTH CARE EDUCATION/TRAINING PROGRAM

## 2019-07-30 PROCEDURE — 80048 BASIC METABOLIC PNL TOTAL CA: CPT

## 2019-07-30 RX ORDER — ACETAMINOPHEN 500 MG
1000 TABLET ORAL EVERY 6 HOURS PRN
Qty: 120 TABLET | Refills: 3
Start: 2019-07-30 | End: 2020-10-13 | Stop reason: ALTCHOICE

## 2019-07-30 RX ORDER — HYDROXYZINE HYDROCHLORIDE 25 MG/1
25 TABLET, FILM COATED ORAL 3 TIMES DAILY PRN
Status: DISCONTINUED | OUTPATIENT
Start: 2019-07-30 | End: 2019-07-30 | Stop reason: HOSPADM

## 2019-07-30 RX ORDER — SERTRALINE HYDROCHLORIDE 100 MG/1
150 TABLET, FILM COATED ORAL DAILY
Qty: 60 TABLET | Refills: 0 | Status: SHIPPED | OUTPATIENT
Start: 2019-07-30 | End: 2020-10-08 | Stop reason: SDUPTHER

## 2019-07-30 RX ORDER — GABAPENTIN 100 MG/1
100 CAPSULE ORAL 3 TIMES DAILY
Status: DISCONTINUED | OUTPATIENT
Start: 2019-07-30 | End: 2019-07-30 | Stop reason: HOSPADM

## 2019-07-30 RX ORDER — POTASSIUM CHLORIDE 20 MEQ/1
40 TABLET, EXTENDED RELEASE ORAL PRN
Status: DISCONTINUED | OUTPATIENT
Start: 2019-07-30 | End: 2019-07-30 | Stop reason: HOSPADM

## 2019-07-30 RX ORDER — CLONIDINE 0.2 MG/24H
1 PATCH, EXTENDED RELEASE TRANSDERMAL WEEKLY
Status: DISCONTINUED | OUTPATIENT
Start: 2019-07-30 | End: 2019-07-30 | Stop reason: HOSPADM

## 2019-07-30 RX ORDER — HYDROXYZINE HYDROCHLORIDE 25 MG/1
25 TABLET, FILM COATED ORAL 3 TIMES DAILY PRN
Qty: 60 TABLET | Refills: 0 | Status: SHIPPED | OUTPATIENT
Start: 2019-07-30 | End: 2019-08-09

## 2019-07-30 RX ORDER — POTASSIUM CHLORIDE 7.45 MG/ML
10 INJECTION INTRAVENOUS PRN
Status: DISCONTINUED | OUTPATIENT
Start: 2019-07-30 | End: 2019-07-30 | Stop reason: HOSPADM

## 2019-07-30 RX ORDER — GABAPENTIN 100 MG/1
100 CAPSULE ORAL 3 TIMES DAILY
Qty: 90 CAPSULE | Refills: 0 | Status: SHIPPED | OUTPATIENT
Start: 2019-07-30 | End: 2019-08-18 | Stop reason: SDUPTHER

## 2019-07-30 RX ADMIN — GABAPENTIN 100 MG: 100 CAPSULE ORAL at 12:12

## 2019-07-30 RX ADMIN — POTASSIUM CHLORIDE 40 MEQ: 20 TABLET, EXTENDED RELEASE ORAL at 08:05

## 2019-07-30 RX ADMIN — METHOCARBAMOL TABLETS 500 MG: 500 TABLET, COATED ORAL at 08:56

## 2019-07-30 RX ADMIN — MAGNESIUM SULFATE HEPTAHYDRATE 2 G: 500 INJECTION, SOLUTION INTRAMUSCULAR; INTRAVENOUS at 06:29

## 2019-07-30 RX ADMIN — SERTRALINE 150 MG: 50 TABLET, FILM COATED ORAL at 08:06

## 2019-07-30 RX ADMIN — Medication 10 ML: at 08:06

## 2019-07-30 RX ADMIN — METOPROLOL TARTRATE 12.5 MG: 25 TABLET ORAL at 12:11

## 2019-07-30 RX ADMIN — OXYCODONE HYDROCHLORIDE 5 MG: 5 TABLET ORAL at 10:55

## 2019-07-30 RX ADMIN — MORPHINE SULFATE 4 MG: 4 INJECTION INTRAVENOUS at 02:31

## 2019-07-30 RX ADMIN — VITAMIN D, TAB 1000IU (100/BT) 5000 UNITS: 25 TAB at 08:05

## 2019-07-30 RX ADMIN — LEVETIRACETAM 500 MG: 5 INJECTION INTRAVENOUS at 08:05

## 2019-07-30 RX ADMIN — METOCLOPRAMIDE 10 MG: 5 INJECTION, SOLUTION INTRAMUSCULAR; INTRAVENOUS at 06:29

## 2019-07-30 RX ADMIN — HYDROXYZINE HYDROCHLORIDE 50 MG: 50 INJECTION, SOLUTION INTRAMUSCULAR at 08:59

## 2019-07-30 RX ADMIN — PANTOPRAZOLE SODIUM 40 MG: 40 TABLET, DELAYED RELEASE ORAL at 08:06

## 2019-07-30 RX ADMIN — TOPIRAMATE 200 MG: 200 TABLET, FILM COATED ORAL at 08:06

## 2019-07-30 ASSESSMENT — PAIN SCALES - GENERAL
PAINLEVEL_OUTOF10: 7
PAINLEVEL_OUTOF10: 7
PAINLEVEL_OUTOF10: 0

## 2019-07-30 ASSESSMENT — PAIN DESCRIPTION - LOCATION: LOCATION: BACK

## 2019-07-30 ASSESSMENT — PAIN DESCRIPTION - PAIN TYPE: TYPE: CHRONIC PAIN;SURGICAL PAIN

## 2019-07-30 ASSESSMENT — PAIN DESCRIPTION - DESCRIPTORS: DESCRIPTORS: CONSTANT;ACHING;SHARP

## 2019-07-30 ASSESSMENT — PAIN DESCRIPTION - ONSET: ONSET: PROGRESSIVE

## 2019-07-30 ASSESSMENT — PAIN DESCRIPTION - ORIENTATION: ORIENTATION: LOWER

## 2019-07-30 ASSESSMENT — PAIN DESCRIPTION - FREQUENCY: FREQUENCY: CONTINUOUS

## 2019-07-30 ASSESSMENT — PAIN DESCRIPTION - PROGRESSION: CLINICAL_PROGRESSION: NOT CHANGED

## 2019-07-30 NOTE — PLAN OF CARE
Patient states understanding of pain scale and interventions. Pain assessed with hourly rounding and PRN. Patient states pain level is not well controlled with meds as ordered. No falls to date. Bed in lowest position with call light within reach. Floor free from obstacles and patient able to call out with needs or assistance with ambulation. Fall risk score evaluated, as charted. Bed alarm not applicable at this time. Will continue to monitor for additional needs. Pt remains free from falls and accidental injury at this time. Fall precautions in place, bed alarm activated. Floor free from obstacles. Pt encouraged to call before getting out of bed for any needs, verbalizes understanding. Using call light appropriately. Will continue to monitor. Suicide precautions in place Patient assessed for fall risk; fall precautions initiated. Patient and family instructed about safety devices. Safe environment maintained.  at bedside. Will continue to closely monitor.

## 2019-07-30 NOTE — DISCHARGE SUMMARY
emergency department at [facility] and was also given a migraine cocktail including Benadryl, Phenergan, Decadron. She also received 0.5 mg of Dilaudid prior to transfer for pain. In the emergency department at our facility she received another 1 mg of Dilaudid for pain and 1 mg of Ativan prior to MRI. H&H 1, Modified Crawford 1. MRI Brain showed no acute infarct, demonstrated trace acute SAH in the high right parietal region. Repeat CT Head 7/29 re-demonstrated possible SAH within high right parietal lobe. Diagnostic angio revealed left anterior genu cavernous ICA aneurysm and left posterior genu cavernous aneurysm, right PCOM infundibulum, no clear evidence of RCVS or aneurysm. Aneurysm thought to be incidental and not related to small SAH. Patient is to follow up with Neuro Endovascular for elective treatment. LP performed; Protein 58.3, Glucose 79, RBC 1, WBC 1. Meningitis panel negative. Subarachnoid hemorrhage is nonaneurysmal and spontaneous, no clear etiology. On the day of admission, patient's headache has improved significantly. No focal neurological deficits on clinical exam.    Psych evaluation performed 7/29 due to history of depression, anxiety, and suicidal ideations. Zoloft increased to 150mg QD per recommendations and Vistaril added PRN for anxiety. Gabapentin 100mg TID started for pain control. It was explained to the patient that this is a very low dose and should be up-titrated outpatient as she tolerates. She understands this is a medications that takes time to work and should be taken consistently and not as needed. Patient understands she will not be continued on narcotic/opiate pain medications and states she does not want to restart this type of medication. Psych re-evaluated patient on day of discharge and found her to be stable to discharge home.   It was reiterated to patient and family that she should see a Psychiatrist outpatient as soon as possible to continue with

## 2019-07-30 NOTE — PROGRESS NOTES
[facility]  Speech Language Pathology    Date: 7/30/2019  Patient Name: Gustavo Schmidt  YOB: 1960   AGE: 62 y.o. MRN: [id number]        Patient Not Available for Speech Therapy     Due to:  [] Testing  [] Hemodialysis  [] Cancelled by RN  [] Surgery   [] Intubation/Sedation/Pain Medication  [] Medical instability  [x] Other:Attempted in AM. Pt refused d/t wanting to sleep.  ST to continue to follow    Next scheduled treatment: 7/31/19  Completed by: Manjeet Alcantara,  Clinician    Co-signed by   Trinity Guardado M.S., CCC/SLP

## 2019-07-30 NOTE — PROGRESS NOTES
Occupational Therapy    Occupational Therapy Not Seen Note    DATE: 2019  Name: Juan Oakes  : 1960  MRN: [id number]    Patient not available for Occupational Therapy due to: Other: Pt has been agitated, RN gave med's recently and now pt is sleeping soundly, hold OT eval.    Next Scheduled Treatment: Attempt this pm as time allows or  as appropriate.     Electronically signed by Jefry Schulte OT on 2019 at 11:37 AM

## 2019-07-30 NOTE — PROGRESS NOTES
Physical Therapy  DATE: 2019    NAME: Mojgan Mckinley  MRN: [id number]   : 1960    Patient not seen this date for Physical Therapy due to:  [] Blood transfusion in progress  [] Hemodialysis  []  Patient Declined  [] Spine Precautions   [] Strict Bedrest  [] Surgery/ Procedure  [] Testing      [x] Other Agitated. RN requested check PM. PM: Pt getting psych evaluation. [] PT being discontinued at this time. Patient independent. No further needs. [] PT being discontinued at this time as the patient has been transferred to palliative care. No further needs.     Minus Curoh, PT

## 2019-07-30 NOTE — CARE COORDINATION
[address] Transitions Interview     2019    Patient: Jone Joshi Patient : 1960   MRN: [id number]  Reason for Admission: [facility]  RARS: Readmission Risk Score: 12         Attempted to meet with patient in room. She has been complaining of back and head pain and was recently medicated, sleeping soundly. No discharge plan in place as of yet. SW has seen patient d/t suicidal ideation. Will continue to monitor for further discharge planning and come see patient at a more appropriate time.        Readmission Risk  Patient Active Problem List   Diagnosis    Tobacco use disorder, continuous    Generalized anxiety disorder    Chronic bilateral low back pain with bilateral sciatica    Insomnia    Adenomatous polyp    Chronic prescription opiate use    Essential hypertension    Mixed hyperlipidemia    Subarachnoid hemorrhage (HCC)    Severe episode of recurrent major depressive disorder, without psychotic features ([facility] [address]    Anxiety       Inpatient Assessment  Care Transitions Summary    Care Transitions Inpatient Review  Medication Review  Housing Review  Social Support  Durable Medical Equipment  Functional Review  Hearing and Vision  Care Transitions Interventions         Follow Up  Future Appointments   Date Time Provider Jonathan Bravo   2019  2:30 PM Juliana Schaeffer MD [facility] PAIN  [facility]   2019  4:00 PM MD Edenilson Alcantar[address] Maintenance  Health Maintenance Due   Topic Date Due    Breast cancer screen  10/06/2010       Edith Thurston RN

## 2019-07-30 NOTE — PROGRESS NOTES
ENDOVASCULAR NEUROSURGERY PROGRESS NOTE  7/30/2019 8:21 AM  Subjective:   Admit Date: 7/28/2019  PCP: Darion Dave MD    She is doing okay this morning, still complaining of headache. Also has back pain. Objective:   Vitals: /68   Pulse 70   Temp 99.6 °F (37.6 °C) (Oral)   Resp 18   Ht 5' 7\" (1.702 m)   Wt 188 lb 7.9 oz (85.5 kg)   SpO2 97%   BMI 29.52 kg/m²   General appearance: She is on bed, appears comfortable. HEENT: Atraumatic. Neck: Neck is supple. Lungs: No respiratory distress noted. Heart: normal S1, S2.   Abdomen: Soft nontender. Extremities: No lower limb edema noted. Neurologic: SHe is awake, following simple commands appropriately, able to name simple objects, intact comprehension, no dysarthria noted. CN: SHe has intact extraocular muscles movements, no facial droop noted, intact sensation on the face on trigeminal distribution bilaterally, palate is symmetric, tongue is midline upon protrusion. MOTOR: SHe has good strength in both upper and lower extremities, moving both upper and lower extremities against gravity with no drift. SENSORY: Intact sensation to simple touch bilaterally in both upper and lower extremities. Groin looks clean with no hematoma noted.     Medications and labs:   Scheduled Meds:   insulin lispro  0-6 Units Subcutaneous TID WC    insulin lispro  0-3 Units Subcutaneous Nightly    magnesium sulfate  2 g Intravenous Q8H    And    metoclopramide  10 mg Intravenous Q8H    sertraline  150 mg Oral Daily    lidocaine  1 patch Transdermal Daily    traZODone  200 mg Oral Nightly    [Held by provider] metoprolol tartrate  12.5 mg Oral BID    vitamin D  5,000 Units Oral Daily with breakfast    pantoprazole  40 mg Oral QAM AC    simvastatin  20 mg Oral Nightly    topiramate  200 mg Oral BID    sodium chloride flush  10 mL Intravenous 2 times per day    levetiracetam  500 mg Intravenous Q12H     Continuous Infusions:   dextrose      sodium chloride 125 mL/hr at 07/29/19 0000     CBC:   Recent Labs     07/28/19  1705 07/29/19  0356   WBC 9.0 9.1   HGB 15.3* 13.8    175     BMP:    Recent Labs     07/28/19  1705 07/29/19  0356 07/30/19  0400    139 143   K 4.4 3.8 3.4*    109* 110*   CO2 22 19* 20   BUN 15 15 8   CREATININE 0.82 0.78 0.61   GLUCOSE 108* 239* 103*     Hepatic:   Recent Labs     07/28/19  1705 07/29/19  0356   AST 17 12   ALT 11 9   BILITOT 0.22* <0.10*   ALKPHOS 148* 128*     Troponin: No results for input(s): TROPONINI in the last 72 hours. BNP: No results for input(s): BNP in the last 72 hours. Lipids: No results for input(s): CHOL, HDL in the last 72 hours. Invalid input(s): LDLCALCU  INR: No results for input(s): INR in the last 72 hours. Assessment and Recommendations:   71-year-old female with history of migraine and anxiety presenting with worsening headache with CT head showing right frontal/parietal convexity subarachnoid hemorrhage. Differential diagnosis include reversible cerebral vasoconstriction syndrome, cortical venous sinus thrombosis, cerebral amyloid angiopathy, less likely traumatic subarachnoid hemorrhage. Status post diagnostic cerebral angiogram on July 29, 2019 which revealed    Now status post diagnostic cerebral angiogram on July 29, 2019 which revealed:  Impression:    1. There is a left-sided anterior genu cavernous ICA aneurysm noted measuring 2.06 x 1.49 x 1.4 mm. Also there is a left-sided posterior genu cavernous ICA aneurysm measuring 2.8 x 2.8 x 2.2 mm.   2. 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 3. There is no clear evidence of vasospasm or RCVS.     She underwent lumbar puncture which revealed protein at 58.3, red cells at 1, no xanthochromia noted.     Discussed with the patient treatment options for current left ICA aneurysm which is likely incidental and not related to the subarachnoid hemorrhage, options of treatment include

## 2019-07-31 ENCOUNTER — CARE COORDINATION (OUTPATIENT)
Dept: CASE MANAGEMENT | Age: 59
End: 2019-07-31

## 2019-07-31 LAB — PROTEIN C ANTIGEN: 132 % (ref 63–153)

## 2019-08-01 ENCOUNTER — CARE COORDINATION (OUTPATIENT)
Dept: CASE MANAGEMENT | Age: 59
End: 2019-08-01

## 2019-08-01 DIAGNOSIS — I60.9 SUBARACHNOID HEMORRHAGE (HCC): Primary | ICD-10-CM

## 2019-08-01 LAB
CULTURE: ABNORMAL
DIRECT EXAM: ABNORMAL
FACTOR V LEIDEN MUTATION: NORMAL
HSV BY PCR: NOT DETECTED
HSV SOURCE: NORMAL
Lab: ABNORMAL
SPECIMEN DESCRIPTION: ABNORMAL
VON WILLEBRAND AG: 119 % (ref 50–160)

## 2019-08-01 NOTE — CARE COORDINATION
[facility]   8/9/2019  3:20 PM [facility]real [facility]   9/18/2019  4:00 PM Veroinka Allison MD [facility] Krysten Ireland RN

## 2019-08-05 ENCOUNTER — CARE COORDINATION (OUTPATIENT)
Dept: CASE MANAGEMENT | Age: 59
End: 2019-08-05

## 2019-08-05 LAB — VON WILLEBRAND MULTIMERS: NORMAL

## 2019-08-06 ENCOUNTER — CARE COORDINATION (OUTPATIENT)
Dept: CASE MANAGEMENT | Age: 59
End: 2019-08-06

## 2019-08-06 ENCOUNTER — CARE COORDINATION (OUTPATIENT)
Dept: CARE COORDINATION | Age: 59
End: 2019-08-06

## 2019-08-06 NOTE — CARE COORDINATION
[address] Transitions Follow Up Call    2019    Patient: Donnell Flores  Patient : 1960   MRN: [id number]  Reason for Admission: [facility]  Discharge Date: 19 RARS: Readmission Risk Score: 12         Line continuously busy, unable to leave message. 2nd attempt.       Sophie Xiong RN

## 2019-08-07 ENCOUNTER — CARE COORDINATION (OUTPATIENT)
Dept: CASE MANAGEMENT | Age: 59
End: 2019-08-07

## 2019-08-08 ENCOUNTER — CARE COORDINATION (OUTPATIENT)
Dept: CARE COORDINATION | Age: 59
End: 2019-08-08

## 2019-08-09 ENCOUNTER — OFFICE VISIT (OUTPATIENT)
Dept: NEUROLOGY | Age: 59
End: 2019-08-09
Payer: COMMERCIAL

## 2019-08-09 ENCOUNTER — CARE COORDINATION (OUTPATIENT)
Dept: CARE COORDINATION | Age: 59
End: 2019-08-09

## 2019-08-09 VITALS — DIASTOLIC BLOOD PRESSURE: 74 MMHG | SYSTOLIC BLOOD PRESSURE: 112 MMHG | HEART RATE: 60 BPM

## 2019-08-09 DIAGNOSIS — I60.9 SUBARACHNOID HEMORRHAGE (HCC): Primary | ICD-10-CM

## 2019-08-09 LAB
F8 INHIBITOR, BETHESDA UNIT: 0 BETHESDA UNITS (ref 0–0)
Lab: 0 BETHESDA UNITS (ref 0–0)

## 2019-08-09 PROCEDURE — 99215 OFFICE O/P EST HI 40 MIN: CPT | Performed by: PSYCHIATRY & NEUROLOGY

## 2019-08-09 NOTE — PROGRESS NOTES
options including Plan to see again in clinic in 2 to 3 months and consider pipeline embolization device.     PLANS:   1. Avoid sympathomimetic agents, avoid selective serotonin reuptake inhibitors. 2. Follow-up in clinic with Dr. Bandar Cabrera in 3 months. Thank you for the interesting evaluation.     Skip Wray MD  Pager [phone]  Stroke, [facility]  [id number] [place]  Electronically signed 8/9/2019 at 4:19 PM

## 2019-08-12 ENCOUNTER — CARE COORDINATION (OUTPATIENT)
Dept: CASE MANAGEMENT | Age: 59
End: 2019-08-12

## 2019-08-16 ENCOUNTER — TELEPHONE (OUTPATIENT)
Dept: FAMILY MEDICINE CLINIC | Age: 59
End: 2019-08-16

## 2019-08-16 DIAGNOSIS — G89.29 CHRONIC BILATERAL LOW BACK PAIN WITH BILATERAL SCIATICA: Primary | ICD-10-CM

## 2019-08-16 DIAGNOSIS — M54.42 CHRONIC BILATERAL LOW BACK PAIN WITH BILATERAL SCIATICA: Primary | ICD-10-CM

## 2019-08-16 DIAGNOSIS — M54.41 CHRONIC BILATERAL LOW BACK PAIN WITH BILATERAL SCIATICA: Primary | ICD-10-CM

## 2019-08-18 RX ORDER — GABAPENTIN 100 MG/1
100 CAPSULE ORAL 3 TIMES DAILY
Qty: 90 CAPSULE | Refills: 0 | Status: SHIPPED | OUTPATIENT
Start: 2019-08-18 | End: 2020-02-11

## 2019-08-27 ENCOUNTER — OFFICE VISIT (OUTPATIENT)
Dept: FAMILY MEDICINE CLINIC | Age: 59
End: 2019-08-27
Payer: COMMERCIAL

## 2019-08-27 VITALS
HEIGHT: 67 IN | SYSTOLIC BLOOD PRESSURE: 118 MMHG | OXYGEN SATURATION: 97 % | HEART RATE: 63 BPM | WEIGHT: 205.6 LBS | DIASTOLIC BLOOD PRESSURE: 54 MMHG | BODY MASS INDEX: 32.27 KG/M2

## 2019-08-27 DIAGNOSIS — G47.00 INSOMNIA, UNSPECIFIED TYPE: ICD-10-CM

## 2019-08-27 DIAGNOSIS — F17.210 CIGARETTE NICOTINE DEPENDENCE WITHOUT COMPLICATION: Primary | ICD-10-CM

## 2019-08-27 DIAGNOSIS — K58.0 IRRITABLE BOWEL SYNDROME WITH DIARRHEA: ICD-10-CM

## 2019-08-27 DIAGNOSIS — R10.84 GENERALIZED ABDOMINAL PAIN: ICD-10-CM

## 2019-08-27 DIAGNOSIS — F41.9 ANXIETY: ICD-10-CM

## 2019-08-27 PROCEDURE — 99214 OFFICE O/P EST MOD 30 MIN: CPT | Performed by: FAMILY MEDICINE

## 2019-08-27 RX ORDER — TRAZODONE HYDROCHLORIDE 100 MG/1
TABLET ORAL
Qty: 90 TABLET | Refills: 3 | Status: SHIPPED | OUTPATIENT
Start: 2019-08-27 | End: 2019-11-06 | Stop reason: SDUPTHER

## 2019-08-27 RX ORDER — BUPROPION HYDROCHLORIDE 300 MG/1
300 TABLET ORAL EVERY MORNING
Qty: 90 TABLET | Refills: 3 | Status: SHIPPED | OUTPATIENT
Start: 2019-08-27 | End: 2021-01-14

## 2019-08-27 RX ORDER — ELUXADOLINE 100 MG/1
100 TABLET, FILM COATED ORAL 2 TIMES DAILY
Qty: 180 TABLET | Refills: 3 | Status: SHIPPED | OUTPATIENT
Start: 2019-08-27 | End: 2019-11-25

## 2019-08-27 RX ORDER — DICYCLOMINE HYDROCHLORIDE 10 MG/1
CAPSULE ORAL
Qty: 720 CAPSULE | Refills: 3 | Status: SHIPPED | OUTPATIENT
Start: 2019-08-27 | End: 2020-07-01

## 2019-08-27 RX ORDER — VARENICLINE TARTRATE 1 MG/1
1 TABLET, FILM COATED ORAL 2 TIMES DAILY
Qty: 180 TABLET | Refills: 1 | Status: SHIPPED | OUTPATIENT
Start: 2019-08-27 | End: 2020-10-13

## 2019-08-27 ASSESSMENT — PATIENT HEALTH QUESTIONNAIRE - PHQ9
2. FEELING DOWN, DEPRESSED OR HOPELESS: 0
SUM OF ALL RESPONSES TO PHQ QUESTIONS 1-9: 0
SUM OF ALL RESPONSES TO PHQ QUESTIONS 1-9: 0
1. LITTLE INTEREST OR PLEASURE IN DOING THINGS: 0
SUM OF ALL RESPONSES TO PHQ9 QUESTIONS 1 & 2: 0

## 2019-08-27 NOTE — PROGRESS NOTES
[facility]  [facility] [address] [place] [address]  Dept: [phone]      Martha Mckinney is a 62 y.o. female who presents today for follow up on her  medical conditions as noted below. Chief Complaint   Patient presents with   [address]     no longer wants to see Dr. Darien Anaya Medication Refill       Patient Active Problem List:     Tobacco use disorder, continuous     Generalized anxiety disorder     Chronic bilateral low back pain with bilateral sciatica     Insomnia     Adenomatous polyp     Chronic prescription opiate use     Essential hypertension     Mixed hyperlipidemia     Subarachnoid hemorrhage (HCC)     Severe episode of recurrent major depressive disorder, without psychotic features ([facility] [address]     Anxiety     Past Medical History:   Diagnosis Date    Arthritis     DDD    Depression     Generalized anxiety disorder 1/22/2016    Hyperlipidemia     Methadone dependence ([facility] [address] 1/22/2016    Tobacco use disorder, continuous 1/22/2016      Past Surgical History:   Procedure Laterality Date    ANKLE FRACTURE SURGERY Left     APPENDECTOMY      BACK SURGERY      fusion, hardware. Dr. Boo Harriss    COLONOSCOPY  05/04/2017    HYPERPLASTIC POLYP       COLONOSCOPY  04/24/2017    ADENOMATOUS POLYP.      DILATION AND CURETTAGE OF UTERUS      FOOT SURGERY      KNEE SURGERY Bilateral     [place] COL[place] W/BIOPSY SINGLE/MULTIPLE N/A 4/24/2017    COLONOSCOPY WITH BIOPSY performed by Van Parra MD at [address] W/BIOPSY SINGLE/MULTIPLE N/A 5/4/2017    COLONOSCOPY WITH BIOPSY performed by Van Parra MD at [address] [place] [address] EGD TRANSORAL BIOPSY SINGLE/MULTIPLE N/A 5/2/2017    EGD BIOPSY performed by Van Parra MD at [facility] ENDOSCOPY  05/02/2017     Family History   Problem Relation Age of Onset    Diabetes Father     Substance Abuse normal heart sounds. No murmur heard. Pulmonary/Chest: Effort normal and breath sounds normal. She has no wheezes. Shehas no rales. Abdominal: Soft. Bowel sounds are normal. She exhibits no distension and no mass. There is no tenderness. There is no rebound and no guarding. Genitourinary/Anorectal:deferred  Musculoskeletal: Normal range of motion. She exhibits no edema or tenderness. Lymphadenopathy: She has no cervical adenopathy. Neurological: She is alert and oriented to person, place, and time. She has normal reflexes. Skin: Skin is warm and dry. No rash noted. Psychiatric: She has a normal mood and affect. Her   behavior is normal.       Assessment:      1. Cigarette nicotine dependence without complication    2. Generalized abdominal pain    3. Irritable bowel syndrome with diarrhea    4. Anxiety    5. Insomnia, unspecified type          Plan:      Call or return to clinic prn if these symptoms worsen or fail to improve as anticipated. I have reviewed the instructions with the patient, answering all questions to her satisfaction. No follow-ups on file. No orders of the defined types were placed in this encounter. Orders Placed This Encounter   Medications    buPROPion (WELLBUTRIN XL) 300 MG extended release tablet     Sig: Take 1 tablet by mouth every morning     Dispense:  90 tablet     Refill:  3    varenicline (CHANTIX CONTINUING MONTH DIMITRIS) 1 MG tablet     Sig: Take 1 tablet by mouth 2 times daily     Dispense:  180 tablet     Refill:  1    dicyclomine (BENTYL) 10 MG capsule     Sig: TAKE 3 CAPSULE THREE TIMES A DAY AS NEEDED FOR ABDOMINAL PAIN, TAKE WITH THE 20 MG DOSE     Dispense:  720 capsule     Refill:  3    sertraline (ZOLOFT) 50 MG tablet     Sig: Take 3 tablets by mouth daily     Dispense:  90 tablet     Refill:  3    VIBERZI 100 MG TABS     Sig: Take 100 mg by mouth 2 times daily for 90 days.      Dispense:  180 tablet     Refill:  3    traZODone (DESYREL) 100 MG

## 2019-09-25 ENCOUNTER — TELEPHONE (OUTPATIENT)
Dept: FAMILY MEDICINE CLINIC | Age: 59
End: 2019-09-25

## 2019-09-25 DIAGNOSIS — M54.41 CHRONIC BILATERAL LOW BACK PAIN WITH BILATERAL SCIATICA: Primary | ICD-10-CM

## 2019-09-25 DIAGNOSIS — M54.42 CHRONIC BILATERAL LOW BACK PAIN WITH BILATERAL SCIATICA: Primary | ICD-10-CM

## 2019-09-25 DIAGNOSIS — G89.29 CHRONIC BILATERAL LOW BACK PAIN WITH BILATERAL SCIATICA: Primary | ICD-10-CM

## 2019-09-25 RX ORDER — GABAPENTIN 600 MG/1
600 TABLET ORAL 2 TIMES DAILY
Qty: 90 TABLET | Refills: 3 | Status: SHIPPED | OUTPATIENT
Start: 2019-09-25 | End: 2020-03-02

## 2019-11-06 DIAGNOSIS — G89.29 CHRONIC BILATERAL LOW BACK PAIN WITH BILATERAL SCIATICA: ICD-10-CM

## 2019-11-06 DIAGNOSIS — M54.42 CHRONIC BILATERAL LOW BACK PAIN WITH BILATERAL SCIATICA: ICD-10-CM

## 2019-11-06 DIAGNOSIS — F41.9 ANXIETY: ICD-10-CM

## 2019-11-06 DIAGNOSIS — M54.41 CHRONIC BILATERAL LOW BACK PAIN WITH BILATERAL SCIATICA: ICD-10-CM

## 2019-11-06 DIAGNOSIS — G47.00 INSOMNIA, UNSPECIFIED TYPE: ICD-10-CM

## 2019-11-06 RX ORDER — TRAZODONE HYDROCHLORIDE 100 MG/1
TABLET ORAL
Qty: 90 TABLET | Refills: 3 | Status: SHIPPED | OUTPATIENT
Start: 2019-11-06 | End: 2020-07-01 | Stop reason: SDUPTHER

## 2019-11-06 RX ORDER — METHOCARBAMOL 500 MG/1
500 TABLET, FILM COATED ORAL 3 TIMES DAILY PRN
Qty: 90 TABLET | Refills: 3 | Status: SHIPPED | OUTPATIENT
Start: 2019-11-06 | End: 2020-03-19

## 2019-11-15 ENCOUNTER — TELEPHONE (OUTPATIENT)
Dept: NEUROSURGERY | Age: 59
End: 2019-11-15

## 2019-12-10 ENCOUNTER — INITIAL CONSULT (OUTPATIENT)
Dept: PAIN MANAGEMENT | Age: 59
End: 2019-12-10
Payer: COMMERCIAL

## 2019-12-10 VITALS
SYSTOLIC BLOOD PRESSURE: 113 MMHG | DIASTOLIC BLOOD PRESSURE: 67 MMHG | OXYGEN SATURATION: 94 % | BODY MASS INDEX: 34.31 KG/M2 | HEIGHT: 67 IN | HEART RATE: 57 BPM | WEIGHT: 218.6 LBS

## 2019-12-10 DIAGNOSIS — G89.29 CHRONIC NECK PAIN: ICD-10-CM

## 2019-12-10 DIAGNOSIS — G89.29 CHRONIC BILATERAL LOW BACK PAIN WITH BILATERAL SCIATICA: Primary | ICD-10-CM

## 2019-12-10 DIAGNOSIS — M54.41 CHRONIC BILATERAL LOW BACK PAIN WITH BILATERAL SCIATICA: Primary | ICD-10-CM

## 2019-12-10 DIAGNOSIS — M54.42 CHRONIC BILATERAL LOW BACK PAIN WITH BILATERAL SCIATICA: Primary | ICD-10-CM

## 2019-12-10 DIAGNOSIS — M54.2 CHRONIC NECK PAIN: ICD-10-CM

## 2019-12-10 DIAGNOSIS — Z98.1 HISTORY OF LUMBAR FUSION: ICD-10-CM

## 2019-12-10 PROCEDURE — 99244 OFF/OP CNSLTJ NEW/EST MOD 40: CPT | Performed by: ANESTHESIOLOGY

## 2019-12-10 RX ORDER — MAGNESIUM 30 MG
200 TABLET ORAL 2 TIMES DAILY
COMMUNITY
End: 2021-01-14

## 2019-12-10 ASSESSMENT — ENCOUNTER SYMPTOMS
EYES NEGATIVE: 1
ABDOMINAL PAIN: 1
ALLERGIC/IMMUNOLOGIC NEGATIVE: 1
BACK PAIN: 1

## 2019-12-13 DIAGNOSIS — M62.838 MUSCLE SPASM: ICD-10-CM

## 2019-12-16 RX ORDER — TIZANIDINE 2 MG/1
TABLET ORAL
Qty: 180 TABLET | Refills: 12 | Status: SHIPPED | OUTPATIENT
Start: 2019-12-16 | End: 2020-10-08 | Stop reason: SDUPTHER

## 2020-01-29 ENCOUNTER — TELEPHONE (OUTPATIENT)
Dept: PAIN MANAGEMENT | Age: 60
End: 2020-01-29

## 2020-01-30 ENCOUNTER — TELEPHONE (OUTPATIENT)
Dept: NEUROLOGY | Age: 60
End: 2020-01-30

## 2020-02-11 ENCOUNTER — OFFICE VISIT (OUTPATIENT)
Dept: PAIN MANAGEMENT | Age: 60
End: 2020-02-11
Payer: COMMERCIAL

## 2020-02-11 VITALS
OXYGEN SATURATION: 95 % | BODY MASS INDEX: 34.25 KG/M2 | HEIGHT: 67 IN | WEIGHT: 218.2 LBS | DIASTOLIC BLOOD PRESSURE: 80 MMHG | SYSTOLIC BLOOD PRESSURE: 138 MMHG | HEART RATE: 82 BPM

## 2020-02-11 PROBLEM — G89.29 CHRONIC NECK PAIN: Status: ACTIVE | Noted: 2020-02-11

## 2020-02-11 PROBLEM — M54.2 CHRONIC NECK PAIN: Status: ACTIVE | Noted: 2020-02-11

## 2020-02-11 PROBLEM — G89.4 CHRONIC PAIN SYNDROME: Status: ACTIVE | Noted: 2020-02-11

## 2020-02-11 PROCEDURE — 99213 OFFICE O/P EST LOW 20 MIN: CPT | Performed by: ANESTHESIOLOGY

## 2020-02-11 ASSESSMENT — ENCOUNTER SYMPTOMS
VOMITING: 1
DIARRHEA: 1
SHORTNESS OF BREATH: 0
BACK PAIN: 1
CONSTIPATION: 0
CHEST TIGHTNESS: 0
ABDOMINAL PAIN: 1

## 2020-02-11 NOTE — PROGRESS NOTES
The patient is a 61 y. o. Non-/non  female. Chief Complaint   Patient presents with    Follow-up     Was supposed to do aqua therapy, but cannot afford it.  Knee Pain     Right knee    Back Pain    Neck Pain        HPI     - 60-year-old woman who was seen in my clinic 2 months back for 20+ year history of low back pain  Also had history of chronic neck pain  History of 4 previous lumbar spine fusion surgery  Was seeing pain management at another facility and was on high-dose opioid, was discharged from that practice with unclear reasons    No physical therapy for more than 10 years  No diagnostic work-up for more than 10 years  Patient reported that she had failed all non-opioid modalities    We ordered diagnostic work-up with plain x-ray film of cervical and lumbar spine and patient was referred for physical therapy    Today here for follow-up after 2 months  Patient did not complete x-rays, did not complete EMG nerve testing, did not start physical therapy  She states she is unable to afford these measures    Patient identified pain in back leg and neck  Denies any progressive leg weakness  No history of bladder or bowel incontinence  Pain is constant  Describes it as sharp  Rates intensity 10 out of 10    She states that she has stopped using marijuana     she is here today to discuss options. She was supposed to do aqua therapy, but they cannot afford it. They were turned down for financial assistance. Past Medical History:   Diagnosis Date    Arthritis     DDD    Depression     Generalized anxiety disorder 1/22/2016    Hyperlipidemia     Methadone dependence ([facility] [address] 1/22/2016    Tobacco use disorder, continuous 1/22/2016      Past Surgical History:   Procedure Laterality Date    ANKLE FRACTURE SURGERY Left     APPENDECTOMY      BACK SURGERY      fusion, hardware.  Dr. Taye Meza    COLONOSCOPY  05/04/2017    HYPERPLASTIC POLYP       COLONOSCOPY  04/24/2017 Forced sexual activity: None   Other Topics Concern    None   Social History Narrative    None     Family History   Problem Relation Age of Onset    Diabetes Father     Substance Abuse Daughter     Substance Abuse Daughter     Mental Illness Daughter      Allergies   Allergen Reactions    Penicillins Rash     Penicillins   Vitals:    02/11/20 0832   BP: 138/80   Pulse: 82   SpO2: 95%     Current Outpatient Medications   Medication Sig Dispense Refill    tiZANidine (ZANAFLEX) 2 MG tablet TAKE 2 TABLETS EVERY 8 HOURS AS NEEDED FOR MUSCLE SPASM (SEDATION WARNING) 180 tablet 12    magnesium 30 MG tablet Take 200 mg by mouth 2 times daily      sertraline (ZOLOFT) 50 MG tablet Take 3 tablets by mouth daily 90 tablet 3    traZODone (DESYREL) 100 MG tablet TAKE  TABLETS NIGHTLY 90 tablet 3    methocarbamol (ROBAXIN) 500 MG tablet Take 1 tablet by mouth 3 times daily as needed (muscle spasms) 90 tablet 3    gabapentin (NEURONTIN) 600 MG tablet Take 1 tablet by mouth 2 times daily for 59 days.  90 tablet 3    buPROPion (WELLBUTRIN XL) 300 MG extended release tablet Take 1 tablet by mouth every morning 90 tablet 3    varenicline (CHANTIX CONTINUING MONTH DIMITRIS) 1 MG tablet Take 1 tablet by mouth 2 times daily 180 tablet 1    dicyclomine (BENTYL) 10 MG capsule TAKE 3 CAPSULE THREE TIMES A DAY AS NEEDED FOR ABDOMINAL PAIN, TAKE WITH THE 20 MG DOSE 720 capsule 3    acetaminophen (TYLENOL) 500 MG tablet Take 2 tablets by mouth every 6 hours as needed for Pain 120 tablet 3    sertraline (ZOLOFT) 100 MG tablet Take 1.5 tablets by mouth daily TAKE 1 TABLET DAILY 60 tablet 0    topiramate (TOPAMAX) 200 MG tablet Take 200 mg by mouth 2 times daily      tiZANidine (ZANAFLEX) 2 MG tablet Take 2 mg by mouth every 8 hours as needed (2 tabs every 8hrs prn)      cloNIDine (CATAPRES-TTS-2) 0.2 MG/24HR PTWK Place 1 patch onto the skin every 7 days 4 patch 3    omeprazole (PRILOSEC) 40 MG delayed release capsule TAKE 1 CAPSULE DAILY 90 capsule 3    simvastatin (ZOCOR) 20 MG tablet Take 1 tablet by mouth nightly 90 tablet 3    bisoprolol (ZEBETA) 5 MG tablet Take 0.5 tablets by mouth daily 45 tablet 3    docusate sodium (COLACE) 100 MG capsule Take 1 capsule by mouth 2 times daily as needed for Constipation 60 capsule 2    medical marijuana Take by mouth as needed.  ondansetron (ZOFRAN) 8 MG tablet Take 1 tablet by mouth every 8 hours as needed for Nausea or Vomiting 30 tablet 0    Cholecalciferol (VITAMIN D3) 5000 units TABS Take 1 tablet by mouth daily (with breakfast) 90 tablet 1     No current facility-administered medications for this visit. Review of Systems   Constitutional: Negative for chills and fever. Respiratory: Negative for chest tightness and shortness of breath. Cardiovascular: Positive for leg swelling. Negative for chest pain and palpitations. Gastrointestinal: Positive for abdominal pain, diarrhea and vomiting. Negative for constipation. Genitourinary: Negative for difficulty urinating. Musculoskeletal: Positive for back pain and neck pain. Right knee pain   Neurological: Positive for dizziness, light-headedness and headaches. Objective:  General Appearance:  Uncomfortable and in pain. Vital signs: (most recent): Blood pressure 138/80, pulse 82, height 5' 7\" (1.702 m), weight 218 lb 3.2 oz (99 kg), SpO2 95 %, not currently breastfeeding. Vital signs are normal.  No fever. Output: Producing urine and producing stool. HEENT: Normal HEENT exam.    Lungs:  Normal effort and normal respiratory rate. Breath sounds clear to auscultation. She is not in respiratory distress. Heart: Normal rate. Regular rhythm. Abdomen: Abdomen is soft. Extremities: Normal range of motion. There is no deformity. Neurological: Patient is alert and oriented to person, place and time. Normal strength. Patient has normal reflexes, normal muscle tone and normal coordination.

## 2020-03-01 NOTE — TELEPHONE ENCOUNTER
Patient is asking if Dr Elisha Rosas prescribes suboxin. She has tried this in the past and it has helped. She said she has not smoked any marijuana in over a year and just wanted to let you know this.
Writer spoke to pt to inform her that this can be further discussed with Dr. Yoan Nice during her next OV on 2/18/20. Pt states that she has tried Suboxone in the past and it helped her pain and she would have have Suboxone than opioids. Pt states she is unable to complete xrays and other testing at this time because she is going through a financial hardtime but will try to complete the xrays.  Pt would like to know if Suboxone could be prescribed
normal...

## 2020-03-02 RX ORDER — GABAPENTIN 600 MG/1
TABLET ORAL
Qty: 90 TABLET | Refills: 8 | Status: SHIPPED | OUTPATIENT
Start: 2020-03-02 | End: 2020-08-28 | Stop reason: SDUPTHER

## 2020-03-19 RX ORDER — METHOCARBAMOL 500 MG/1
TABLET, FILM COATED ORAL
Qty: 90 TABLET | Refills: 11 | Status: SHIPPED | OUTPATIENT
Start: 2020-03-19 | End: 2020-09-10 | Stop reason: SDUPTHER

## 2020-06-24 ENCOUNTER — TELEPHONE (OUTPATIENT)
Dept: FAMILY MEDICINE CLINIC | Age: 60
End: 2020-06-24

## 2020-06-26 ENCOUNTER — TELEPHONE (OUTPATIENT)
Dept: FAMILY MEDICINE CLINIC | Age: 60
End: 2020-06-26

## 2020-07-01 ENCOUNTER — OFFICE VISIT (OUTPATIENT)
Dept: FAMILY MEDICINE CLINIC | Age: 60
End: 2020-07-01
Payer: COMMERCIAL

## 2020-07-01 VITALS
SYSTOLIC BLOOD PRESSURE: 116 MMHG | HEART RATE: 56 BPM | DIASTOLIC BLOOD PRESSURE: 78 MMHG | BODY MASS INDEX: 35.49 KG/M2 | WEIGHT: 226.6 LBS | OXYGEN SATURATION: 97 %

## 2020-07-01 PROCEDURE — 99214 OFFICE O/P EST MOD 30 MIN: CPT | Performed by: FAMILY MEDICINE

## 2020-07-01 RX ORDER — DICYCLOMINE HYDROCHLORIDE 10 MG/1
CAPSULE ORAL
Qty: 720 CAPSULE | Refills: 3 | Status: SHIPPED | OUTPATIENT
Start: 2020-07-01 | End: 2020-09-19

## 2020-07-01 RX ORDER — TRAZODONE HYDROCHLORIDE 150 MG/1
TABLET ORAL
Qty: 90 TABLET | Refills: 3 | Status: SHIPPED | OUTPATIENT
Start: 2020-07-01 | End: 2021-01-18 | Stop reason: ALTCHOICE

## 2020-07-01 RX ORDER — ALBUTEROL SULFATE 90 UG/1
2 AEROSOL, METERED RESPIRATORY (INHALATION) 4 TIMES DAILY PRN
Qty: 3 INHALER | Refills: 1 | Status: SHIPPED | OUTPATIENT
Start: 2020-07-01 | End: 2021-07-08

## 2020-07-01 RX ORDER — OMEPRAZOLE 40 MG/1
CAPSULE, DELAYED RELEASE ORAL
Qty: 90 CAPSULE | Refills: 3 | Status: SHIPPED | OUTPATIENT
Start: 2020-07-01 | End: 2021-07-08 | Stop reason: ALTCHOICE

## 2020-07-01 ASSESSMENT — PATIENT HEALTH QUESTIONNAIRE - PHQ9
2. FEELING DOWN, DEPRESSED OR HOPELESS: 0
SUM OF ALL RESPONSES TO PHQ9 QUESTIONS 1 & 2: 0
SUM OF ALL RESPONSES TO PHQ QUESTIONS 1-9: 0
1. LITTLE INTEREST OR PLEASURE IN DOING THINGS: 0
SUM OF ALL RESPONSES TO PHQ QUESTIONS 1-9: 0

## 2020-07-01 NOTE — PROGRESS NOTES
[facility]  [address] [place] [address] [address] [place] [address]  Dept: [phone]      Hesham Roque is a 61 y.o. female who presents today for follow up on her  medical conditions as noted below. Chief Complaint   Patient presents with    Medication Refill     hasnt been seen in almost a year       Patient Active Problem List:     Tobacco use disorder, continuous     Generalized anxiety disorder     Chronic bilateral low back pain with bilateral sciatica     Insomnia     Adenomatous polyp     Chronic prescription opiate use     Essential hypertension     Mixed hyperlipidemia     Subarachnoid hemorrhage (HCC)     Severe episode of recurrent major depressive disorder, without psychotic features ([address]     Anxiety     Chronic neck pain     Chronic pain syndrome     Past Medical History:   Diagnosis Date    Arthritis     DDD    Depression     Generalized anxiety disorder 1/22/2016    Hyperlipidemia     Methadone dependence ([facility] [address] 1/22/2016    Tobacco use disorder, continuous 1/22/2016      Past Surgical History:   Procedure Laterality Date    ANKLE FRACTURE SURGERY Left     APPENDECTOMY      BACK SURGERY      fusion, hardware. Dr. Poly Sue    COLONOSCOPY  05/04/2017    HYPERPLASTIC POLYP       COLONOSCOPY  04/24/2017    ADENOMATOUS POLYP.      DILATION AND CURETTAGE OF UTERUS      FOOT SURGERY      KNEE SURGERY Bilateral     [place] COL[place] W/BIOPSY SINGLE/MULTIPLE N/A 4/24/2017    COLONOSCOPY WITH BIOPSY performed by Glenn Rush MD at [address] W/BIOPSY SINGLE/MULTIPLE N/A 5/4/2017    COLONOSCOPY WITH BIOPSY performed by Glenn Rush MD at [address] EGD TRANSORAL BIOPSY SINGLE/MULTIPLE N/A 5/2/2017    EGD BIOPSY performed by Glenn Rush MD at [facility] ENDOSCOPY  05/02/2017     Family History   Problem Relation Age of Onset    Diabetes Father     Substance Abuse Daughter     Substance Abuse Daughter     Mental Illness Daughter        Current Outpatient Medications   Medication Sig Dispense Refill    traZODone (DESYREL) 150 MG tablet TAKE  TABLETS NIGHTLY 90 tablet 3    omeprazole (PRILOSEC) 40 MG delayed release capsule TAKE 1 CAPSULE DAILY 90 capsule 3    dicyclomine (BENTYL) 10 MG capsule TAKE 3 CAPSULE THREE TIMES A DAY AS NEEDED FOR ABDOMINAL PAIN, TAKE WITH THE 20 MG DOSE 720 capsule 3    albuterol sulfate HFA (VENTOLIN HFA) 108 (90 Base) MCG/ACT inhaler Inhale 2 puffs into the lungs 4 times daily as needed for Wheezing 3 Inhaler 1    tiZANidine (ZANAFLEX) 2 MG tablet TAKE 2 TABLETS EVERY 8 HOURS AS NEEDED FOR MUSCLE SPASM (SEDATION WARNING) 180 tablet 12    acetaminophen (TYLENOL) 500 MG tablet Take 2 tablets by mouth every 6 hours as needed for Pain 120 tablet 3    sertraline (ZOLOFT) 100 MG tablet Take 1.5 tablets by mouth daily TAKE 1 TABLET DAILY 60 tablet 0    cloNIDine (CATAPRES-TTS-2) 0.2 MG/24HR PTWK Place 1 patch onto the skin every 7 days 4 patch 3    Cholecalciferol (VITAMIN D3) 5000 units TABS Take 1 tablet by mouth daily (with breakfast) 90 tablet 1    methocarbamol (ROBAXIN) 500 MG tablet TAKE 1 TABLET THREE TIMES A DAY AS NEEDED FOR MUSCLE SPASMS 90 tablet 11    gabapentin (NEURONTIN) 600 MG tablet TAKE 1 TABLET TWICE A DAY 90 tablet 8    sertraline (ZOLOFT) 50 MG tablet TAKE 3 TABLETS DAILY 90 tablet 12    magnesium 30 MG tablet Take 200 mg by mouth 2 times daily      buPROPion (WELLBUTRIN XL) 300 MG extended release tablet Take 1 tablet by mouth every morning (Patient not taking: Reported on 7/1/2020) 90 tablet 3    varenicline (CHANTIX CONTINUING MONTH DIMITRIS) 1 MG tablet Take 1 tablet by mouth 2 times daily (Patient not taking: Reported on 7/1/2020) 180 tablet 1    topiramate (TOPAMAX) 200 MG tablet Take 200 mg by mouth 2 times daily      tiZANidine (ZANAFLEX) 2 MG tablet Take 2 mg by mouth every 8 hours as needed (2 tabs every 8hrs prn)      simvastatin (ZOCOR) 20 MG tablet Take 1 tablet by mouth nightly (Patient not taking: Reported on 7/1/2020) 90 tablet 3    bisoprolol (ZEBETA) 5 MG tablet Take 0.5 tablets by mouth daily 45 tablet 3    docusate sodium (COLACE) 100 MG capsule Take 1 capsule by mouth 2 times daily as needed for Constipation 60 capsule 2    medical marijuana Take by mouth as needed.  ondansetron (ZOFRAN) 8 MG tablet Take 1 tablet by mouth every 8 hours as needed for Nausea or Vomiting 30 tablet 0     No current facility-administered medications for this visit. ALLERGIES:    Allergies   Allergen Reactions    Penicillins Rash       Social History     Tobacco Use    Smoking status: Current Every Day Smoker     Packs/day: 1.00     Types: Cigarettes    Smokeless tobacco: Never Used    Tobacco comment: almost off cigarettes, but now vaping. Substance Use Topics    Alcohol use: No     Alcohol/week: 0.0 standard drinks     Comment: denies any heavy use. LDL Cholesterol (mg/dL)   Date Value   03/19/2019 151 (H)     HDL (mg/dL)   Date Value   03/19/2019 44     BUN (mg/dL)   Date Value   07/30/2019 8     CREATININE (mg/dL)   Date Value   07/30/2019 0.61     Glucose (mg/dL)   Date Value   07/30/2019 103 (H)     Hemoglobin A1C (%)   Date Value   07/29/2019 6.4 (H)              Subjective:      HPI  She is here today to get refills of her medications for some ongoing medical disorders she will be losing her insurance. She is not necessarily having any new complaints or problems today other than she is having increased breathing issues but she continues to smoke she would like a refill of an inhaler  She also wanted a handicap sticker but she really does not meet criteria to have that    Review of Systems:     Constitutional: Negative for fever, appetite change and fatigue. Family social and medical history reviewed and unchanged     HENT: Negative.   Negative for nosebleeds, trouble swallowing and neck pain. Eyes: Negative for photophobia and visual disturbance. Respiratory: Negative. Negative for chest tightness and shortness of breath. Cardiovascular: Negative. Negative for chest pain and leg swelling. Gastrointestinal: Negative. Negative for abdominal pain and blood in stool. Endocrine: Negative for cold intolerance and polyuria. Genitourinary: Negative for dysuria and hematuria. Musculoskeletal: Negative. Skin: Negative for rash. Allergic/Immunologic: Negative. Neurological: Negative. Negative for dizziness, weakness and numbness. Hematological: Negative. Negative for adenopathy. Does not bruise/bleed easily. Psychiatric/Behavioral: Negative for sleep disturbance, dysphoric mood and  decreased concentration. The patient is not nervous/anxious. Objective:     Physical Exam:     Nursing note and vitals reviewed. /78   Pulse 56   Wt 226 lb 9.6 oz (102.8 kg)   SpO2 97%   BMI 35.49 kg/m²   Constitutional: She is oriented to person, place, and time. She   appears well-developed and well-nourished. HENT:   Head: Normocephalic and atraumatic. Right Ear: External ear normal. Tympanic membrane is not erythematous. No middle ear effusion. Left Ear: External ear normal. Tympanic membrane is not erythematous. No middle ear effusion. Nose: No mucosal edema. Mouth/Throat: Oropharynx is clear and moist. No posterior oropharyngeal erythema. Eyes: Conjunctivae and EOM are normal. Pupils are equal, round, and reactive to light. Neck: Normal range of motion. Neck supple. No thyromegaly present. Cardiovascular: Normal rate, regular rhythm and normal heart sounds. No murmur heard. Pulmonary/Chest: Effort normal and breath sounds normal. She has no wheezes. Shehas no rales. Abdominal: Soft. Bowel sounds are normal. She exhibits no distension and no mass. There is no tenderness. There is no rebound and no guarding. Genitourinary/Anorectal:deferred  Musculoskeletal: Normal range of motion. She exhibits no edema or tenderness. Lymphadenopathy: She has no cervical adenopathy. Neurological: She is alert and oriented to person, place, and time. She has normal reflexes. Skin: Skin is warm and dry. No rash noted. Psychiatric: She has a normal mood and affect. Her   behavior is normal.       Assessment:      1. Insomnia, unspecified type    2. Gastroduodenitis    3. Generalized abdominal pain    4. Irritable bowel syndrome with diarrhea    5. Moderate persistent asthma without complication          Plan:      Call or return to clinic prn if these symptoms worsen or fail to improve as anticipated. I have reviewed the instructions with the patient, answering all questions to her satisfaction. No follow-ups on file. No orders of the defined types were placed in this encounter.     Orders Placed This Encounter   Medications    traZODone (DESYREL) 150 MG tablet     Sig: TAKE  TABLETS NIGHTLY     Dispense:  90 tablet     Refill:  3    omeprazole (PRILOSEC) 40 MG delayed release capsule     Sig: TAKE 1 CAPSULE DAILY     Dispense:  90 capsule     Refill:  3    dicyclomine (BENTYL) 10 MG capsule     Sig: TAKE 3 CAPSULE THREE TIMES A DAY AS NEEDED FOR ABDOMINAL PAIN, TAKE WITH THE 20 MG DOSE     Dispense:  720 capsule     Refill:  3    albuterol sulfate HFA (VENTOLIN HFA) 108 (90 Base) MCG/ACT inhaler     Sig: Inhale 2 puffs into the lungs 4 times daily as needed for Wheezing     Dispense:  3 Inhaler     Refill:  1   Refills of the above medications were given  Electronically signed by Shoshana Fox DO on 7/1/2020 at 3:51 PM

## 2020-08-25 ENCOUNTER — TELEPHONE (OUTPATIENT)
Dept: FAMILY MEDICINE CLINIC | Age: 60
End: 2020-08-25

## 2020-08-25 RX ORDER — PRAZOSIN HYDROCHLORIDE 1 MG/1
CAPSULE ORAL
COMMUNITY
Start: 2020-06-26 | End: 2020-10-13 | Stop reason: ALTCHOICE

## 2020-08-25 NOTE — TELEPHONE ENCOUNTER
Needs to find a new psychiatrist due to having Ins now wants to know if you can fill rexulti and Minipres

## 2020-08-26 RX ORDER — PRAZOSIN HYDROCHLORIDE 1 MG/1
1 CAPSULE ORAL NIGHTLY
Qty: 30 CAPSULE | Refills: 5 | Status: SHIPPED | OUTPATIENT
Start: 2020-08-26 | End: 2021-03-18 | Stop reason: SDUPTHER

## 2020-08-28 RX ORDER — GABAPENTIN 600 MG/1
TABLET ORAL
Qty: 90 TABLET | Refills: 8 | Status: SHIPPED | OUTPATIENT
Start: 2020-08-28 | End: 2022-09-08

## 2020-08-28 NOTE — TELEPHONE ENCOUNTER
Patient states the rexulti that is called in, is no longer covered by insurance and to pay out of pocket the medication is 1000 dollars patient is asking for a generic of medication.  Please advise

## 2020-09-08 ASSESSMENT — ENCOUNTER SYMPTOMS
VISUAL CHANGE: 0
BACK PAIN: 1

## 2020-09-08 NOTE — PROGRESS NOTES
HPI: Back Pain   This is a chronic problem. The current episode started more than 1 year ago. The problem occurs constantly. The problem has been rapidly worsening since onset. The pain is present in the lumbar spine and gluteal. The quality of the pain is described as aching, burning, cramping, shooting and stabbing. The pain radiates to the left thigh, right thigh, right foot, left knee, right knee and left foot. The pain is at a severity of 10/10. The pain is severe. The pain is the same all the time. The symptoms are aggravated by standing, sitting, position, lying down, bending, stress, twisting and coughing. Stiffness is present all day. Associated symptoms include leg pain. Pertinent negatives include no chest pain, fever, paresis or perianal numbness. She has tried chiropractic manipulation, heat, ice, home exercises, muscle relaxant, NSAIDs, analgesics, bed rest and walking for the symptoms. The treatment provided no relief. Neck Pain    This is a chronic problem. The current episode started more than 1 year ago. The problem occurs constantly. The problem has been rapidly worsening. The pain is present in the occipital region, left side and right side. The quality of the pain is described as stabbing, shooting, cramping, burning and aching. The pain is at a severity of 10/10. The pain is severe. The symptoms are aggravated by bending, position, coughing, stress, sneezing, twisting and swallowing. The pain is same all the time. Stiffness is present all day. Associated symptoms include leg pain. Pertinent negatives include no chest pain, fever, pain with swallowing, paresis or visual change. She has tried home exercises, heat, chiropractic manipulation, acetaminophen, bed rest, neck support, muscle relaxants, ice, oral narcotics and NSAIDs for the symptoms. The treatment provided mild relief. Knee Pain    The incident occurred more than 1 week ago. The pain is present in the left knee and right knee.  The BIOPSY performed by Harish Moser MD at . [address] W/BIOPSY SINGLE/MULTIPLE N/A 5/4/2017    COLONOSCOPY WITH BIOPSY performed by Harish Moser MD at [address] EGD TRANSORAL BIOPSY SINGLE/MULTIPLE N/A 5/2/2017    EGD BIOPSY performed by Harish Moser MD at [address] [facility] ENDOSCOPY  05/02/2017       Allergies   Allergen Reactions    Penicillins Rash         Current Outpatient Medications:     gabapentin (NEURONTIN) 600 MG tablet, TAKE 1 TABLET TWICE A DAY, Disp: 90 tablet, Rfl: 8    prazosin (MINIPRESS) 1 MG capsule, Take 1 capsule by mouth nightly, Disp: 30 capsule, Rfl: 5    brexpiprazole (REXULTI) 1 MG TABS tablet, Take 1 tablet by mouth daily, Disp: 30 tablet, Rfl: 5    prazosin (MINIPRESS) 1 MG capsule, , Disp: , Rfl:     traZODone (DESYREL) 150 MG tablet, TAKE  TABLETS NIGHTLY, Disp: 90 tablet, Rfl: 3    omeprazole (PRILOSEC) 40 MG delayed release capsule, TAKE 1 CAPSULE DAILY, Disp: 90 capsule, Rfl: 3    dicyclomine (BENTYL) 10 MG capsule, TAKE 3 CAPSULE THREE TIMES A DAY AS NEEDED FOR ABDOMINAL PAIN, TAKE WITH THE 20 MG DOSE, Disp: 720 capsule, Rfl: 3    albuterol sulfate HFA (VENTOLIN HFA) 108 (90 Base) MCG/ACT inhaler, Inhale 2 puffs into the lungs 4 times daily as needed for Wheezing, Disp: 3 Inhaler, Rfl: 1    methocarbamol (ROBAXIN) 500 MG tablet, TAKE 1 TABLET THREE TIMES A DAY AS NEEDED FOR MUSCLE SPASMS, Disp: 90 tablet, Rfl: 11    sertraline (ZOLOFT) 50 MG tablet, TAKE 3 TABLETS DAILY, Disp: 90 tablet, Rfl: 12    tiZANidine (ZANAFLEX) 2 MG tablet, TAKE 2 TABLETS EVERY 8 HOURS AS NEEDED FOR MUSCLE SPASM (SEDATION WARNING), Disp: 180 tablet, Rfl: 12    magnesium 30 MG tablet, Take 200 mg by mouth 2 times daily, Disp: , Rfl:     buPROPion (WELLBUTRIN XL) 300 MG extended release tablet, Take 1 tablet by mouth every morning, Disp: 90 tablet, Rfl: 3    varenicline (CHANTIX CONTINUING MONTH DIMITRIS) 1 MG tablet, Take 1 tablet by mouth 2 times daily, Disp: 180 tablet, Rfl: 1    acetaminophen (TYLENOL) 500 MG tablet, Take 2 tablets by mouth every 6 hours as needed for Pain, Disp: 120 tablet, Rfl: 3    sertraline (ZOLOFT) 100 MG tablet, Take 1.5 tablets by mouth daily TAKE 1 TABLET DAILY, Disp: 60 tablet, Rfl: 0    topiramate (TOPAMAX) 200 MG tablet, Take 200 mg by mouth 2 times daily, Disp: , Rfl:     tiZANidine (ZANAFLEX) 2 MG tablet, Take 2 mg by mouth every 8 hours as needed (2 tabs every 8hrs prn), Disp: , Rfl:     simvastatin (ZOCOR) 20 MG tablet, Take 1 tablet by mouth nightly, Disp: 90 tablet, Rfl: 3    bisoprolol (ZEBETA) 5 MG tablet, Take 0.5 tablets by mouth daily, Disp: 45 tablet, Rfl: 3    docusate sodium (COLACE) 100 MG capsule, Take 1 capsule by mouth 2 times daily as needed for Constipation, Disp: 60 capsule, Rfl: 2    medical marijuana, Take by mouth as needed. , Disp: , Rfl:     ondansetron (ZOFRAN) 8 MG tablet, Take 1 tablet by mouth every 8 hours as needed for Nausea or Vomiting, Disp: 30 tablet, Rfl: 0    Cholecalciferol (VITAMIN D3) 5000 units TABS, Take 1 tablet by mouth daily (with breakfast), Disp: 90 tablet, Rfl: 1    cloNIDine (CATAPRES-TTS-2) 0.2 MG/24HR PTWK, Place 1 patch onto the skin every 7 days, Disp: 4 patch, Rfl: 3    Family History   Problem Relation Age of Onset    Diabetes Father     Substance Abuse Daughter     Substance Abuse Daughter     Mental Illness Daughter        Social History     Socioeconomic History    Marital status:      Spouse name: Not on file    Number of children: Not on file    Years of education: Not on file    Highest education level: Not on file   Occupational History    Not on file   Social Needs    Financial resource strain: Not on file    Food insecurity     Worry: Not on file     Inability: Not on file    Transportation needs     Medical: Not on file     Non-medical: Not on file   Tobacco Use    Smoking status: Current Every Day Smoker     Packs/day: 1.00     Types: Cigarettes    Smokeless tobacco: Never Used    Tobacco comment: almost off cigarettes, but now vaping. Substance and Sexual Activity    Alcohol use: No     Alcohol/week: 0.0 standard drinks     Comment: denies any heavy use.  Drug use: Yes     Types: Marijuana     Comment: Medical marijuana card verified. once a day    Sexual activity: Not Currently     Partners: Male     Comment: spouse    Lifestyle    Physical activity     Days per week: Not on file     Minutes per session: Not on file    Stress: Not on file   Relationships    Social connections     Talks on phone: Not on file     Gets together: Not on file     Attends Rastafari service: Not on file     Active member of club or organization: Not on file     Attends meetings of clubs or organizations: Not on file     Relationship status: Not on file    Intimate partner violence     Fear of current or ex partner: Not on file     Emotionally abused: Not on file     Physically abused: Not on file     Forced sexual activity: Not on file   Other Topics Concern    Not on file   Social History Narrative    Not on file       Review of Systems:  Review of Systems   Constitution: Negative for fever. Cardiovascular: Negative for chest pain. Respiratory: Negative for cough. Musculoskeletal: Positive for back pain and neck pain. Physical Exam:  BP (!) 147/80   Pulse 67   Temp 97.4 °F (36.3 °C) (Temporal)   Wt 220 lb (99.8 kg)   BMI 34.46 kg/m²     Physical Exam  Vitals signs reviewed. Constitutional:       General: She is awake. Appearance: Normal appearance. She is not ill-appearing or diaphoretic. HENT:      Head: Normocephalic and atraumatic. Right Ear: External ear normal.      Left Ear: External ear normal.   Eyes:      General: Lids are normal.         Right eye: No discharge. Left eye: No discharge.       Conjunctiva/sclera: Conjunctivae normal.   Neck:      Musculoskeletal: Normal range of motion. Thyroid: No thyromegaly. Trachea: No tracheal deviation. Pulmonary:      Effort: Pulmonary effort is normal. No respiratory distress. Breath sounds: No stridor. Musculoskeletal:      Cervical back: She exhibits tenderness. She exhibits no swelling and no edema. Lumbar back: She exhibits tenderness. She exhibits no edema and no deformity. Skin:     General: Skin is warm and dry. Comments: Visualized skin with no rash   Neurological:      Mental Status: She is alert and oriented to person, place, and time. Gait: Gait abnormal.      Comments: Walks with a cane   Psychiatric:         Attention and Perception: Attention and perception normal.         Mood and Affect: Mood and affect normal.         Behavior: Behavior normal.         Record/Diagnostics Review:    As above, I did review the imaging      Assessment:  1. Cervicalgia    2. Postlaminectomy syndrome, lumbar region    3. Cervical radiculopathy    4. Chronic pain of both knees        Treatment Plan:  DISCUSSION: Treatment options discussed with patient and all questions answered to patient's satisfaction. OARRS Review: Reviewed and acceptable for medications prescribed. TREATMENT OPTIONS:     Discussed different treatment options including continued conservative care such as physical therapy, chiropractic care, acupuncture. Discussed different interventional options such as epidural steroids or medial branch blocks. Also discussed neuromodulation in the form of spinal cord stimulation. Also discussed surgical evaluation. At this point I believe updated imaging is indicated so we can evaluate pathology and guide treatment plan. She was previously discharged from pain management physician Dr. Erika Good last year and she saw my colleague Dr. Vera Garcia earlier and he did not recommend chronic opioid therapy at that time.   I did discuss with her that chronic opiate therapy would not be part of my plan for

## 2020-09-10 ENCOUNTER — INITIAL CONSULT (OUTPATIENT)
Dept: PAIN MANAGEMENT | Age: 60
End: 2020-09-10
Payer: COMMERCIAL

## 2020-09-10 VITALS
HEART RATE: 67 BPM | WEIGHT: 220 LBS | TEMPERATURE: 97.4 F | SYSTOLIC BLOOD PRESSURE: 147 MMHG | BODY MASS INDEX: 34.46 KG/M2 | DIASTOLIC BLOOD PRESSURE: 80 MMHG

## 2020-09-10 PROCEDURE — 99214 OFFICE O/P EST MOD 30 MIN: CPT | Performed by: PAIN MEDICINE

## 2020-09-10 RX ORDER — METHOCARBAMOL 500 MG/1
500 TABLET, FILM COATED ORAL 3 TIMES DAILY
Qty: 90 TABLET | Refills: 1 | Status: SHIPPED | OUTPATIENT
Start: 2020-09-10 | End: 2020-10-13 | Stop reason: ALTCHOICE

## 2020-09-10 ASSESSMENT — ENCOUNTER SYMPTOMS: COUGH: 0

## 2020-09-10 NOTE — TELEPHONE ENCOUNTER
Shaan Bellamy is calling to request a refill on the following medication(s):    Last Visit Date (If Applicable):  6/8/2767    Next Visit Date:    Visit date not found    Medication Request:  Requested Prescriptions     Pending Prescriptions Disp Refills    methocarbamol (ROBAXIN) 500 MG tablet 90 tablet 1     Sig: Take 1 tablet by mouth 3 times daily

## 2020-09-18 ENCOUNTER — TELEPHONE (OUTPATIENT)
Dept: FAMILY MEDICINE CLINIC | Age: 60
End: 2020-09-18

## 2020-09-18 NOTE — TELEPHONE ENCOUNTER
Pt states she seen Dr. Aris Soni and was unsatisfied with visit and requests a referral to [address] [place] [address] for Pain mgmt.    [phone] (P)  [phone] (F)

## 2020-09-19 ENCOUNTER — APPOINTMENT (OUTPATIENT)
Dept: CT IMAGING | Age: 60
End: 2020-09-19
Payer: COMMERCIAL

## 2020-09-19 ENCOUNTER — HOSPITAL ENCOUNTER (EMERGENCY)
Age: 60
Discharge: HOME | End: 2020-09-19
Payer: COMMERCIAL

## 2020-09-19 VITALS
BODY MASS INDEX: 34.53 KG/M2 | SYSTOLIC BLOOD PRESSURE: 157 MMHG | WEIGHT: 220 LBS | TEMPERATURE: 98.6 F | DIASTOLIC BLOOD PRESSURE: 77 MMHG | RESPIRATION RATE: 16 BRPM | HEIGHT: 67 IN | HEART RATE: 61 BPM | OXYGEN SATURATION: 96 %

## 2020-09-19 LAB
-: ABNORMAL
ABSOLUTE EOS #: 0.2 K/UL (ref 0–0.44)
ABSOLUTE IMMATURE GRANULOCYTE: 0.05 K/UL (ref 0–0.3)
ABSOLUTE LYMPH #: 2.98 K/UL (ref 1.1–3.7)
ABSOLUTE MONO #: 0.47 K/UL (ref 0.1–1.2)
ALBUMIN SERPL-MCNC: 4.6 G/DL (ref 3.5–5.2)
ALBUMIN/GLOBULIN RATIO: ABNORMAL (ref 1–2.5)
ALP BLD-CCNC: 132 U/L (ref 35–104)
ALT SERPL-CCNC: 12 U/L (ref 5–33)
AMORPHOUS: ABNORMAL
ANION GAP SERPL CALCULATED.3IONS-SCNC: 12 MMOL/L (ref 9–17)
AST SERPL-CCNC: 17 U/L
BACTERIA: ABNORMAL
BASOPHILS # BLD: 1 % (ref 0–2)
BASOPHILS ABSOLUTE: 0.04 K/UL (ref 0–0.2)
BILIRUB SERPL-MCNC: 0.18 MG/DL (ref 0.3–1.2)
BILIRUBIN DIRECT: <0.08 MG/DL
BILIRUBIN URINE: NEGATIVE
BILIRUBIN, INDIRECT: ABNORMAL MG/DL (ref 0–1)
BUN BLDV-MCNC: 9 MG/DL (ref 6–20)
BUN/CREAT BLD: 11 (ref 9–20)
CALCIUM SERPL-MCNC: 9.9 MG/DL (ref 8.6–10.4)
CASTS UA: ABNORMAL /LPF
CHLORIDE BLD-SCNC: 103 MMOL/L (ref 98–107)
CO2: 27 MMOL/L (ref 20–31)
COLOR: YELLOW
COMMENT UA: ABNORMAL
CREAT SERPL-MCNC: 0.83 MG/DL (ref 0.5–0.9)
CRYSTALS, UA: ABNORMAL /HPF
DIFFERENTIAL TYPE: ABNORMAL
EOSINOPHILS RELATIVE PERCENT: 2 % (ref 1–4)
EPITHELIAL CELLS UA: ABNORMAL /HPF (ref 0–5)
GFR AFRICAN AMERICAN: >60 ML/MIN
GFR NON-AFRICAN AMERICAN: >60 ML/MIN
GFR SERPL CREATININE-BSD FRML MDRD: ABNORMAL ML/MIN/{1.73_M2}
GFR SERPL CREATININE-BSD FRML MDRD: ABNORMAL ML/MIN/{1.73_M2}
GLOBULIN: ABNORMAL G/DL (ref 1.5–3.8)
GLUCOSE BLD-MCNC: 109 MG/DL (ref 70–99)
GLUCOSE URINE: NEGATIVE
HCT VFR BLD CALC: 45.7 % (ref 36.3–47.1)
HEMOGLOBIN: 15 G/DL (ref 11.9–15.1)
IMMATURE GRANULOCYTES: 1 %
KETONES, URINE: NEGATIVE
LEUKOCYTE ESTERASE, URINE: ABNORMAL
LIPASE: 42 U/L (ref 13–60)
LYMPHOCYTES # BLD: 34 % (ref 24–43)
MCH RBC QN AUTO: 31.6 PG (ref 25.2–33.5)
MCHC RBC AUTO-ENTMCNC: 32.8 G/DL (ref 28.4–34.8)
MCV RBC AUTO: 96.4 FL (ref 82.6–102.9)
MONOCYTES # BLD: 5 % (ref 3–12)
MUCUS: ABNORMAL
NITRITE, URINE: NEGATIVE
NRBC AUTOMATED: 0 PER 100 WBC
OTHER OBSERVATIONS UA: ABNORMAL
PDW BLD-RTO: 13 % (ref 11.8–14.4)
PH UA: 6 (ref 5–8)
PLATELET # BLD: 193 K/UL (ref 138–453)
PLATELET ESTIMATE: ABNORMAL
PMV BLD AUTO: 10.7 FL (ref 8.1–13.5)
POTASSIUM SERPL-SCNC: 4 MMOL/L (ref 3.7–5.3)
PROTEIN UA: NEGATIVE
RBC # BLD: 4.74 M/UL (ref 3.95–5.11)
RBC # BLD: ABNORMAL 10*6/UL
RBC UA: ABNORMAL /HPF (ref 0–2)
RENAL EPITHELIAL, UA: ABNORMAL /HPF
SEG NEUTROPHILS: 57 % (ref 36–65)
SEGMENTED NEUTROPHILS ABSOLUTE COUNT: 4.92 K/UL (ref 1.5–8.1)
SODIUM BLD-SCNC: 142 MMOL/L (ref 135–144)
SPECIFIC GRAVITY UA: 1.01 (ref 1–1.03)
TOTAL PROTEIN: 7.5 G/DL (ref 6.4–8.3)
TRICHOMONAS: ABNORMAL
TURBIDITY: CLEAR
URINE HGB: ABNORMAL
UROBILINOGEN, URINE: NORMAL
WBC # BLD: 8.7 K/UL (ref 3.5–11.3)
WBC # BLD: ABNORMAL 10*3/UL
WBC UA: ABNORMAL /HPF (ref 0–5)
YEAST: ABNORMAL

## 2020-09-19 PROCEDURE — 6360000002 HC RX W HCPCS: Performed by: NURSE PRACTITIONER

## 2020-09-19 PROCEDURE — 80076 HEPATIC FUNCTION PANEL: CPT

## 2020-09-19 PROCEDURE — 96374 THER/PROPH/DIAG INJ IV PUSH: CPT

## 2020-09-19 PROCEDURE — 81001 URINALYSIS AUTO W/SCOPE: CPT

## 2020-09-19 PROCEDURE — 99284 EMERGENCY DEPT VISIT MOD MDM: CPT

## 2020-09-19 PROCEDURE — 83690 ASSAY OF LIPASE: CPT

## 2020-09-19 PROCEDURE — 2580000003 HC RX 258: Performed by: NURSE PRACTITIONER

## 2020-09-19 PROCEDURE — 74177 CT ABD & PELVIS W/CONTRAST: CPT

## 2020-09-19 PROCEDURE — 6360000004 HC RX CONTRAST MEDICATION: Performed by: NURSE PRACTITIONER

## 2020-09-19 PROCEDURE — 70450 CT HEAD/BRAIN W/O DYE: CPT

## 2020-09-19 PROCEDURE — 85025 COMPLETE CBC W/AUTO DIFF WBC: CPT

## 2020-09-19 PROCEDURE — 80048 BASIC METABOLIC PNL TOTAL CA: CPT

## 2020-09-19 PROCEDURE — 96375 TX/PRO/DX INJ NEW DRUG ADDON: CPT

## 2020-09-19 PROCEDURE — 96376 TX/PRO/DX INJ SAME DRUG ADON: CPT

## 2020-09-19 RX ORDER — MORPHINE SULFATE 4 MG/ML
4 INJECTION, SOLUTION INTRAMUSCULAR; INTRAVENOUS ONCE
Status: COMPLETED | OUTPATIENT
Start: 2020-09-19 | End: 2020-09-19

## 2020-09-19 RX ORDER — SODIUM CHLORIDE 0.9 % (FLUSH) 0.9 %
10 SYRINGE (ML) INJECTION ONCE
Status: COMPLETED | OUTPATIENT
Start: 2020-09-19 | End: 2020-09-19

## 2020-09-19 RX ORDER — KETOROLAC TROMETHAMINE 30 MG/ML
30 INJECTION, SOLUTION INTRAMUSCULAR; INTRAVENOUS ONCE
Status: COMPLETED | OUTPATIENT
Start: 2020-09-19 | End: 2020-09-19

## 2020-09-19 RX ORDER — 0.9 % SODIUM CHLORIDE 0.9 %
1000 INTRAVENOUS SOLUTION INTRAVENOUS ONCE
Status: COMPLETED | OUTPATIENT
Start: 2020-09-19 | End: 2020-09-19

## 2020-09-19 RX ORDER — MORPHINE SULFATE 2 MG/ML
2 INJECTION, SOLUTION INTRAMUSCULAR; INTRAVENOUS ONCE
Status: COMPLETED | OUTPATIENT
Start: 2020-09-19 | End: 2020-09-19

## 2020-09-19 RX ORDER — ONDANSETRON 4 MG/1
4 TABLET, ORALLY DISINTEGRATING ORAL EVERY 8 HOURS PRN
Qty: 20 TABLET | Refills: 0 | Status: SHIPPED | OUTPATIENT
Start: 2020-09-19 | End: 2020-10-13 | Stop reason: ALTCHOICE

## 2020-09-19 RX ORDER — DICYCLOMINE HYDROCHLORIDE 10 MG/1
10 CAPSULE ORAL EVERY 6 HOURS PRN
Qty: 20 CAPSULE | Refills: 0 | Status: SHIPPED | OUTPATIENT
Start: 2020-09-19 | End: 2020-10-13 | Stop reason: ALTCHOICE

## 2020-09-19 RX ORDER — 0.9 % SODIUM CHLORIDE 0.9 %
80 INTRAVENOUS SOLUTION INTRAVENOUS ONCE
Status: COMPLETED | OUTPATIENT
Start: 2020-09-19 | End: 2020-09-19

## 2020-09-19 RX ORDER — ONDANSETRON 2 MG/ML
4 INJECTION INTRAMUSCULAR; INTRAVENOUS ONCE
Status: COMPLETED | OUTPATIENT
Start: 2020-09-19 | End: 2020-09-19

## 2020-09-19 RX ADMIN — KETOROLAC TROMETHAMINE 30 MG: 30 INJECTION, SOLUTION INTRAMUSCULAR at 22:38

## 2020-09-19 RX ADMIN — MORPHINE SULFATE 4 MG: 4 INJECTION, SOLUTION INTRAMUSCULAR; INTRAVENOUS at 20:31

## 2020-09-19 RX ADMIN — SODIUM CHLORIDE 1000 ML: 9 INJECTION, SOLUTION INTRAVENOUS at 20:31

## 2020-09-19 RX ADMIN — IOPAMIDOL 75 ML: 755 INJECTION, SOLUTION INTRAVENOUS at 22:10

## 2020-09-19 RX ADMIN — Medication 10 ML: at 22:10

## 2020-09-19 RX ADMIN — ONDANSETRON 4 MG: 2 INJECTION INTRAMUSCULAR; INTRAVENOUS at 20:31

## 2020-09-19 RX ADMIN — MORPHINE SULFATE 2 MG: 2 INJECTION, SOLUTION INTRAMUSCULAR; INTRAVENOUS at 22:38

## 2020-09-19 RX ADMIN — SODIUM CHLORIDE 80 ML: 9 INJECTION, SOLUTION INTRAVENOUS at 22:10

## 2020-09-19 ASSESSMENT — PAIN SCALES - GENERAL
PAINLEVEL_OUTOF10: 10

## 2020-09-20 ASSESSMENT — ENCOUNTER SYMPTOMS
WHEEZING: 0
COUGH: 0
SHORTNESS OF BREATH: 0
NAUSEA: 1
ABDOMINAL PAIN: 1
CONSTIPATION: 0
SORE THROAT: 0
SINUS PRESSURE: 0
RHINORRHEA: 0
DIARRHEA: 0
VOMITING: 0
COLOR CHANGE: 0

## 2020-09-20 NOTE — ED PROVIDER NOTES
[facility] ED  eMERGENCY dEPARTMENT eNCOUnter      Pt Name: Napoleon Coe  MRN: [id number]  [place] 1960  Date of evaluation: 9/19/2020  Provider: Terrell Wheeler NP, KENNY Hannah [address]26       Chief Complaint   Patient presents with    Migraine    Abdominal Pain         HISTORY OF PRESENT ILLNESS  (Location/Symptom, Timing/Onset, Context/Setting, Quality, Duration, Modifying Factors, Severity.)   Napoleon Coe is a 61 y.o. female who presents to the emergency department by private vehicle for evaluation of some left lower quadrant abdominal pain that started today. She also has a headache. She states that this is her typical migraine headache and denies that this is the worst headache of her life. She states that she is nauseated without any vomiting. She describes his pain as a sharp stabbing pain to the left lower abdomen. She rates the pain a 10 on a 0-to-10 scale. Nursing Notes were reviewed.     ALLERGIES     Penicillins    CURRENT MEDICATIONS       Discharge Medication List as of 9/19/2020 10:36 PM      CONTINUE these medications which have NOT CHANGED    Details   methocarbamol (ROBAXIN) 500 MG tablet Take 1 tablet by mouth 3 times daily, Disp-90 tablet,R-1Normal      gabapentin (NEURONTIN) 600 MG tablet TAKE 1 TABLET TWICE A DAY, Disp-90 tablet,R-8Normal      !! prazosin (MINIPRESS) 1 MG capsule Take 1 capsule by mouth nightly, Disp-30 capsule,R-5Normal      brexpiprazole (REXULTI) 1 MG TABS tablet Take 1 tablet by mouth daily, Disp-30 tablet,R-5Normal      !! prazosin (MINIPRESS) 1 MG capsule Historical Med      traZODone (DESYREL) 150 MG tablet TAKE  TABLETS NIGHTLY, Disp-90 tablet, R-3Normal      omeprazole (PRILOSEC) 40 MG delayed release capsule TAKE 1 CAPSULE DAILY, Disp-90 capsule, R-3Normal      albuterol sulfate HFA (VENTOLIN HFA) 108 (90 Base) MCG/ACT inhaler Inhale 2 puffs into the lungs 4 times daily as needed for Wheezing, Disp-3 Inhaler, R-1Normal      !! sertraline (ZOLOFT) 50 MG tablet TAKE 3 TABLETS DAILY, Disp-90 tablet, R-12Normal      !! tiZANidine (ZANAFLEX) 2 MG tablet TAKE 2 TABLETS EVERY 8 HOURS AS NEEDED FOR MUSCLE SPASM (SEDATION WARNING), Disp-180 tablet, R-12Normal      magnesium 30 MG tablet Take 200 mg by mouth 2 times dailyHistorical Med      buPROPion (WELLBUTRIN XL) 300 MG extended release tablet Take 1 tablet by mouth every morning, Disp-90 tablet, R-3Normal      varenicline (CHANTIX CONTINUING MONTH DIMITRIS) 1 MG tablet Take 1 tablet by mouth 2 times daily, Disp-180 tablet, R-1Normal      acetaminophen (TYLENOL) 500 MG tablet Take 2 tablets by mouth every 6 hours as needed for Pain, Disp-120 tablet, R-3NO PRINT      !! sertraline (ZOLOFT) 100 MG tablet Take 1.5 tablets by mouth daily TAKE 1 TABLET DAILY, Disp-60 tablet, R-0Normal      topiramate (TOPAMAX) 200 MG tablet Take 200 mg by mouth 2 times dailyHistorical Med      !! tiZANidine (ZANAFLEX) 2 MG tablet Take 2 mg by mouth every 8 hours as needed (2 tabs every 8hrs prn)Historical Med      cloNIDine (CATAPRES-TTS-2) 0.2 MG/24HR PTWK Place 1 patch onto the skin every 7 days, Disp-4 patch, R-3Normal      simvastatin (ZOCOR) 20 MG tablet Take 1 tablet by mouth nightly, Disp-90 tablet, R-3Normal      bisoprolol (ZEBETA) 5 MG tablet Take 0.5 tablets by mouth daily, Disp-45 tablet, R-3Normal      docusate sodium (COLACE) 100 MG capsule Take 1 capsule by mouth 2 times daily as needed for Constipation, Disp-60 capsule, R-2Normal      medical marijuana Take by mouth as needed. Historical Med      ondansetron (ZOFRAN) 8 MG tablet Take 1 tablet by mouth every 8 hours as needed for Nausea or Vomiting, Disp-30 tablet, R-0Normal      Cholecalciferol (VITAMIN D3) 5000 units TABS Take 1 tablet by mouth daily (with breakfast), Disp-90 tablet, R-1Normal       !! - Potential duplicate medications found. Please discuss with provider.           PAST MEDICAL HISTORY         Diagnosis Date    Arthritis     DDD    Depression     Generalized anxiety disorder 2016    Hyperlipidemia     Methadone dependence ([facility] [address] 2016    Tobacco use disorder, continuous 2016       SURGICAL HISTORY           Procedure Laterality Date    ANKLE FRACTURE SURGERY Left     APPENDECTOMY      BACK SURGERY      fusion, hardware. Dr. Alex Gonzalez    COLONOSCOPY  2017    HYPERPLASTIC POLYP       COLONOSCOPY  2017    ADENOMATOUS POLYP.  DILATION AND CURETTAGE OF UTERUS      FOOT SURGERY      KNEE SURGERY Bilateral     [place] COLONOSCOPY W/BIOPSY SINGLE/MULTIPLE N/A 2017    COLONOSCOPY WITH BIOPSY performed by Rosana Robbins MD at . [address] W/BIOPSY SINGLE/MULTIPLE N/A 2017    COLONOSCOPY WITH BIOPSY performed by Rosana Robbins MD at [address] [facility] EGD TRANSORAL BIOPSY SINGLE/MULTIPLE N/A 2017    EGD BIOPSY performed by Rosana Robbins MD at [facility] ENDOSCOPY  2017         FAMILY HISTORY           Problem Relation Age of Onset    Diabetes Father     Substance Abuse Daughter     Substance Abuse Daughter     Mental Illness Daughter      Family Status   Relation Name Status    Mother   at age 58        unknown   [employer] Father   at age 72        alcoholism, DM, and believe CVA    Sister  Alive    MGM      MGF      PGM      PGF      Sister  Alive    Sister   at age 48        MS complications, in Nursing home.  Jessika  Alive    Jessika  Alive    Jessika  Alive        SOCIAL HISTORY      reports that she has been smoking cigarettes. She has been smoking about 1.00 pack per day. She has never used smokeless tobacco. She reports current drug use. Drug: Marijuana. She reports that she does not drink alcohol.     REVIEW OF SYSTEMS    (2-9 systems for level 4, 10 or more for level 5)     Review of Systems   Constitutional: Negative for chills, fever and unexpected weight change. HENT: Negative for congestion, rhinorrhea, sinus pressure and sore throat. Respiratory: Negative for cough, shortness of breath and wheezing. Cardiovascular: Negative for chest pain and palpitations. Gastrointestinal: Positive for abdominal pain and nausea. Negative for constipation, diarrhea and vomiting. Genitourinary: Negative for dysuria and hematuria. Musculoskeletal: Negative for arthralgias and myalgias. Skin: Negative for color change and rash. Neurological: Positive for headaches. Negative for dizziness and weakness. Hematological: Negative for adenopathy. All other systems reviewed and are negative. Except as noted above the remainder of the review of systems was reviewed and negative. PHYSICAL EXAM    (up to 7 for level 4, 8 or more for level 5)     ED Triage Vitals [09/19/20 1952]   BP Temp Temp Source Pulse Resp SpO2 Height Weight   (!) 157/77 98.6 °F (37 °C) Oral 61 16 96 % 5' 7\" (1.702 m) 220 lb (99.8 kg)       Physical Exam  Vitals signs reviewed. Constitutional:       Appearance: She is well-developed. HENT:      Head: Normocephalic and atraumatic. Eyes:      Conjunctiva/sclera: Conjunctivae normal.      Pupils: Pupils are equal, round, and reactive to light. Neck:      Musculoskeletal: Normal range of motion and neck supple. Cardiovascular:      Rate and Rhythm: Normal rate and regular rhythm. Pulmonary:      Effort: Pulmonary effort is normal. No respiratory distress. Breath sounds: Normal breath sounds. No stridor. Abdominal:      General: Bowel sounds are normal.      Palpations: Abdomen is soft. Tenderness: There is abdominal tenderness in the left lower quadrant. Musculoskeletal: Normal range of motion. Lymphadenopathy:      Cervical: No cervical adenopathy. Skin:     General: Skin is warm and dry. Findings: No rash. Neurological:      Mental Status: She is alert and oriented to person, place, and time. RADIOLOGY:   Non-plain film images such as CT, Ultrasound and MRI are read by the radiologist. [facility] radiographic images are visualized and preliminarily interpreted by the emergency physician with the below findings:    Ct Head Wo Contrast    Result Date: 9/19/2020  EXAMINATION: CT OF THE HEAD WITHOUT CONTRAST  9/19/2020 9:41 pm TECHNIQUE: CT of the head was performed without the administration of intravenous contrast. Dose modulation, iterative reconstruction, and/or weight based adjustment of the mA/kV was utilized to reduce the radiation dose to as low as reasonably achievable. COMPARISON: 07/29/2019 HISTORY: ORDERING SYSTEM PROVIDED HISTORY: headache TECHNOLOGIST PROVIDED HISTORY: headache Reason for Exam: headache Acuity: Acute Type of Exam: Initial FINDINGS: BRAIN/VENTRICLES: There is no acute intracranial hemorrhage, mass effect or midline shift. No abnormal extra-axial fluid collection. The gray-white differentiation is maintained without evidence of an acute infarct. There is no evidence of hydrocephalus. ORBITS: The visualized portion of the orbits demonstrate no acute abnormality. SINUSES: The visualized paranasal sinuses and mastoid air cells demonstrate no acute abnormality. Inferior right nasal turbinate hypertrophy. SOFT TISSUES/SKULL:  No acute abnormality of the visualized skull or soft tissues. No acute intracranial abnormality. Ct Abdomen Pelvis W Iv Contrast    Result Date: 9/19/2020  EXAMINATION: CT OF THE ABDOMEN AND PELVIS WITH CONTRAST 9/19/2020 9:41 pm TECHNIQUE: CT of the abdomen and pelvis was performed with the administration of intravenous contrast. Multiplanar reformatted images are provided for review. Dose modulation, iterative reconstruction, and/or weight based adjustment of the mA/kV was utilized to reduce the radiation dose to as low as reasonably achievable.  COMPARISON: 07/28/2019 HISTORY: ORDERING SYSTEM PROVIDED HISTORY: Pain TECHNOLOGIST PROVIDED HISTORY: IV Only Contrast Pain Reason for Exam: abd pain Acuity: Acute Type of Exam: Initial Additional signs and symptoms: diarrhea Relevant Medical/Surgical History: diverticulitis, Appy FINDINGS: Lower Chest: The lung bases are clear and the heart size is normal. Organs: The liver, spleen, pancreas, adrenal glands and kidneys are normal. There are no calcified gallstones. No pericholecystic fluid or fat stranding. GI/Bowel: Sigmoid colon diverticulosis. No evidence of diverticulitis. Bowel loops are generally empty. Small amount of contrast in the right colon and distal ileal loops. No bowel obstruction. The appendix is surgically absent. Pelvis: Urinary bladder is nearly empty and grossly. Uterus and ovaries are mildly atrophic. Peritoneum/Retroperitoneum: There is no adenopathy, free air or free fluid. Bones/Soft Tissues: Status post fusion surgery at L4-L5 and L5-S1. Posterior wide laminectomy. No acute bone finding. No acute finding in the abdomen or pelvis. Mild sigmoid colon diverticulosis with no evidence of diverticulitis. Interpretation per the Radiologist below, if available at the time of this note:    CT ABDOMEN PELVIS W IV CONTRAST   Final Result   No acute finding in the abdomen or pelvis. Mild sigmoid colon diverticulosis with no evidence of diverticulitis. CT HEAD WO CONTRAST   Final Result   No acute intracranial abnormality.                  LABS:  Labs Reviewed   CBC WITH AUTO DIFFERENTIAL - Abnormal; Notable for the following components:       Result Value    Immature Granulocytes 1 (*)     All other components within normal limits   BASIC METABOLIC PANEL - Abnormal; Notable for the following components:    Glucose 109 (*)     All other components within normal limits   HEPATIC FUNCTION PANEL - Abnormal; Notable for the following components:    Alkaline Phosphatase 132 (*)     Total Bilirubin 0.18 (*)     All other components within normal limits   URINE RT REFLEX a visit         DISCHARGE MEDICATIONS:     Discharge Medication List as of 9/19/2020 10:36 PM      START taking these medications    Details   ondansetron (ZOFRAN ODT) 4 MG disintegrating tablet Take 1 tablet by mouth every 8 hours as needed for Nausea, Disp-20 tablet,R-0Print                 (Please note that portions of this note were completed with a voice recognition program.  Efforts were made to edit the dictations but occasionally words are mis-transcribed.)    [address] [facility] NP, KENNY - CNP  Certified Nurse Practitioner          KENNY Schneider CNP  09/20/20 0125       Maria Esther Sheets MD  09/22/20 [id number]

## 2020-09-21 ENCOUNTER — TELEPHONE (OUTPATIENT)
Dept: FAMILY MEDICINE CLINIC | Age: 60
End: 2020-09-21

## 2020-09-21 NOTE — TELEPHONE ENCOUNTER
Pt states she has a pain that is going from abdomen radiating up to her chest on the left side and the pain is getting worse, pain started a few weeks ago. States it feels like a knife stabbing her and cutting her up her chest. Pt also c/o more frequent migraine headaches. Pt was in the ER 9/19/20.

## 2020-09-22 ENCOUNTER — VIRTUAL VISIT (OUTPATIENT)
Dept: FAMILY MEDICINE CLINIC | Age: 60
End: 2020-09-22
Payer: COMMERCIAL

## 2020-09-22 PROCEDURE — 99442 PR PHYS/QHP TELEPHONE EVALUATION 11-20 MIN: CPT | Performed by: FAMILY MEDICINE

## 2020-09-22 ASSESSMENT — PATIENT HEALTH QUESTIONNAIRE - PHQ9
1. LITTLE INTEREST OR PLEASURE IN DOING THINGS: 0
SUM OF ALL RESPONSES TO PHQ QUESTIONS 1-9: 0
2. FEELING DOWN, DEPRESSED OR HOPELESS: 0
SUM OF ALL RESPONSES TO PHQ9 QUESTIONS 1 & 2: 0
SUM OF ALL RESPONSES TO PHQ QUESTIONS 1-9: 0

## 2020-09-22 NOTE — PROGRESS NOTES
Amador Lewis is a 61 y.o. female evaluated via telephone on 9/22/2020. Consent:  She and/or health care decision maker is aware that that she may receive a bill for this telephone service, depending on her insurance coverage, and has provided verbal consent to proceed: Yes      Documentation:  I communicated with the patient and/or health care decision maker about patient is being seen via phone call visit stating that she has had severe headache body aches abdominal pain going from her groin to her chest for the last several days it is unrelenting her pain is miserable and she is does not know what to do she did go to the emergency room the other day but apparently did not find any thing specific gave her some pain injection and sent her home which she feels like she is much worse as of today. Details of this discussion including any medical advice provided: Given that she is in so much pain I suggested she return to the emergency room I also think she needs to get COVID tested      I affirm this is a Patient Initiated Episode with a Patient who has not had a related appointment within my department in the past 7 days or scheduled within the next 24 hours.     Patient identification was verified at the start of the visit: Yes    Total Time: minutes: 11-20 minutes    Note: not billable if this call serves to triage the patient into an appointment for the relevant concern      Ailyn Aguilar

## 2020-09-23 ENCOUNTER — TELEPHONE (OUTPATIENT)
Dept: FAMILY MEDICINE CLINIC | Age: 60
End: 2020-09-23

## 2020-09-23 NOTE — TELEPHONE ENCOUNTER
Pts  asks if they're suppose to complete additional test done today. Pt is still having abdominal pain.

## 2020-10-08 RX ORDER — SERTRALINE HYDROCHLORIDE 100 MG/1
150 TABLET, FILM COATED ORAL DAILY
Qty: 60 TABLET | Refills: 0 | Status: SHIPPED | OUTPATIENT
Start: 2020-10-08 | End: 2020-12-01 | Stop reason: SDUPTHER

## 2020-10-08 RX ORDER — TIZANIDINE 2 MG/1
TABLET ORAL
Qty: 60 TABLET | Refills: 5 | Status: SHIPPED | OUTPATIENT
Start: 2020-10-08 | End: 2020-10-13 | Stop reason: ALTCHOICE

## 2020-10-13 ENCOUNTER — OFFICE VISIT (OUTPATIENT)
Dept: FAMILY MEDICINE CLINIC | Age: 60
End: 2020-10-13
Payer: COMMERCIAL

## 2020-10-13 VITALS
WEIGHT: 234.2 LBS | BODY MASS INDEX: 36.76 KG/M2 | OXYGEN SATURATION: 96 % | HEART RATE: 62 BPM | TEMPERATURE: 97.2 F | HEIGHT: 67 IN | SYSTOLIC BLOOD PRESSURE: 120 MMHG | DIASTOLIC BLOOD PRESSURE: 71 MMHG

## 2020-10-13 PROBLEM — Z86.010 HISTORY OF COLONOSCOPY WITH POLYPECTOMY: Status: ACTIVE | Noted: 2020-10-13

## 2020-10-13 PROBLEM — E66.9 CLASS 2 OBESITY IN ADULT: Status: ACTIVE | Noted: 2020-10-13

## 2020-10-13 PROBLEM — I60.9 SUBARACHNOID HEMORRHAGE (HCC): Status: RESOLVED | Noted: 2019-07-28 | Resolved: 2020-10-13

## 2020-10-13 PROBLEM — D36.9 ADENOMATOUS POLYP: Status: RESOLVED | Noted: 2017-05-01 | Resolved: 2020-10-13

## 2020-10-13 PROBLEM — K57.30 DIVERTICULOSIS OF COLON: Status: ACTIVE | Noted: 2020-10-13

## 2020-10-13 PROBLEM — Z79.891 CHRONIC PRESCRIPTION OPIATE USE: Status: RESOLVED | Noted: 2018-10-11 | Resolved: 2020-10-13

## 2020-10-13 PROBLEM — Z98.890 HISTORY OF COLONOSCOPY WITH POLYPECTOMY: Status: ACTIVE | Noted: 2020-10-13

## 2020-10-13 PROBLEM — Z98.890 HISTORY OF BACK SURGERY: Status: ACTIVE | Noted: 2020-10-13

## 2020-10-13 PROBLEM — F39 MOOD DISORDER (HCC): Status: ACTIVE | Noted: 2020-10-13

## 2020-10-13 PROBLEM — F12.10 MARIJUANA ABUSE, CONTINUOUS: Status: ACTIVE | Noted: 2020-10-13

## 2020-10-13 PROBLEM — F43.10 PTSD (POST-TRAUMATIC STRESS DISORDER): Status: ACTIVE | Noted: 2020-10-13

## 2020-10-13 PROBLEM — K21.9 GERD (GASTROESOPHAGEAL REFLUX DISEASE): Status: ACTIVE | Noted: 2020-10-13

## 2020-10-13 PROCEDURE — 90686 IIV4 VACC NO PRSV 0.5 ML IM: CPT | Performed by: FAMILY MEDICINE

## 2020-10-13 PROCEDURE — 90472 IMMUNIZATION ADMIN EACH ADD: CPT | Performed by: FAMILY MEDICINE

## 2020-10-13 PROCEDURE — 90732 PPSV23 VACC 2 YRS+ SUBQ/IM: CPT | Performed by: FAMILY MEDICINE

## 2020-10-13 PROCEDURE — 90471 IMMUNIZATION ADMIN: CPT | Performed by: FAMILY MEDICINE

## 2020-10-13 PROCEDURE — 99215 OFFICE O/P EST HI 40 MIN: CPT | Performed by: FAMILY MEDICINE

## 2020-10-13 ASSESSMENT — PATIENT HEALTH QUESTIONNAIRE - PHQ9
1. LITTLE INTEREST OR PLEASURE IN DOING THINGS: 0
SUM OF ALL RESPONSES TO PHQ9 QUESTIONS 1 & 2: 0
SUM OF ALL RESPONSES TO PHQ QUESTIONS 1-9: 0
SUM OF ALL RESPONSES TO PHQ QUESTIONS 1-9: 0
2. FEELING DOWN, DEPRESSED OR HOPELESS: 0

## 2020-10-13 NOTE — PROGRESS NOTES
Subjective:      Patient ID: Portia Boeck is a 61 y.o. female. HPI Here for new patient appt. [facility] lots of pain issues and [facility] last seen Dr Antonio Avery last year July for her chronic pain , as they had   A difference in opinion . [facility] wants to get referral to a new PM doctor.  has been using medical marijuana for a few years- [facility] for her chronic pain issues. Seen Dr Noam Carolina as well as Dr Channing Trevino as well as other PM physicians in the past.  Seeing [facility] for her PTSD as well as depression and anxiety. Currently not seeing   Review of Systems    Objective:   Physical Exam  Constitutional:       General: She is not in acute distress. HENT:      Head: Normocephalic and atraumatic. Right Ear: External ear normal.      Left Ear: External ear normal.   Eyes:      Conjunctiva/sclera: Conjunctivae normal.      Pupils: Pupils are equal, round, and reactive to light. Neck:      Musculoskeletal: Normal range of motion. Thyroid: No thyromegaly. Trachea: No tracheal deviation. Cardiovascular:      Rate and Rhythm: Normal rate and regular rhythm. Heart sounds: No murmur. No friction rub. No gallop. Pulmonary:      Effort: No respiratory distress. Breath sounds: No stridor. No wheezing or rales. Chest:      Chest wall: No tenderness. Abdominal:      General: Bowel sounds are normal. There is no distension. Palpations: Abdomen is soft. Tenderness: There is no abdominal tenderness. There is no rebound. Musculoskeletal:      Comments: ROM back, neck and shoulders Painfully limited. She has slow gait and diff with getting out of chair and starting to walk. Lymphadenopathy:      Cervical: No cervical adenopathy. Skin:     General: Skin is warm. Coloration: Skin is not pale. Findings: No erythema or rash. Neurological:      Mental Status: She is alert and oriented to person, place, and time. Cranial Nerves: No cranial nerve deficit.       Motor: No abnormal muscle tone. Deep Tendon Reflexes: Reflexes normal.   Psychiatric:         Mood and Affect: Mood normal.         Behavior: Behavior normal.         Thought Content: Thought content normal.         Judgment: Judgment normal.         Assessment:       Diagnosis Orders   1. Chronic pain syndrome  TOBY Hernandez MD, Pain Management, [place]   2. Chronic bilateral low back pain with bilateral sciatica     3. Insomnia, unspecified type     4. Mixed hyperlipidemia  Comprehensive Metabolic Panel    Lipid Panel   5. Essential hypertension     6. Chronic neck pain     7. Anxiety     8. Mood disorder ([address]     9. PTSD (post-traumatic stress disorder)     10. Tobacco use disorder, continuous     11. Generalized anxiety disorder     12. Need for influenza vaccination  INFLUENZA, QUADV, 3 YRS AND OLDER, IM PF, PREFILL SYR OR SDV, 0.5ML (AFLURIA QUADV, PF)   13. Need for pneumococcal vaccination  Pneumococcal polysaccharide vaccine 23-valent greater than or equal to 3yo subcutaneous/IM   14. Screening mammogram for high-risk patient  XIMENA DIGITAL SCREEN W OR WO CAD BILATERAL   15. Personal history of tobacco use  [place] VISIT TO DISCUSS LUNG CA SCREEN W LDCT    CT Lung Screen (Annual)   16. Screen for colon cancer  Cologuard (For External Results Only)   17. Marijuana abuse, continuous     18. History of back surgery     19. Class 2 obesity with body mass index (BMI) of 36.0 to 36.9 in adult, unspecified obesity type, unspecified whether serious comorbidity present  Insulin, Free   20. Vitamin D deficiency  Vitamin D 25 Hydroxy   21. Vitamin B deficiency  CBC    Folate    Vitamin B12   22. Screening for thyroid disorder  T3, Free    T4, Free    TSH without Reflex   23. Hyperglycemia  Hemoglobin A1C   24.  Class 2 obesity without serious comorbidity with body mass index (BMI) of 36.0 to 36.9 in adult, unspecified obesity type           Plan:      Orders Placed This Encounter   Procedures    Cologuard (For External Results Only)    XIMENA DIGITAL SCREEN W OR WO CAD BILATERAL    CT Lung Screen (Annual)    INFLUENZA, QUADV, 3 YRS AND OLDER, IM PF, PREFILL SYR OR SDV, 0.5ML (AFLURIA QUADV, PF)    Pneumococcal polysaccharide vaccine 23-valent greater than or equal to 3yo subcutaneous/IM    CBC    Comprehensive Metabolic Panel    Folate    Hemoglobin A1C    T3, Free    T4, Free    TSH without Reflex    Vitamin B12    Vitamin D 25 Hydroxy    Lipid Panel    Insulin, Free    AFL - Linda Jenkins MD, Pain Management, [place]    [place] VISIT TO DISCUSS LUNG CA SCREEN W LDCT       Outpatient Encounter Medications as of 10/13/2020   Medication Sig Dispense Refill    sertraline (ZOLOFT) 100 MG tablet Take 1.5 tablets by mouth daily TAKE 1 TABLET DAILY 60 tablet 0    prazosin (MINIPRESS) 1 MG capsule Take 1 capsule by mouth nightly 30 capsule 5    traZODone (DESYREL) 150 MG tablet TAKE  TABLETS NIGHTLY 90 tablet 3    omeprazole (PRILOSEC) 40 MG delayed release capsule TAKE 1 CAPSULE DAILY 90 capsule 3    albuterol sulfate HFA (VENTOLIN HFA) 108 (90 Base) MCG/ACT inhaler Inhale 2 puffs into the lungs 4 times daily as needed for Wheezing 3 Inhaler 1    magnesium 30 MG tablet Take 200 mg by mouth 2 times daily      buPROPion (WELLBUTRIN XL) 300 MG extended release tablet Take 1 tablet by mouth every morning 90 tablet 3    topiramate (TOPAMAX) 200 MG tablet Take 200 mg by mouth 2 times daily      simvastatin (ZOCOR) 20 MG tablet Take 1 tablet by mouth nightly 90 tablet 3    bisoprolol (ZEBETA) 5 MG tablet Take 0.5 tablets by mouth daily 45 tablet 3    docusate sodium (COLACE) 100 MG capsule Take 1 capsule by mouth 2 times daily as needed for Constipation 60 capsule 2    medical marijuana Take by mouth as needed.       ondansetron (ZOFRAN) 8 MG tablet Take 1 tablet by mouth every 8 hours as needed for Nausea or Vomiting 30 tablet 0    Cholecalciferol (VITAMIN D3) 5000 units TABS Take 1 tablet by mouth daily (with screen-detected lung cancer cases are over diagnosed--that is, the cancer would not have been detected in the patient's lifetime without the screening. Need for follow up screens annually to continue lung cancer screening effectiveness     Risks associated with radiation from annual LDCT- Radiation exposure is about the same as for a mammogram, which is about 1/3 of the annual background radiation exposure from everyday life. Starting screening at age 54 is not likely to increase cancer risk from radiation exposure. Patients with comorbidities resulting in life expectancy of < 10 years, or that would preclude treatment of an abnormality identified on CT, should not be screened due to lack of benefit.     To obtain maximal benefit from this screening, smoking cessation and long-term abstinence from smoking is critical

## 2020-10-13 NOTE — PROGRESS NOTES
Vaccine Information Sheet, \"Influenza - Inactivated\"  given to Lucila Jim, or parent/legal guardian of  Lucila Jim and verbalized understanding. Patient responses:    Have you ever had a reaction to a flu vaccine? No  Do you have any current illness? No  Have you ever had Guillian Amelia Syndrome? No  Do you have a serious allergy to any of the following: Neomycin, Polymyxin, Thimerosal, eggs or egg products? No    Flu vaccine given per order. Please see immunization tab. Risks and benefits explained. Current VIS given.

## 2020-11-09 RX ORDER — DICYCLOMINE HYDROCHLORIDE 10 MG/1
CAPSULE ORAL
Qty: 720 CAPSULE | Refills: 0 | OUTPATIENT
Start: 2020-11-09

## 2020-11-09 NOTE — TELEPHONE ENCOUNTER
Patients  called in asking if she could have a refill of bentyl sent over to their pharmacy. States she is having some abd issues, and has been chugging pepto but it does not work and this medication helped significantly when she was on it.

## 2020-11-10 ENCOUNTER — TELEPHONE (OUTPATIENT)
Dept: FAMILY MEDICINE CLINIC | Age: 60
End: 2020-11-10

## 2020-11-10 NOTE — TELEPHONE ENCOUNTER
Call from pt says her stools are completely black from the bleeding. She does have an appt w/ gastro but not soon. Writer suggested she should go to UC or ER but she says she will not go because there are sick people there.
Viviana Ospina called office telling us to call pt and inform her that there is nothing that could be done and no pill that could be prescribed, and that if her stool is black she could be bleeding internally from her stomach, and that she needs to go be seen at the ER asap.     Informed pt, her response was \"Okay\"
None

## 2020-11-11 ENCOUNTER — HOSPITAL ENCOUNTER (EMERGENCY)
Age: 60
Discharge: HOME | End: 2020-11-11
Attending: EMERGENCY MEDICINE
Payer: COMMERCIAL

## 2020-11-11 ENCOUNTER — APPOINTMENT (OUTPATIENT)
Dept: CT IMAGING | Age: 60
End: 2020-11-11
Payer: COMMERCIAL

## 2020-11-11 VITALS
RESPIRATION RATE: 22 BRPM | SYSTOLIC BLOOD PRESSURE: 180 MMHG | OXYGEN SATURATION: 96 % | TEMPERATURE: 98.2 F | HEIGHT: 67 IN | HEART RATE: 56 BPM | DIASTOLIC BLOOD PRESSURE: 81 MMHG | WEIGHT: 200 LBS | BODY MASS INDEX: 31.39 KG/M2

## 2020-11-11 LAB
ABSOLUTE EOS #: 0.13 K/UL (ref 0–0.44)
ABSOLUTE IMMATURE GRANULOCYTE: 0.05 K/UL (ref 0–0.3)
ABSOLUTE LYMPH #: 2.07 K/UL (ref 1.1–3.7)
ABSOLUTE MONO #: 0.47 K/UL (ref 0.1–1.2)
ALBUMIN SERPL-MCNC: 4.1 G/DL (ref 3.5–5.2)
ALBUMIN/GLOBULIN RATIO: ABNORMAL (ref 1–2.5)
ALP BLD-CCNC: 108 U/L (ref 35–104)
ALT SERPL-CCNC: 10 U/L (ref 5–33)
ANION GAP SERPL CALCULATED.3IONS-SCNC: 11 MMOL/L (ref 9–17)
AST SERPL-CCNC: 20 U/L
BASOPHILS # BLD: 1 % (ref 0–2)
BASOPHILS ABSOLUTE: 0.05 K/UL (ref 0–0.2)
BILIRUB SERPL-MCNC: 0.15 MG/DL (ref 0.3–1.2)
BUN BLDV-MCNC: 12 MG/DL (ref 8–23)
BUN/CREAT BLD: 16 (ref 9–20)
CALCIUM SERPL-MCNC: 9.2 MG/DL (ref 8.6–10.4)
CHLORIDE BLD-SCNC: 104 MMOL/L (ref 98–107)
CO2: 27 MMOL/L (ref 20–31)
CREAT SERPL-MCNC: 0.76 MG/DL (ref 0.5–0.9)
DATE, STOOL #1: NORMAL
DATE, STOOL #2: NORMAL
DATE, STOOL #3: NORMAL
DIFFERENTIAL TYPE: ABNORMAL
EOSINOPHILS RELATIVE PERCENT: 2 % (ref 1–4)
GFR AFRICAN AMERICAN: >60 ML/MIN
GFR NON-AFRICAN AMERICAN: >60 ML/MIN
GFR SERPL CREATININE-BSD FRML MDRD: ABNORMAL ML/MIN/{1.73_M2}
GFR SERPL CREATININE-BSD FRML MDRD: ABNORMAL ML/MIN/{1.73_M2}
GLUCOSE BLD-MCNC: 129 MG/DL (ref 70–99)
HCT VFR BLD CALC: 41.9 % (ref 36.3–47.1)
HEMOCCULT SP1 STL QL: NEGATIVE
HEMOCCULT SP2 STL QL: NORMAL
HEMOCCULT SP3 STL QL: NORMAL
HEMOGLOBIN: 13.6 G/DL (ref 11.9–15.1)
IMMATURE GRANULOCYTES: 1 %
LIPASE: 44 U/L (ref 13–60)
LYMPHOCYTES # BLD: 28 % (ref 24–43)
MCH RBC QN AUTO: 31.1 PG (ref 25.2–33.5)
MCHC RBC AUTO-ENTMCNC: 32.5 G/DL (ref 28.4–34.8)
MCV RBC AUTO: 95.7 FL (ref 82.6–102.9)
MONOCYTES # BLD: 6 % (ref 3–12)
NRBC AUTOMATED: 0 PER 100 WBC
PDW BLD-RTO: 13.3 % (ref 11.8–14.4)
PLATELET # BLD: 163 K/UL (ref 138–453)
PLATELET ESTIMATE: ABNORMAL
PMV BLD AUTO: 10.2 FL (ref 8.1–13.5)
POTASSIUM SERPL-SCNC: 4 MMOL/L (ref 3.7–5.3)
RBC # BLD: 4.38 M/UL (ref 3.95–5.11)
RBC # BLD: ABNORMAL 10*6/UL
SEG NEUTROPHILS: 62 % (ref 36–65)
SEGMENTED NEUTROPHILS ABSOLUTE COUNT: 4.52 K/UL (ref 1.5–8.1)
SODIUM BLD-SCNC: 142 MMOL/L (ref 135–144)
TIME, STOOL #1: 1500
TIME, STOOL #2: NORMAL
TIME, STOOL #3: NORMAL
TOTAL PROTEIN: 6.8 G/DL (ref 6.4–8.3)
WBC # BLD: 7.3 K/UL (ref 3.5–11.3)
WBC # BLD: ABNORMAL 10*3/UL

## 2020-11-11 PROCEDURE — 96361 HYDRATE IV INFUSION ADD-ON: CPT

## 2020-11-11 PROCEDURE — 96374 THER/PROPH/DIAG INJ IV PUSH: CPT

## 2020-11-11 PROCEDURE — 6360000002 HC RX W HCPCS: Performed by: PHYSICIAN ASSISTANT

## 2020-11-11 PROCEDURE — 85025 COMPLETE CBC W/AUTO DIFF WBC: CPT

## 2020-11-11 PROCEDURE — 6360000004 HC RX CONTRAST MEDICATION: Performed by: PHYSICIAN ASSISTANT

## 2020-11-11 PROCEDURE — 2580000003 HC RX 258: Performed by: PHYSICIAN ASSISTANT

## 2020-11-11 PROCEDURE — 80053 COMPREHEN METABOLIC PANEL: CPT

## 2020-11-11 PROCEDURE — 74177 CT ABD & PELVIS W/CONTRAST: CPT

## 2020-11-11 PROCEDURE — 96376 TX/PRO/DX INJ SAME DRUG ADON: CPT

## 2020-11-11 PROCEDURE — 96375 TX/PRO/DX INJ NEW DRUG ADDON: CPT

## 2020-11-11 PROCEDURE — 83690 ASSAY OF LIPASE: CPT

## 2020-11-11 PROCEDURE — 82272 OCCULT BLD FECES 1-3 TESTS: CPT

## 2020-11-11 PROCEDURE — 99283 EMERGENCY DEPT VISIT LOW MDM: CPT

## 2020-11-11 RX ORDER — 0.9 % SODIUM CHLORIDE 0.9 %
80 INTRAVENOUS SOLUTION INTRAVENOUS ONCE
Status: COMPLETED | OUTPATIENT
Start: 2020-11-11 | End: 2020-11-11

## 2020-11-11 RX ORDER — SODIUM CHLORIDE 0.9 % (FLUSH) 0.9 %
10 SYRINGE (ML) INJECTION PRN
Status: DISCONTINUED | OUTPATIENT
Start: 2020-11-11 | End: 2020-11-11 | Stop reason: HOSPADM

## 2020-11-11 RX ORDER — ONDANSETRON 4 MG/1
4 TABLET, ORALLY DISINTEGRATING ORAL EVERY 8 HOURS PRN
Qty: 20 TABLET | Refills: 0 | Status: SHIPPED | OUTPATIENT
Start: 2020-11-11 | End: 2021-07-08 | Stop reason: ALTCHOICE

## 2020-11-11 RX ORDER — DICYCLOMINE HCL 20 MG
20 TABLET ORAL EVERY 6 HOURS
Qty: 40 TABLET | Refills: 0 | Status: SHIPPED | OUTPATIENT
Start: 2020-11-11 | End: 2021-07-08 | Stop reason: ALTCHOICE

## 2020-11-11 RX ORDER — MORPHINE SULFATE 4 MG/ML
4 INJECTION, SOLUTION INTRAMUSCULAR; INTRAVENOUS ONCE
Status: COMPLETED | OUTPATIENT
Start: 2020-11-11 | End: 2020-11-11

## 2020-11-11 RX ORDER — 0.9 % SODIUM CHLORIDE 0.9 %
1000 INTRAVENOUS SOLUTION INTRAVENOUS ONCE
Status: COMPLETED | OUTPATIENT
Start: 2020-11-11 | End: 2020-11-11

## 2020-11-11 RX ORDER — HYDROCODONE BITARTRATE AND ACETAMINOPHEN 5; 325 MG/1; MG/1
1-2 TABLET ORAL EVERY 8 HOURS PRN
Qty: 12 TABLET | Refills: 0 | Status: SHIPPED | OUTPATIENT
Start: 2020-11-11 | End: 2020-11-14

## 2020-11-11 RX ORDER — ONDANSETRON 2 MG/ML
4 INJECTION INTRAMUSCULAR; INTRAVENOUS ONCE
Status: COMPLETED | OUTPATIENT
Start: 2020-11-11 | End: 2020-11-11

## 2020-11-11 RX ADMIN — SODIUM CHLORIDE 1000 ML: 9 INJECTION, SOLUTION INTRAVENOUS at 14:31

## 2020-11-11 RX ADMIN — MORPHINE SULFATE 4 MG: 4 INJECTION, SOLUTION INTRAMUSCULAR; INTRAVENOUS at 14:31

## 2020-11-11 RX ADMIN — Medication 10 ML: at 15:26

## 2020-11-11 RX ADMIN — IOPAMIDOL 75 ML: 755 INJECTION, SOLUTION INTRAVENOUS at 15:26

## 2020-11-11 RX ADMIN — ONDANSETRON 4 MG: 2 INJECTION INTRAMUSCULAR; INTRAVENOUS at 14:31

## 2020-11-11 RX ADMIN — SODIUM CHLORIDE 80 ML: 9 INJECTION, SOLUTION INTRAVENOUS at 15:26

## 2020-11-11 RX ADMIN — MORPHINE SULFATE 4 MG: 4 INJECTION, SOLUTION INTRAMUSCULAR; INTRAVENOUS at 16:24

## 2020-11-11 ASSESSMENT — PAIN SCALES - GENERAL
PAINLEVEL_OUTOF10: 10
PAINLEVEL_OUTOF10: 10
PAINLEVEL_OUTOF10: 9

## 2020-11-11 NOTE — ED PROVIDER NOTES
[address] [place] [address] ED  [facility]      Pt Name: Nori Velasco  MRN: [id number]  [place] 1960  Date ofevaluation: 11/11/2020  Provider: Allyson Brandon PA-C    CHIEF COMPLAINT       Chief Complaint   Patient presents with    Abdominal Pain     started several months ago    Rectal Bleeding         HISTORY OF PRESENT ILLNESS  (Location/Symptom, Timing/Onset, Context/Setting, Quality, Duration, Modifying Factors, Severity.)   Nori Velasco is a 61 y.o. female who presents to the emergency department with epigastric, left-sided, left lower quadrant sanjana pain over the last few days. No fevers or chills. For some nausea no vomiting. No definite alleviating or aggravating factors. No other complaints. Described as mild to moderate, sharp and constant. Nursing Notes were reviewed.     ALLERGIES     Penicillins    CURRENT MEDICATIONS       Discharge Medication List as of 11/11/2020  3:57 PM      CONTINUE these medications which have NOT CHANGED    Details   sertraline (ZOLOFT) 100 MG tablet Take 1.5 tablets by mouth daily TAKE 1 TABLET DAILY, Disp-60 tablet,R-0Normal      gabapentin (NEURONTIN) 600 MG tablet TAKE 1 TABLET TWICE A DAY, Disp-90 tablet,R-8Normal      prazosin (MINIPRESS) 1 MG capsule Take 1 capsule by mouth nightly, Disp-30 capsule,R-5Normal      brexpiprazole (REXULTI) 1 MG TABS tablet Take 1 tablet by mouth daily, Disp-30 tablet,R-5Normal      traZODone (DESYREL) 150 MG tablet TAKE  TABLETS NIGHTLY, Disp-90 tablet, R-3Normal      omeprazole (PRILOSEC) 40 MG delayed release capsule TAKE 1 CAPSULE DAILY, Disp-90 capsule, R-3Normal      albuterol sulfate HFA (VENTOLIN HFA) 108 (90 Base) MCG/ACT inhaler Inhale 2 puffs into the lungs 4 times daily as needed for Wheezing, Disp-3 Inhaler, R-1Normal      magnesium 30 MG tablet Take 200 mg by mouth 2 times dailyHistorical Med      buPROPion (WELLBUTRIN XL) 300 MG extended release tablet Take 1 tablet by mouth every morning, Disp-90 tablet, R-3Normal      topiramate (TOPAMAX) 200 MG tablet Take 200 mg by mouth 2 times dailyHistorical Med      simvastatin (ZOCOR) 20 MG tablet Take 1 tablet by mouth nightly, Disp-90 tablet, R-3Normal      bisoprolol (ZEBETA) 5 MG tablet Take 0.5 tablets by mouth daily, Disp-45 tablet, R-3Normal      Cholecalciferol (VITAMIN D3) 5000 units TABS Take 1 tablet by mouth daily (with breakfast), Disp-90 tablet, R-1Normal             PAST MEDICAL HISTORY         Diagnosis Date    Adenomatous polyp 5/1/2017    Arthritis     DDD    Chronic prescription opiate use 10/11/2018    Depression     Generalized anxiety disorder 1/22/2016    Hyperlipidemia     Methadone dependence ([facility] 1/22/2016    Severe episode of recurrent major depressive disorder, without psychotic features ([facility]     Subarachnoid hemorrhage ([facility] 7/28/2019    Tobacco use disorder, continuous 1/22/2016       SURGICAL HISTORY           Procedure Laterality Date    ANKLE FRACTURE SURGERY Left     APPENDECTOMY      BACK SURGERY      fusion, hardware. Dr. Cherelle Hinton    COLONOSCOPY  05/04/2017    HYPERPLASTIC POLYP       COLONOSCOPY  04/24/2017    ADENOMATOUS POLYP.      DILATION AND CURETTAGE OF UTERUS      FOOT SURGERY      KNEE SURGERY Bilateral     NE COLONOSCOPY W/BIOPSY SINGLE/MULTIPLE N/A 4/24/2017    [facility] W/[facility]SY SINGLE/MULTIPLE N/A 5/4/2017    COLONOSCOPY WITH BIOPSY performed by Speedy Suarez MD at [address] [place] [address] EGD TRANSORAL BIOPSY SINGLE/MULTIPLE N/A 5/2/2017    EGD BIOPSY performed by Speedy Suarez MD at [facility] ENDOSCOPY  05/02/2017         FAMILY HISTORY           Problem Relation Age of Onset    Diabetes Father     Substance Abuse Daughter     Substance Abuse Daughter     Mental Illness Daughter      Family Status   Relation Name Status    Mother  at age 58        unknown   [facility] Father   at age 72        alcoholism, DM, and believe CVA    Sister  Alive    MGM      MGF      PGM      PGF      Sister  Alive    Sister   at age 48        MS complications, in Nursing home.  Jessika  Alive    Jessika  Alive    Jessika  Alive        SOCIAL HISTORY      reports that she has been smoking cigarettes. She has a 40.00 pack-year smoking history. She has never used smokeless tobacco. She reports current drug use. Drug: Marijuana. She reports that she does not drink alcohol. REVIEW OFSYSTEMS    (2-9 systems for level 4, 10 or more for level 5)   Review of Systems    Except as noted above the remainder of the review of systems was reviewed and negative. PHYSICAL EXAM    (up to 7 for level 4, 8 or more for level 5)     ED Triage Vitals   BP Temp Temp src Pulse Resp SpO2 Height Weight   20 1241 20 1241 -- 20 1241 20 1241 20 1241 20 1240 20 1240   (!) 180/81 98.2 °F (36.8 °C)  56 22 96 % 5' 7\" (1.702 m) 200 lb (90.7 kg)      Physical Exam  Constitutional:       Appearance: She is well-developed. HENT:      Head: Normocephalic and atraumatic. Neck:      Musculoskeletal: Normal range of motion and neck supple. Cardiovascular:      Rate and Rhythm: Normal rate and regular rhythm. Pulmonary:      Effort: Pulmonary effort is normal.      Breath sounds: Normal breath sounds. Abdominal:      Palpations: Abdomen is soft. Tenderness: There is abdominal tenderness in the epigastric area, left upper quadrant and left lower quadrant. Musculoskeletal: Normal range of motion. Skin:     General: Skin is warm. Findings: No rash. Neurological:      Mental Status: She is alert and oriented to person, place, and time.    Psychiatric:         Behavior: Behavior normal.                 DIAGNOSTIC RESULTS     EKG: All EKG's are interpreted by the Emergency Department Physician who either signs or Co-signs this chart in the absence of a cardiologist.        RADIOLOGY:   Non-plain film images such as CT, Ultrasound and MRI are read by the radiologist. Plain radiographic images arevisualized and preliminarily interpreted by the emergency physician with the below findings:        Interpretation per the Radiologist below, if available at thetime of this note:          ED BEDSIDE ULTRASOUND:   Performed by ED Physician - none    LABS:  Labs Reviewed   CBC WITH AUTO DIFFERENTIAL - Abnormal; Notable for the following components:       Result Value    Immature Granulocytes 1 (*)     All other components within normal limits   COMPREHENSIVE METABOLIC PANEL - Abnormal; Notable for the following components:    Glucose 129 (*)     Alkaline Phosphatase 108 (*)     Total Bilirubin 0.15 (*)     All other components within normal limits   LIPASE   BLOOD OCCULT STOOL DIAGNOSTIC       All other labs were within normal range or not returned as of this dictation. EMERGENCY DEPARTMENT COURSE and DIFFERENTIAL DIAGNOSIS/MDM:   Vitals:    Vitals:    11/11/20 1240 11/11/20 1241   BP:  (!) 180/81   Pulse:  56   Resp:  22   Temp:  98.2 °F (36.8 °C)   SpO2:  96%   Weight: 200 lb (90.7 kg)    Height: 5' 7\" (1.702 m)      Patient abdominal CT scan was negative. Patient will be treated symptomatically and discharged home. Pre-hypertension/Hypertension: The patient has been informed that they may have pre-hypertension or Hypertension based on a blood pressure reading in the emergency department. I recommend that the patient call the primary care provider listed on their discharge instructions or a physician of their choice this week to arrange follow up for further evaluation of possible pre-hypertension or Hypertension. CONSULTS:  None    PROCEDURES:  Procedures        FINAL IMPRESSION      1. Generalized abdominal pain    2.  Elevated blood pressure reading          DISPOSITION/PLAN   DISPOSITION

## 2020-11-12 NOTE — ED PROVIDER NOTES
eMERGENCY dEPARTMENT eNCOUnter   Independent Attestation     Pt Name: Ruth Hampton  MRN: [id number]  [place] 1960  Date of evaluation: 11/12/20     Ruth Hampton is a 61 y.o. female with CC: Abdominal Pain (started several months ago) and Rectal Bleeding      Based on the medical record the care appears appropriate. I was personally available for consultation in the Emergency Department.     Joycelyn Oropeza MD  Attending Emergency Physician                  Madison Lane MD  11/12/20 2610

## 2020-12-01 RX ORDER — DICYCLOMINE HCL 20 MG
20 TABLET ORAL EVERY 6 HOURS
Qty: 40 TABLET | Refills: 0 | OUTPATIENT
Start: 2020-12-01

## 2020-12-01 RX ORDER — SERTRALINE HYDROCHLORIDE 100 MG/1
150 TABLET, FILM COATED ORAL DAILY
Qty: 45 TABLET | Refills: 0 | Status: SHIPPED
Start: 2020-12-01 | End: 2021-03-31 | Stop reason: SDUPTHER

## 2020-12-01 NOTE — TELEPHONE ENCOUNTER
Pt says you dc her dicylomine 20 mg but she was receintly went to [facility], [facility] ER who put her back on the med.  She is asking for a refill

## 2020-12-01 NOTE — TELEPHONE ENCOUNTER
Health Maintenance   Topic Date Due    Breast cancer screen  10/06/2010    Low dose CT lung screening  10/06/2015    Colon cancer screen colonoscopy  05/04/2019    Lipid screen  03/19/2020    A1C test (Diabetic or Prediabetic)  07/29/2020    DTaP/Tdap/Td vaccine (1 - Tdap) 10/13/2021 (Originally 10/6/1979)    Cervical cancer screen  10/13/2021 (Originally 1/22/2019)    Shingles Vaccine (1 of 2) 10/13/2021 (Originally 10/6/2010)    Potassium monitoring  11/11/2021    Creatinine monitoring  11/11/2021    Flu vaccine  Completed    Pneumococcal 0-64 years Vaccine  Completed    Hepatitis C screen  Completed    HIV screen  Completed    Hepatitis A vaccine  Aged Out    Hepatitis B vaccine  Aged Out    Hib vaccine  Aged Out    Meningococcal (ACWY) vaccine  Aged Out             (applicable per patient's age: Cancer Screenings, Depression Screening, Fall Risk Screening, Immunizations)    Hemoglobin A1C (%)   Date Value   07/29/2019 6.4 (H)   03/19/2019 6.2 (H)     LDL Cholesterol (mg/dL)   Date Value   03/19/2019 151 (H)     AST (U/L)   Date Value   11/11/2020 20     ALT (U/L)   Date Value   11/11/2020 10     BUN (mg/dL)   Date Value   11/11/2020 12      (goal A1C is < 7)   (goal LDL is <100) need 30-50% reduction from baseline     BP Readings from Last 3 Encounters:   11/11/20 (!) 180/81   10/13/20 120/71   09/19/20 (!) 157/77    (goal /80)      All Future Testing planned in [facility]:  Lab Frequency Next Occurrence   PT aquatic therapy Once 12/11/2019   XR Lumbar Spine Ap Lateral Flexion and Extension and Oblique Once 12/10/2019   XR CERVICAL SPINE FLEXION AND EXTENSION Once 12/10/2019   XIMENA DIGITAL SCREEN W OR WO CAD BILATERAL Once 02/04/2021   CT Lung Screen (Annual) Once 01/28/2021   Cologuard (For External Results Only) Once 01/21/2021   CBC Once 01/21/2021   Comprehensive Metabolic Panel Once 56/94/7407   Folate Once 01/21/2021   Hemoglobin A1C Once 01/21/2021   T3, Free Once 01/21/2021   T4,

## 2020-12-02 ENCOUNTER — TELEPHONE (OUTPATIENT)
Dept: ONCOLOGY | Age: 60
End: 2020-12-02

## 2020-12-02 NOTE — LETTER
12/2/2020        Lex Whitaker  [address] [place] [id number]    Dear Lex Whitaker: Your healthcare provider has ordered a low dose CT scan of the chest for lung cancer screening. You will find enclosed, information about CT lung screening. Please review the statement of understanding, you will be asked to sign a copy of this at the time of your CT scan    If you have not already been contacted to make the appointment for your scan, please call our scheduling department at [phone]    Keep in mind that CT lung screening does not take the place of smoking cessation. If you are a current smoker, you will find enclosed smoking cessation resources. Please do not hesitate to contact me if you have any questions or concerns.     [address] [place] [address] Lung Screening Program  [phone]

## 2020-12-16 ENCOUNTER — TELEPHONE (OUTPATIENT)
Dept: FAMILY MEDICINE CLINIC | Age: 60
End: 2020-12-16

## 2020-12-17 ENCOUNTER — TELEPHONE (OUTPATIENT)
Dept: FAMILY MEDICINE CLINIC | Age: 60
End: 2020-12-17

## 2020-12-17 NOTE — TELEPHONE ENCOUNTER
Pt  called said their daughter has passed away and Gracia needed something called in for her migraine. A same day appt was made but he called back and cancelled the appt. Writer asked if she was able to have a virtual visit but he said he did not have his e-mail set up and that he would work on it after work and schedule a virtual visit. Pt daughter called and said they had lost her sister her mom was crying with a migraine and she did not understand why we could not just call something in for her.  writer explained that she was not on her moms communication form and we could not discuss anything concerning her moms care.

## 2020-12-18 NOTE — TELEPHONE ENCOUNTER
Patient advised, she was also offered a follow up appointment but refused. Patient states she will call when she is ready to follow up> not sure if she will be seen at ER because she says it costs her too much money.  SHENA

## 2021-01-14 ENCOUNTER — OFFICE VISIT (OUTPATIENT)
Dept: NEUROLOGY | Age: 61
End: 2021-01-14
Payer: COMMERCIAL

## 2021-01-14 VITALS
OXYGEN SATURATION: 92 % | WEIGHT: 200 LBS | DIASTOLIC BLOOD PRESSURE: 79 MMHG | HEART RATE: 64 BPM | SYSTOLIC BLOOD PRESSURE: 142 MMHG | BODY MASS INDEX: 31.39 KG/M2 | HEIGHT: 67 IN

## 2021-01-14 DIAGNOSIS — I10 ESSENTIAL HYPERTENSION: ICD-10-CM

## 2021-01-14 DIAGNOSIS — G43.709 CHRONIC MIGRAINE WITHOUT AURA WITHOUT STATUS MIGRAINOSUS, NOT INTRACTABLE: ICD-10-CM

## 2021-01-14 DIAGNOSIS — M54.2 CHRONIC NECK PAIN: ICD-10-CM

## 2021-01-14 DIAGNOSIS — Z86.79 HISTORY OF SUBARACHNOID HEMORRHAGE: ICD-10-CM

## 2021-01-14 DIAGNOSIS — G89.29 CHRONIC NECK PAIN: ICD-10-CM

## 2021-01-14 DIAGNOSIS — F12.10 MARIJUANA ABUSE, CONTINUOUS: ICD-10-CM

## 2021-01-14 DIAGNOSIS — E55.9 VITAMIN D DEFICIENCY: ICD-10-CM

## 2021-01-14 DIAGNOSIS — G89.4 CHRONIC PAIN SYNDROME: ICD-10-CM

## 2021-01-14 DIAGNOSIS — R51.9 WORSENING HEADACHES: Primary | ICD-10-CM

## 2021-01-14 DIAGNOSIS — R51.9 CHRONIC DAILY HEADACHE: ICD-10-CM

## 2021-01-14 DIAGNOSIS — I67.1 BRAIN ANEURYSM: ICD-10-CM

## 2021-01-14 DIAGNOSIS — F41.9 ANXIETY: ICD-10-CM

## 2021-01-14 DIAGNOSIS — Z98.890 HISTORY OF BACK SURGERY: ICD-10-CM

## 2021-01-14 PROCEDURE — 99205 OFFICE O/P NEW HI 60 MIN: CPT | Performed by: STUDENT IN AN ORGANIZED HEALTH CARE EDUCATION/TRAINING PROGRAM

## 2021-01-14 PROCEDURE — 64405 NJX AA&/STRD GR OCPL NRV: CPT | Performed by: STUDENT IN AN ORGANIZED HEALTH CARE EDUCATION/TRAINING PROGRAM

## 2021-01-14 RX ORDER — METHYLPREDNISOLONE ACETATE 40 MG/ML
40 INJECTION, SUSPENSION INTRA-ARTICULAR; INTRALESIONAL; INTRAMUSCULAR; SOFT TISSUE ONCE
Status: COMPLETED | OUTPATIENT
Start: 2021-01-14 | End: 2021-01-14

## 2021-01-14 RX ORDER — METHYLPREDNISOLONE ACETATE 40 MG/ML
80 INJECTION, SUSPENSION INTRA-ARTICULAR; INTRALESIONAL; INTRAMUSCULAR; SOFT TISSUE ONCE
Status: COMPLETED | OUTPATIENT
Start: 2021-01-14 | End: 2021-01-14

## 2021-01-14 RX ORDER — TIZANIDINE 2 MG/1
TABLET ORAL
COMMUNITY
Start: 2021-01-04 | End: 2021-01-18 | Stop reason: SINTOL

## 2021-01-14 RX ORDER — LIDOCAINE HYDROCHLORIDE 10 MG/ML
3 INJECTION, SOLUTION EPIDURAL; INFILTRATION; INTRACAUDAL; PERINEURAL ONCE
Status: COMPLETED | OUTPATIENT
Start: 2021-01-14 | End: 2021-01-14

## 2021-01-14 RX ORDER — NAPROXEN SODIUM 550 MG/1
550 TABLET ORAL 2 TIMES DAILY PRN
Qty: 28 TABLET | Refills: 3 | Status: SHIPPED | OUTPATIENT
Start: 2021-01-14 | End: 2021-03-08

## 2021-01-14 RX ORDER — BUTALBITAL, ACETAMINOPHEN AND CAFFEINE 50; 325; 40 MG/1; MG/1; MG/1
TABLET ORAL
Qty: 14 TABLET | Refills: 1 | Status: SHIPPED | OUTPATIENT
Start: 2021-01-14 | End: 2021-03-23 | Stop reason: SDUPTHER

## 2021-01-14 RX ORDER — MAGNESIUM OXIDE 400 MG/1
400 TABLET ORAL DAILY
Qty: 30 TABLET | Refills: 3 | Status: SHIPPED | OUTPATIENT
Start: 2021-01-14 | End: 2021-02-13

## 2021-01-14 RX ADMIN — LIDOCAINE HYDROCHLORIDE 3 ML: 10 INJECTION, SOLUTION EPIDURAL; INFILTRATION; INTRACAUDAL; PERINEURAL at 18:37

## 2021-01-14 RX ADMIN — METHYLPREDNISOLONE ACETATE 80 MG: 40 INJECTION, SUSPENSION INTRA-ARTICULAR; INTRALESIONAL; INTRAMUSCULAR; SOFT TISSUE at 18:39

## 2021-01-14 RX ADMIN — METHYLPREDNISOLONE ACETATE 40 MG: 40 INJECTION, SUSPENSION INTRA-ARTICULAR; INTRALESIONAL; INTRAMUSCULAR; SOFT TISSUE at 18:38

## 2021-01-14 ASSESSMENT — ENCOUNTER SYMPTOMS
EYE DISCHARGE: 0
BACK PAIN: 1
SINUS PAIN: 0
SORE THROAT: 0
SHORTNESS OF BREATH: 0
DIARRHEA: 0
NAUSEA: 1
PHOTOPHOBIA: 1
EYE REDNESS: 0
EYE PAIN: 0
CONSTIPATION: 0
COUGH: 0
VOMITING: 0
ABDOMINAL PAIN: 0

## 2021-01-14 NOTE — PROGRESS NOTES
OCCIPITAL NERVE BLOCK PROCEDURE NOTE:            PATIENT: Gracia Guerin    ALLERGIES:   Allergies   Allergen Reactions    Penicillins Rash       DESCRIPTION OF PROCEDURE: The risks and benefits of the procedure were discussed with the patient. All of the patient's questions were answered. An appropriate timeout was performed prior to the procedure. The patient was placed in the seated position. The right and left occipital grove was identified. The area was cleansed with an alcohol swab. 1.5mL of (Dose) Depo medrol and 1.5mL of (dose) 1% Lidocaine were injected at each site. The patient tolerated the procedure well. COMPLICATIONS: None.      Electronically signed by Sukhi Rapp MD on 1/14/2021 at 6:36 PM

## 2021-01-14 NOTE — PROGRESS NOTES
[address] [place] [address], [facility], [facility], [place] [address], [address] , [address] [place] [address]  P: [phone]  F: [phone]    NEUROLOGY CLINIC NOTE     PATIENT NAME: Ellyn Wood  PATIENT MRN: [id number]  PRIMARY CARE PHYSICIAN: Alek Madsen MD    HPI:      Ellyn Wood is a 61 y.o. right handed  female was seen in the clinic for headaches    History obtained from Patient     Headaches  Headaches started having headaches when she was 48years old, gradually getting worse for last 1 year. She endorses headache throbbing/pulsating in nature, occurring at the top of the head and bifrontotemporal.  Almost occurring daily 6-10/10 intensity. Most of the time sudden onset, can last for 8 to 10 hours, sometimes with the whole day. Endorses nausea, photophobia, phonophobia, blurry vision, spots in front of the eyes, dizziness and lightheadedness, mood changes, neck pain or neck stiffness associated with the headache. Denies any vomitings. Focal tingling numbness or muscle weakness or personality changes or changes in the speech. Denies any redness in the eyes or watering from the nose or watering from the eyes or facial sweating.     Headaches are typically triggered by stress, skipping meals, hunger , fasting, strong smells, bright lights, sunlight, noises and seasonal changes    Headaches are relieved by lying in a darkened room    Headache free days per month: 2  Cutaneous allodynia: yes -sometimes    History of:  Family history of headaches or migraines: no  Renal stones: no   Motion sickness:  no  Head/Neck Trauma: no  Stressors: yes -life related  Sleep: 5  Hours,            Difficulty in initiating or maintaining sleep: yes -both,              Snoring: no  Psych/Mood: Appropriate  Caffeine: 3 cup coffee per day    Headache Medications  Current abortive meds: OTC pain medication  Current prophylactic meds: Takes gabapentin 600 mg 3 times a day for back pain and paresthesias Previous abortive medications tried: OTC pain medications  Previous prophylactic medications tried: Topamax 200 mg twice a day    Patient has a history of subarachnoid hemorrhage in July 2019. Follows up with endovascular, was found to have brain aneurysms. PATIENT HISTORY:     Past Medical History:   Diagnosis Date    Adenomatous polyp 5/1/2017    Arthritis     DDD    Chronic prescription opiate use 10/11/2018    Depression     Generalized anxiety disorder 1/22/2016    Hyperlipidemia     Methadone dependence ([facility] 1/22/2016    Severe episode of recurrent major depressive disorder, without psychotic features ([facility]     Subarachnoid hemorrhage ([facility] 7/28/2019    Tobacco use disorder, continuous 1/22/2016        Past Surgical History:   Procedure Laterality Date    ANKLE FRACTURE SURGERY Left     APPENDECTOMY      BACK SURGERY      fusion, hardware. Dr. Marjorie Renee    COLONOSCOPY  05/04/2017    HYPERPLASTIC POLYP       COLONOSCOPY  04/24/2017    ADENOMATOUS POLYP.      DILATION AND CURETTAGE OF UTERUS      FOOT SURGERY      KNEE SURGERY Bilateral     IN COLONOSCOPY W/BIOPSY SINGLE/MULTIPLE N/A 4/24/2017    COLONOSCOPY WITH BIOPSY performed by Elvira Ac MD at [address] W/BIOPSY SINGLE/MULTIPLE N/A 5/4/2017    COLONOSCOPY WITH BIOPSY performed by Elvira Ac MD at [address] [place] [address] EGD TRANSORAL BIOPSY SINGLE/MULTIPLE N/A 5/2/2017    EGD BIOPSY performed by Elvira Ac MD at [facility] ENDOSCOPY  05/02/2017        Social History     Socioeconomic History    Marital status:      Spouse name: Not on file    Number of children: Not on file    Years of education: Not on file    Highest education level: Not on file   Occupational History    Not on file   Social Needs    Financial resource strain: Not on file  Food insecurity     Worry: Not on file     Inability: Not on file    Transportation needs     Medical: Not on file     Non-medical: Not on file   Tobacco Use    Smoking status: Current Every Day Smoker     Packs/day: 1.00     Years: 40.00     Pack years: 40.00     Types: Cigarettes    Smokeless tobacco: Never Used    Tobacco comment: almost off cigarettes, but now vaping. Substance and Sexual Activity    Alcohol use: No     Alcohol/week: 0.0 standard drinks     Comment: denies any heavy use.  Drug use: Yes     Types: Marijuana     Comment: Medical marijuana card verified.  once a day    Sexual activity: Not Currently     Partners: Male     Comment: spouse    Lifestyle    Physical activity     Days per week: Not on file     Minutes per session: Not on file    Stress: Not on file   Relationships    Social connections     Talks on phone: Not on file     Gets together: Not on file     Attends Jainism service: Not on file     Active member of club or organization: Not on file     Attends meetings of clubs or organizations: Not on file     Relationship status: Not on file    Intimate partner violence     Fear of current or ex partner: Not on file     Emotionally abused: Not on file     Physically abused: Not on file     Forced sexual activity: Not on file   Other Topics Concern    Not on file   Social History Narrative    Not on file        Current Outpatient Medications   Medication Sig Dispense Refill    sertraline (ZOLOFT) 100 MG tablet Take 1.5 tablets by mouth daily TAKE 1 TABLET DAILY 45 tablet 0    dicyclomine (BENTYL) 20 MG tablet Take 1 tablet by mouth every 6 hours 40 tablet 0    ondansetron (ZOFRAN ODT) 4 MG disintegrating tablet Take 1 tablet by mouth every 8 hours as needed for Nausea 20 tablet 0    prazosin (MINIPRESS) 1 MG capsule Take 1 capsule by mouth nightly 30 capsule 5    traZODone (DESYREL) 150 MG tablet TAKE  TABLETS NIGHTLY 90 tablet 3  omeprazole (PRILOSEC) 40 MG delayed release capsule TAKE 1 CAPSULE DAILY 90 capsule 3    albuterol sulfate HFA (VENTOLIN HFA) 108 (90 Base) MCG/ACT inhaler Inhale 2 puffs into the lungs 4 times daily as needed for Wheezing 3 Inhaler 1    Cholecalciferol (VITAMIN D3) 5000 units TABS Take 1 tablet by mouth daily (with breakfast) 90 tablet 1    tiZANidine (ZANAFLEX) 2 MG tablet       gabapentin (NEURONTIN) 600 MG tablet TAKE 1 TABLET TWICE A DAY 90 tablet 8    brexpiprazole (REXULTI) 1 MG TABS tablet Take 1 tablet by mouth daily 30 tablet 5    magnesium 30 MG tablet Take 200 mg by mouth 2 times daily      buPROPion (WELLBUTRIN XL) 300 MG extended release tablet Take 1 tablet by mouth every morning (Patient not taking: Reported on 1/14/2021) 90 tablet 3    topiramate (TOPAMAX) 200 MG tablet Take 200 mg by mouth 2 times daily      simvastatin (ZOCOR) 20 MG tablet Take 1 tablet by mouth nightly (Patient not taking: Reported on 1/14/2021) 90 tablet 3    bisoprolol (ZEBETA) 5 MG tablet Take 0.5 tablets by mouth daily (Patient not taking: Reported on 1/14/2021) 45 tablet 3     No current facility-administered medications for this visit. ALLERGIES  Allergies   Allergen Reactions    Penicillins Rash        REVIEW OF SYSTEMS:     Review of Systems   Constitutional: Positive for fatigue. Negative for chills, diaphoresis, fever and unexpected weight change. HENT: Negative for congestion, ear discharge, ear pain, hearing loss, sinus pain and sore throat. Eyes: Positive for photophobia and visual disturbance. Negative for pain, discharge and redness. Respiratory: Negative for cough and shortness of breath. Cardiovascular: Negative for chest pain, palpitations and leg swelling. Gastrointestinal: Positive for nausea. Negative for abdominal pain, constipation, diarrhea and vomiting. Endocrine: Negative for polydipsia and polyuria. Genitourinary: Negative for difficulty urinating and hematuria. Musculoskeletal: Positive for arthralgias, back pain, gait problem, neck pain and neck stiffness. Skin: Negative for pallor and rash. Neurological: Positive for dizziness, light-headedness and headaches. Negative for tremors, seizures, syncope, facial asymmetry, speech difficulty, weakness and numbness. Hematological: Does not bruise/bleed easily. Psychiatric/Behavioral: Positive for agitation and confusion. Negative for behavioral problems and hallucinations. VITALS  BP (!) 142/79 (Site: Right Upper Arm, Position: Sitting, Cuff Size: Medium Adult)   Pulse 64   Ht 5' 7\" (1.702 m)   Wt 200 lb (90.7 kg)   SpO2 92%   BMI 31.32 kg/m²      PHYSICAL EXAMINATION:     Constitutional: Well developed, not in distress  Head:  normocephalic, atraumatic. Neck: supple, no carotid bruits,  Respiratory: Clear to auscultation bilaterally with no use of accessory muscles during respiration. Cardiovascular: normal rate, regular rhythm  Abdomen: Soft, nontender, nondistended  Extremities:  peripheral pulses palpable, no pedal edema  Psych: normal affect      NEUROLOGICAL EXAMINATION:     Mental status   Alert and oriented; speech intact.   Memory intact     Cranial nerves   II - visual fields intact to confrontation                                                III, IV, VI  extra-ocular muscles full: no pupillary defect; no BOLA, no nystagmus, no ptosis   V - normal facial sensation                                                               VII - normal facial symmetry                                                             VIII -hard of hearing                                                                      IX, X - symmetrical palate                                                                  XI - symmetrical shoulder shrug                                                       XII - midline tongue without atrophy or fasciculation     Motor function  Normal muscle bulk and tone Muscle strength: normal power 5/5     Sensory function Intact to light touch in bilateral upper and lower extremities. Cerebellar No involuntary movements or tremors     Reflex function Intact 2+ DTR . Negative Babinski     Gait                   slow cautious gait           PRIOR TESTS AND IMAGING: Following images and Labs were reviewed by the examiner       MRI Brain: 7/28/2019   MRA head without contrast 7/28/2019      FINDINGS:   INTRACRANIAL STRUCTURES/VENTRICLES: There is no acute infarct. No mass effect   or midline shift. No evidence of an acute intracranial hemorrhage.  The   ventricles and sulci are normal in size and configuration.  The   sellar/suprasellar regions appear unremarkable.  The normal signal voids   within the major intracranial vessels appear maintained.       ORBITS: The visualized portion of the orbits demonstrate no acute abnormality.       SINUSES: The visualized paranasal sinuses and mastoid air cells are well   aerated.       BONES/SOFT TISSUES: The bone marrow signal intensity appears normal. The soft   tissues demonstrate no acute abnormality. Impression   Unremarkable MRA of the head given motion artifact.  No intracranial large   artery focal high-grade stenosis, occlusion or aneurysm.       Concurrent MRI of the brain demonstrates trace acute subarachnoid hemorrhage   in the high right parietal region near the vertex.           Cerebral angiogram 7/29/2019  Impression:    1. There is a left-sided anterior genu cavernous ICA aneurysm noted measuring 2.06 x 1.49 x 1.4 mm. Also there is a left-sided posterior genu cavernous ICA aneurysm measuring 2.8 x 2.8 x 2.2 mm.   2. 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    3. There is no clear evidence of vasospasm or RCVS.         Vit D :  TSH :     ASSESSMENT / PLAN:       Yamile Kaba is a 61 y.o. right handed  female was seen in the clinic for headaches    Worsening headaches ? ensure 3 to 4 meals at regular times a day  ? look for and avoid food related triggers (chocolate janice, etc.)  ? maintain good sleep hygiene, regular 8 to 10 hours of sleep  ? avoid staying up late, late night partying. History of brain aneurysm and subarachnoid hemorrhage. Recommend following up with neuro endovascular for further management of the brain aneurysms. - Follow up in the clinic in 6 weeks. - Instructed patient to call the clinic if symptoms worsen or develop any new symptoms. I have spent 62 minutes  with the patient more than 50% of this time was spent reviewing medical records, discussing imaging findings, counseling regarding medications side effects and compliance, healthy habits and coordinating care.       Electronically signed by Alexander Amador MD on 1/14/2021 at 12:16 PM

## 2021-01-18 ENCOUNTER — HOSPITAL ENCOUNTER (OUTPATIENT)
Age: 61
Setting detail: SPECIMEN
Discharge: HOME | End: 2021-01-18
Payer: COMMERCIAL

## 2021-01-18 ENCOUNTER — TELEPHONE (OUTPATIENT)
Dept: FAMILY MEDICINE CLINIC | Age: 61
End: 2021-01-18

## 2021-01-18 ENCOUNTER — OFFICE VISIT (OUTPATIENT)
Dept: FAMILY MEDICINE CLINIC | Age: 61
End: 2021-01-18
Payer: COMMERCIAL

## 2021-01-18 VITALS
BODY MASS INDEX: 35.6 KG/M2 | WEIGHT: 226.8 LBS | TEMPERATURE: 97.2 F | OXYGEN SATURATION: 94 % | SYSTOLIC BLOOD PRESSURE: 157 MMHG | DIASTOLIC BLOOD PRESSURE: 87 MMHG | HEIGHT: 67 IN | HEART RATE: 61 BPM

## 2021-01-18 DIAGNOSIS — Z12.11 COLON CANCER SCREENING: ICD-10-CM

## 2021-01-18 DIAGNOSIS — F17.209 TOBACCO USE DISORDER, CONTINUOUS: Chronic | ICD-10-CM

## 2021-01-18 DIAGNOSIS — F41.1 GENERALIZED ANXIETY DISORDER: Chronic | ICD-10-CM

## 2021-01-18 DIAGNOSIS — G47.00 INSOMNIA, UNSPECIFIED TYPE: ICD-10-CM

## 2021-01-18 DIAGNOSIS — F12.10 MARIJUANA ABUSE, CONTINUOUS: ICD-10-CM

## 2021-01-18 DIAGNOSIS — R31.9 URINARY TRACT INFECTION WITH HEMATURIA, SITE UNSPECIFIED: Primary | ICD-10-CM

## 2021-01-18 DIAGNOSIS — R31.9 URINARY TRACT INFECTION WITH HEMATURIA, SITE UNSPECIFIED: ICD-10-CM

## 2021-01-18 DIAGNOSIS — I10 ESSENTIAL HYPERTENSION: ICD-10-CM

## 2021-01-18 DIAGNOSIS — N39.0 URINARY TRACT INFECTION WITH HEMATURIA, SITE UNSPECIFIED: Primary | ICD-10-CM

## 2021-01-18 DIAGNOSIS — E66.9 CLASS 2 OBESITY WITHOUT SERIOUS COMORBIDITY WITH BODY MASS INDEX (BMI) OF 36.0 TO 36.9 IN ADULT, UNSPECIFIED OBESITY TYPE: ICD-10-CM

## 2021-01-18 DIAGNOSIS — K21.9 GASTROESOPHAGEAL REFLUX DISEASE, UNSPECIFIED WHETHER ESOPHAGITIS PRESENT: ICD-10-CM

## 2021-01-18 DIAGNOSIS — M54.2 CHRONIC NECK PAIN: ICD-10-CM

## 2021-01-18 DIAGNOSIS — F39 MOOD DISORDER (HCC): ICD-10-CM

## 2021-01-18 DIAGNOSIS — F43.10 PTSD (POST-TRAUMATIC STRESS DISORDER): ICD-10-CM

## 2021-01-18 DIAGNOSIS — R79.89 ELEVATED LFTS: ICD-10-CM

## 2021-01-18 DIAGNOSIS — G89.29 CHRONIC NECK PAIN: ICD-10-CM

## 2021-01-18 DIAGNOSIS — N39.0 URINARY TRACT INFECTION WITH HEMATURIA, SITE UNSPECIFIED: ICD-10-CM

## 2021-01-18 LAB
-: ABNORMAL
AMORPHOUS: ABNORMAL
BACTERIA: ABNORMAL
BILIRUBIN URINE: NEGATIVE
BILIRUBIN, POC: ABNORMAL
BLOOD URINE, POC: ABNORMAL
CASTS UA: ABNORMAL /LPF (ref 0–8)
CLARITY, POC: ABNORMAL
COLOR, POC: ABNORMAL
COLOR: ABNORMAL
COMMENT UA: ABNORMAL
CRYSTALS, UA: ABNORMAL /HPF
EPITHELIAL CELLS UA: ABNORMAL /HPF (ref 0–5)
GLUCOSE URINE, POC: ABNORMAL
GLUCOSE URINE: NEGATIVE
KETONES, POC: ABNORMAL
KETONES, URINE: NEGATIVE
LEUKOCYTE EST, POC: ABNORMAL
LEUKOCYTE ESTERASE, URINE: ABNORMAL
MUCUS: ABNORMAL
NITRITE, POC: POSITIVE
NITRITE, URINE: POSITIVE
OTHER OBSERVATIONS UA: ABNORMAL
PH UA: 8 (ref 5–8)
PH, POC: 8
PROTEIN UA: ABNORMAL
PROTEIN, POC: ABNORMAL
RBC UA: ABNORMAL /HPF (ref 0–4)
RENAL EPITHELIAL, UA: ABNORMAL /HPF
SPECIFIC GRAVITY UA: 1.02 (ref 1–1.03)
SPECIFIC GRAVITY, POC: 1.01
TRICHOMONAS: ABNORMAL
TURBIDITY: ABNORMAL
URINE HGB: ABNORMAL
UROBILINOGEN, POC: 0.2
UROBILINOGEN, URINE: NORMAL
WBC UA: ABNORMAL /HPF (ref 0–5)
YEAST: ABNORMAL

## 2021-01-18 PROCEDURE — 99214 OFFICE O/P EST MOD 30 MIN: CPT | Performed by: FAMILY MEDICINE

## 2021-01-18 PROCEDURE — 81003 URINALYSIS AUTO W/O SCOPE: CPT | Performed by: FAMILY MEDICINE

## 2021-01-18 RX ORDER — TIZANIDINE 2 MG/1
TABLET ORAL
OUTPATIENT
Start: 2021-01-18

## 2021-01-18 RX ORDER — CIPROFLOXACIN 250 MG/1
250 TABLET, FILM COATED ORAL 2 TIMES DAILY
Qty: 10 TABLET | Refills: 0 | Status: SHIPPED | OUTPATIENT
Start: 2021-01-18 | End: 2021-01-23

## 2021-01-18 RX ORDER — VERAPAMIL HYDROCHLORIDE 240 MG/1
240 TABLET, FILM COATED, EXTENDED RELEASE ORAL DAILY
Qty: 30 TABLET | Refills: 1 | Status: SHIPPED | OUTPATIENT
Start: 2021-01-18 | End: 2021-04-01

## 2021-01-18 ASSESSMENT — PATIENT HEALTH QUESTIONNAIRE - PHQ9
SUM OF ALL RESPONSES TO PHQ QUESTIONS 1-9: 6
1. LITTLE INTEREST OR PLEASURE IN DOING THINGS: 3
2. FEELING DOWN, DEPRESSED OR HOPELESS: 1
9. THOUGHTS THAT YOU WOULD BE BETTER OFF DEAD, OR OF HURTING YOURSELF: 0
SUM OF ALL RESPONSES TO PHQ QUESTIONS 1-9: 6
SUM OF ALL RESPONSES TO PHQ QUESTIONS 1-9: 6
SUM OF ALL RESPONSES TO PHQ QUESTIONS 1-9: 5
2. FEELING DOWN, DEPRESSED OR HOPELESS: 3
1. LITTLE INTEREST OR PLEASURE IN DOING THINGS: 1

## 2021-01-18 ASSESSMENT — ENCOUNTER SYMPTOMS
TROUBLE SWALLOWING: 0
COLOR CHANGE: 0
COUGH: 0
CONSTIPATION: 0
CHEST TIGHTNESS: 0
VOMITING: 0
SINUS PRESSURE: 0
ABDOMINAL DISTENTION: 0
BLOOD IN STOOL: 0
SHORTNESS OF BREATH: 0
EYE PAIN: 0
RECTAL PAIN: 0
EYE DISCHARGE: 0
DIARRHEA: 0
EYE REDNESS: 0
BACK PAIN: 0
NAUSEA: 0
ABDOMINAL PAIN: 1
VOICE CHANGE: 0
ANAL BLEEDING: 0

## 2021-01-18 NOTE — PROGRESS NOTES
Psychiatric/Behavioral: Negative for agitation, behavioral problems, confusion, decreased concentration, sleep disturbance and suicidal ideas. The patient is not nervous/anxious. Objective:   Physical Exam  Constitutional:       General: She is not in acute distress. HENT:      Head: Normocephalic and atraumatic. Right Ear: External ear normal.      Left Ear: External ear normal.   Eyes:      Conjunctiva/sclera: Conjunctivae normal.      Pupils: Pupils are equal, round, and reactive to light. Neck:      Musculoskeletal: Normal range of motion. Thyroid: No thyromegaly. Trachea: No tracheal deviation. Cardiovascular:      Rate and Rhythm: Normal rate and regular rhythm. Heart sounds: No murmur. No friction rub. No gallop. Pulmonary:      Effort: No respiratory distress. Breath sounds: No stridor. No wheezing or rales. Chest:      Chest wall: No tenderness. Abdominal:      General: Bowel sounds are normal. There is no distension. Palpations: Abdomen is soft. Tenderness: There is abdominal tenderness. There is no rebound. Comments: Lower abdo tender- slightly   Musculoskeletal: Normal range of motion. Lymphadenopathy:      Cervical: No cervical adenopathy. Skin:     General: Skin is warm. Coloration: Skin is not pale. Findings: No erythema or rash. Neurological:      Mental Status: She is alert and oriented to person, place, and time. Cranial Nerves: No cranial nerve deficit. Motor: No abnormal muscle tone. Deep Tendon Reflexes: Reflexes normal.   Psychiatric:         Behavior: Behavior normal.         Thought Content: Thought content normal.         Judgment: Judgment normal.      Comments: Tearful - states daughter  on  from heroin overdose         Assessment:       Diagnosis Orders   1.  Urinary tract infection with hematuria, site unspecified  Urinalysis Reflex to Culture    POCT Urinalysis No Micro (Auto)

## 2021-01-18 NOTE — TELEPHONE ENCOUNTER
Mayra Reina is calling to request a refill on the following medication(s):    Last Visit Date (If Applicable):  32/38/8017    Next Visit Date:    Visit date not found    Medication Request:  Requested Prescriptions     Pending Prescriptions Disp Refills    tiZANidine (ZANAFLEX) 2 MG tablet

## 2021-01-20 ENCOUNTER — TELEPHONE (OUTPATIENT)
Dept: GASTROENTEROLOGY | Age: 61
End: 2021-01-20

## 2021-01-20 DIAGNOSIS — Z12.11 COLON CANCER SCREENING: Primary | ICD-10-CM

## 2021-01-20 RX ORDER — POLYETHYLENE GLYCOL 3350 17 G/17G
POWDER, FOR SOLUTION ORAL
Qty: 238 G | Refills: 0 | Status: SHIPPED | OUTPATIENT
Start: 2021-01-20 | End: 2021-06-09 | Stop reason: ALTCHOICE

## 2021-01-20 RX ORDER — BISACODYL 5 MG
TABLET, DELAYED RELEASE (ENTERIC COATED) ORAL
Qty: 4 TABLET | Refills: 0 | Status: SHIPPED | OUTPATIENT
Start: 2021-01-20

## 2021-01-20 NOTE — TELEPHONE ENCOUNTER
Called pt regarding colon screen referral. Pt states she will not see Dr Wali Harris again; last colon with him in 2017. Pt informed she is allowed a one time switch to another provider. Pt scheduled with Dr Piter Jha for colon recall on 1/28/21 at 945am at [facility]. Covid 1/24/21 at 750am at [facility]. Miralax bowel prep given to pt over phone and emailed to Pending sale to [facility]. [email or web address]

## 2021-01-21 ENCOUNTER — TELEPHONE (OUTPATIENT)
Dept: FAMILY MEDICINE CLINIC | Age: 61
End: 2021-01-21

## 2021-01-22 LAB
CULTURE: ABNORMAL
Lab: ABNORMAL
SPECIMEN DESCRIPTION: ABNORMAL

## 2021-01-22 NOTE — TELEPHONE ENCOUNTER
Patient spouse called to cancel his wife's appointment for 1/24/2021 . Please  Return their call to [phone].  Thank You

## 2021-01-29 ENCOUNTER — TELEPHONE (OUTPATIENT)
Dept: NEUROSURGERY | Age: 61
End: 2021-01-29

## 2021-01-29 NOTE — TELEPHONE ENCOUNTER
Pts  called stating the medication that Dr Elizabeth Strickland ordered has been making pt extremely dizzy. Last night pt got up to answer door for  and was so dizzy that pt passed out and fell and hit her head on the coffee table. Pt did not take medication today and has been ok. Has had a headache for the last 10 days with no relief. Due to head injury, pt has been vomiting today.  will be taking pt into ER. H/O brain bleed and aneurysm.  Watrouskaylen Edward) can be reached at [phone] if needed. Pt and  wanted to update Dr Elizabeth Strickland as to whats going on.

## 2021-01-29 NOTE — TELEPHONE ENCOUNTER
Patient was started on magnesium oxide 400 mg daily for headache prophylaxis, this should not contribute to dizziness. Please isntruct patient to stop taking the medication if it is contributing to dizziness. Recommend getting evaluated in the ER stat to rule out brain bleed or any other etiologies contributing to the dizziness.     Thank you

## 2021-02-01 NOTE — TELEPHONE ENCOUNTER
Pt  called back states that she was seen at [facility] ER she had a CT scan of spine and brain.  I requested [facility].

## 2021-02-02 ENCOUNTER — TELEPHONE (OUTPATIENT)
Dept: FAMILY MEDICINE CLINIC | Age: 61
End: 2021-02-02

## 2021-02-02 NOTE — TELEPHONE ENCOUNTER
Pt called for a refill of flexeril 4 mg and stated that she fell from being dizzy from all the med's that you and her neuro doctor put her on I offered her a appt and she refused to come in due to having a lot of bills to pay for already

## 2021-02-05 ENCOUNTER — HOSPITAL ENCOUNTER (OUTPATIENT)
Dept: MRI IMAGING | Age: 61
Discharge: HOME | End: 2021-02-07
Payer: COMMERCIAL

## 2021-02-05 DIAGNOSIS — R51.9 WORSENING HEADACHES: ICD-10-CM

## 2021-02-05 DIAGNOSIS — R51.9 CHRONIC DAILY HEADACHE: ICD-10-CM

## 2021-02-05 LAB
CREAT SERPL-MCNC: 0.73 MG/DL (ref 0.5–0.9)
GFR AFRICAN AMERICAN: >60 ML/MIN
GFR NON-AFRICAN AMERICAN: >60 ML/MIN
GFR SERPL CREATININE-BSD FRML MDRD: NORMAL ML/MIN/{1.73_M2}
GFR SERPL CREATININE-BSD FRML MDRD: NORMAL ML/MIN/{1.73_M2}

## 2021-02-05 PROCEDURE — 70553 MRI BRAIN STEM W/O & W/DYE: CPT

## 2021-02-05 PROCEDURE — 6360000004 HC RX CONTRAST MEDICATION: Performed by: STUDENT IN AN ORGANIZED HEALTH CARE EDUCATION/TRAINING PROGRAM

## 2021-02-05 PROCEDURE — 36415 COLL VENOUS BLD VENIPUNCTURE: CPT

## 2021-02-05 PROCEDURE — A9579 GAD-BASE MR CONTRAST NOS,1ML: HCPCS | Performed by: STUDENT IN AN ORGANIZED HEALTH CARE EDUCATION/TRAINING PROGRAM

## 2021-02-05 PROCEDURE — 82565 ASSAY OF CREATININE: CPT

## 2021-02-05 RX ADMIN — GADOTERIDOL 20 ML: 279.3 INJECTION, SOLUTION INTRAVENOUS at 09:28

## 2021-02-08 ENCOUNTER — OFFICE VISIT (OUTPATIENT)
Dept: NEUROLOGY | Age: 61
End: 2021-02-08
Payer: COMMERCIAL

## 2021-02-08 VITALS
HEIGHT: 67 IN | OXYGEN SATURATION: 98 % | BODY MASS INDEX: 35.47 KG/M2 | HEART RATE: 82 BPM | DIASTOLIC BLOOD PRESSURE: 82 MMHG | SYSTOLIC BLOOD PRESSURE: 142 MMHG | WEIGHT: 226 LBS

## 2021-02-08 DIAGNOSIS — I67.1 CEREBRAL ANEURYSM: Primary | ICD-10-CM

## 2021-02-08 PROCEDURE — 99205 OFFICE O/P NEW HI 60 MIN: CPT | Performed by: PSYCHIATRY & NEUROLOGY

## 2021-02-08 RX ORDER — ERGOCALCIFEROL (VITAMIN D2) 1250 MCG
50000 CAPSULE ORAL WEEKLY
COMMUNITY

## 2021-02-08 NOTE — PROGRESS NOTES
Neurocritical Care, Stroke & Neurointerventional Note    [address]   [address] [place] [address], [facility] [address] , [address] [place] [address]   P: [phone]    Endovascular Neurosurgery Consult    Pt Name: Ernesto Magana  MRN: [id number]  YOB: 1960  Date of evaluation: 2/8/2021  Primary Care Physician: Vinay Quinn MD    Reason for evaluation: Cerebral Aneurysms     SUBJECTIVE:   History of Chief Complaint:    Ernesto Magana is a 61 y.o. female who presents with hx of chronic back pain, scoliosis, sciatica, and migraine headache. She presented with [facility], [facility] bleed in July of 2019. She had an angiogram showing left ICA aneurysms:    7/29/2021 Angiogram:     Impression:    1. There is a left-sided anterior genu cavernous ICA aneurysm noted measuring 2.06 x 1.49 x 1.4 mm. Also there is a left-sided posterior genu cavernous ICA aneurysm measuring 2.8 x 2.8 x 2.2 mm.   2. 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 3. There is no clear evidence of vasospasm or RCVS.     Allergies  is allergic to penicillins. Medications  Prior to Admission medications    Medication Sig Start Date End Date Taking? Authorizing Provider   ergocalciferol (ERGOCALCIFEROL) 1.25 MG (42552 UT) capsule Take 50,000 Units by mouth once a week   Yes Historical Provider, MD   polyethylene glycol (GLYCOLAX) 17 GM/SCOOP powder Follow instructions provided to you from physician's office. 1/20/21  Yes Shaan Springer MD   bisacodyl (BISACODYL) 5 MG EC tablet Follow instructions provided given by the physician's office.  1/20/21  Yes Shaan Springer MD   magnesium citrate solution Take 296 mLs by mouth once for 1 dose 1/20/21 2/8/21 Yes Shaan Springer MD   verapamil (CALAN SR) 240 MG extended release tablet Take 1 tablet by mouth daily 1/18/21  Yes Bridgette Ding MD   magnesium oxide (MAG-OX) 400 MG tablet Take 1 tablet by mouth daily 1/14/21 2/13/21 Yes Ezio Day MD naproxen sodium (ANAPROX) 550 MG tablet Take 1 tablet by mouth 2 times daily as needed for Pain (Headache) 1/14/21  Yes Contreras Leigh MD   sertraline (ZOLOFT) 100 MG tablet Take 1.5 tablets by mouth daily TAKE 1 TABLET DAILY 12/1/20  Yes Derrell Hart MD   dicyclomine (BENTYL) 20 MG tablet Take 1 tablet by mouth every 6 hours 11/11/20  Yes Gretchen Yeh PA-C   ondansetron (ZOFRAN ODT) 4 MG disintegrating tablet Take 1 tablet by mouth every 8 hours as needed for Nausea 11/11/20  Yes Gretchen Yeh PA-C   gabapentin (NEURONTIN) 600 MG tablet TAKE 1 TABLET TWICE A DAY 8/28/20 2/8/21 Yes Kenzie Tuttle DO   prazosin (MINIPRESS) 1 MG capsule Take 1 capsule by mouth nightly 8/26/20  Yes Kenzie Tuttle DO   omeprazole (PRILOSEC) 40 MG delayed release capsule TAKE 1 CAPSULE DAILY 7/1/20  Yes Kenzie Tuttle DO   albuterol sulfate HFA (VENTOLIN HFA) 108 (90 Base) MCG/ACT inhaler Inhale 2 puffs into the lungs 4 times daily as needed for Wheezing 7/1/20  Yes Kenzie Tuttle DO   butalbital-acetaminophen-caffeine (FIORICET, ESGIC) -40 MG per tablet Take 1 tab only for severe headache. Can repeat after 4 hrs if needed  Max 2 tabs/24 hrs. Max 4 tabs/week  Patient not taking: Reported on 2/8/2021 1/14/21   Contreras Leigh MD    Scheduled Meds:  Continuous Infusions:  PRN Meds:.  Past Medical History   has a past medical history of Adenomatous polyp, Arthritis, Chronic prescription opiate use, Depression, Generalized anxiety disorder, Hyperlipidemia, Methadone dependence ([address] Severe episode of recurrent major depressive disorder, without psychotic features ([address] Subarachnoid hemorrhage ([address] and Tobacco use disorder, continuous.   Past Surgical History has a past surgical history that includes back surgery; knee surgery (Bilateral); Ankle fracture surgery (Left); Tubal ligation; Foot surgery; Appendectomy; pr colonoscopy w/biopsy single/multiple (N/A, 4/24/2017); Upper gastrointestinal endoscopy (05/02/2017); pr egd transoral biopsy single/multiple (N/A, 5/2/2017); sinus surgery; Dilation and curettage of uterus; Colonoscopy (05/04/2017); pr colonoscopy w/biopsy single/multiple (N/A, 5/4/2017); and Colonoscopy (04/24/2017). Social History   reports that she has been smoking cigarettes. She has a 40.00 pack-year smoking history. She has never used smokeless tobacco.   reports no history of alcohol use. reports current drug use. Drug: Marijuana. Family History  family history includes Diabetes in her father; Mental Illness in her daughter; Substance Abuse in her daughter and daughter. Review of Systems:  CONSTITUTIONAL:  negative for fevers, chills, fatigue and malaise    EYES:  negative for double vision, blurred vision and photophobia     HEENT:  negative for tinnitus, epistaxis and sore throat    RESPIRATORY:  negative for cough, shortness of breath, wheezing    CARDIOVASCULAR:  negative for chest pain, palpitations, syncope, edema    GASTROINTESTINAL:  negative for nausea, vomiting    GENITOURINARY:  negative for incontinence    MUSCULOSKELETAL:  negative for neck or back pain    NEUROLOGICAL:  Negative for weakness and tingling  negative for headaches and dizziness    PSYCHIATRIC:  negative for anxiety      Review of systems otherwise negative. OBJECTIVE:   Vitals: BP (!) 142/82 (Site: Right Upper Arm, Position: Sitting, Cuff Size: Medium Adult)   Pulse 82   Ht 5' 7\" (1.702 m)   Wt 226 lb (102.5 kg)   SpO2 98%   BMI 35.40 kg/m²   General appearance: Lying in bed, NAD  HEENT: Head: Normocephalic, no lesions, without obvious abnormality. Neck: no adenopathy, no carotid bruit, no JVD, supple, symmetrical, trachea midline and thyroid not enlarged, symmetric, no tenderness/mass/nodules  Lungs: clear to auscultation bilaterally  Heart: regular rate and rhythm, S1, S2 normal, no murmur, click, rub or gallop  Abdomen: soft, non-tender; nondistended, bowel sounds normal  Extremities: extremities normal, atraumatic, no cyanosis or edema    Neurologic: Mental status: Alert, oriented, thought content appropriate, Alert and oriented x 3,   Language/speech: intact language, attention, knowledge  CN: II-XII intact  MOTOR: 5/5 throughout with normal tone and bulk for age  SENSORY: LT and PP symmetrical and intact  COORDINATION: F to N and Gait intact for age    LABS:   No results for input(s): WBC, HGB, HCT, PLT, NA, K, CL, CO2, BUN, CREATININE, MG, PHOS, CALCIUM, PTT, INR, AST, ALT, BILITOT, BILIDIR, NITRU, COLORU, BACTERIA in the last 72 hours. Invalid input(s): PT, WBCU, RBCU, LEUKOCYTESUA  No results for input(s): ALKPHOS, ALT, AST, BILITOT, BILIDIR, LABALBU, AMYLASE, LIPASE in the last 72 hours. RADIOLOGY:   Images were personally reviewed includin2021      IMPRESSIONS:   Royal Bhat is a 61 y.o. female who presents with hx of chronic back pain, scoliosis, sciatica, and migraine headache. She presented with [facility], [facility] in 2019. She had an angiogram showing left ICA aneurysms:    2021 Angiogram:     Impression:    1. There is a left-sided anterior genu cavernous ICA aneurysm noted measuring 2.06 x 1.49 x 1.4 mm. Also there is a left-sided posterior genu cavernous ICA aneurysm measuring 2.8 x 2.8 x 2.2 mm.   2. 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 3. There is no clear evidence of vasospasm or RCVS.     1. does not have any pertinent problems on file. PLANS:   1. MRA head without Joseluis  2. Consultation Visit Time:  60 minutes  Patient given educational materials - see patient instructions. Discussed use, benefit, and side effects of prescribed medications. Personally reviewed imaging with patients and all questions answered. Pt voiced understanding. Patient agreed with treatment plan. Follow up as directed below. Greater than 50% of the time was for counseling and providing answer to the patient question. The findings and the plan discussed with the patient and all her questions were answered. Thank you very much for your referral, please do not hesitate to contact me with any questions.     Kevan Del Real MD Pager: [phone]  Stroke, [facility] Stroke [address]  Pager [phone]  Electronically signed 2/8/2021 at 1:58 PM

## 2021-02-09 ENCOUNTER — TELEPHONE (OUTPATIENT)
Dept: NEUROLOGY | Age: 61
End: 2021-02-09

## 2021-02-09 NOTE — TELEPHONE ENCOUNTER
Pt has a issue with MRA , She states that she is claustrophobic and needs ativan or she would like to be put to sleep to have this test done

## 2021-02-15 ENCOUNTER — TELEPHONE (OUTPATIENT)
Dept: FAMILY MEDICINE CLINIC | Age: 61
End: 2021-02-15

## 2021-02-15 DIAGNOSIS — G89.4 CHRONIC PAIN SYNDROME: Primary | ICD-10-CM

## 2021-02-15 NOTE — TELEPHONE ENCOUNTER
Pt  call and stated that the pt would like a referral to pain management the one on [address] comprehensive pain

## 2021-03-01 ENCOUNTER — TELEPHONE (OUTPATIENT)
Dept: NEUROLOGY | Age: 61
End: 2021-03-01

## 2021-03-06 ENCOUNTER — APPOINTMENT (OUTPATIENT)
Dept: GENERAL RADIOLOGY | Age: 61
End: 2021-03-06
Payer: COMMERCIAL

## 2021-03-06 ENCOUNTER — APPOINTMENT (OUTPATIENT)
Dept: CT IMAGING | Age: 61
End: 2021-03-06
Payer: COMMERCIAL

## 2021-03-06 ENCOUNTER — HOSPITAL ENCOUNTER (EMERGENCY)
Age: 61
Discharge: HOME | End: 2021-03-06
Attending: EMERGENCY MEDICINE
Payer: COMMERCIAL

## 2021-03-06 VITALS
TEMPERATURE: 97.5 F | SYSTOLIC BLOOD PRESSURE: 176 MMHG | RESPIRATION RATE: 22 BRPM | OXYGEN SATURATION: 94 % | HEART RATE: 84 BPM | DIASTOLIC BLOOD PRESSURE: 93 MMHG

## 2021-03-06 DIAGNOSIS — G89.29 CHRONIC NONINTRACTABLE HEADACHE, UNSPECIFIED HEADACHE TYPE: Primary | ICD-10-CM

## 2021-03-06 DIAGNOSIS — R51.9 CHRONIC NONINTRACTABLE HEADACHE, UNSPECIFIED HEADACHE TYPE: Primary | ICD-10-CM

## 2021-03-06 LAB
ABSOLUTE EOS #: 0.17 K/UL (ref 0–0.44)
ABSOLUTE IMMATURE GRANULOCYTE: 0.03 K/UL (ref 0–0.3)
ABSOLUTE LYMPH #: 2.51 K/UL (ref 1.1–3.7)
ABSOLUTE MONO #: 0.42 K/UL (ref 0.1–1.2)
ANION GAP SERPL CALCULATED.3IONS-SCNC: 13 MMOL/L (ref 9–17)
BASOPHILS # BLD: 1 % (ref 0–2)
BASOPHILS ABSOLUTE: 0.06 K/UL (ref 0–0.2)
BUN BLDV-MCNC: 14 MG/DL (ref 8–23)
BUN/CREAT BLD: 15 (ref 9–20)
CALCIUM SERPL-MCNC: 9.5 MG/DL (ref 8.6–10.4)
CHLORIDE BLD-SCNC: 102 MMOL/L (ref 98–107)
CO2: 24 MMOL/L (ref 20–31)
CREAT SERPL-MCNC: 0.93 MG/DL (ref 0.5–0.9)
DIFFERENTIAL TYPE: NORMAL
EOSINOPHILS RELATIVE PERCENT: 3 % (ref 1–4)
GFR AFRICAN AMERICAN: >60 ML/MIN
GFR NON-AFRICAN AMERICAN: >60 ML/MIN
GFR SERPL CREATININE-BSD FRML MDRD: ABNORMAL ML/MIN/{1.73_M2}
GFR SERPL CREATININE-BSD FRML MDRD: ABNORMAL ML/MIN/{1.73_M2}
GLUCOSE BLD-MCNC: 129 MG/DL (ref 70–99)
HCT VFR BLD CALC: 44.7 % (ref 36.3–47.1)
HEMOGLOBIN: 14.6 G/DL (ref 11.9–15.1)
IMMATURE GRANULOCYTES: 0 %
LYMPHOCYTES # BLD: 37 % (ref 24–43)
MCH RBC QN AUTO: 30.4 PG (ref 25.2–33.5)
MCHC RBC AUTO-ENTMCNC: 32.7 G/DL (ref 28.4–34.8)
MCV RBC AUTO: 93.1 FL (ref 82.6–102.9)
MONOCYTES # BLD: 6 % (ref 3–12)
NRBC AUTOMATED: 0 PER 100 WBC
PDW BLD-RTO: 13 % (ref 11.8–14.4)
PLATELET # BLD: 210 K/UL (ref 138–453)
PLATELET ESTIMATE: NORMAL
PMV BLD AUTO: 10.7 FL (ref 8.1–13.5)
POTASSIUM SERPL-SCNC: 4 MMOL/L (ref 3.7–5.3)
RBC # BLD: 4.8 M/UL (ref 3.95–5.11)
RBC # BLD: NORMAL 10*6/UL
SEG NEUTROPHILS: 53 % (ref 36–65)
SEGMENTED NEUTROPHILS ABSOLUTE COUNT: 3.65 K/UL (ref 1.5–8.1)
SODIUM BLD-SCNC: 139 MMOL/L (ref 135–144)
WBC # BLD: 6.8 K/UL (ref 3.5–11.3)
WBC # BLD: NORMAL 10*3/UL

## 2021-03-06 PROCEDURE — 85025 COMPLETE CBC W/AUTO DIFF WBC: CPT

## 2021-03-06 PROCEDURE — 72125 CT NECK SPINE W/O DYE: CPT

## 2021-03-06 PROCEDURE — 70450 CT HEAD/BRAIN W/O DYE: CPT

## 2021-03-06 PROCEDURE — 71101 X-RAY EXAM UNILAT RIBS/CHEST: CPT

## 2021-03-06 PROCEDURE — 96374 THER/PROPH/DIAG INJ IV PUSH: CPT

## 2021-03-06 PROCEDURE — 6360000002 HC RX W HCPCS: Performed by: EMERGENCY MEDICINE

## 2021-03-06 PROCEDURE — 80048 BASIC METABOLIC PNL TOTAL CA: CPT

## 2021-03-06 PROCEDURE — 99283 EMERGENCY DEPT VISIT LOW MDM: CPT

## 2021-03-06 PROCEDURE — 2580000003 HC RX 258: Performed by: EMERGENCY MEDICINE

## 2021-03-06 PROCEDURE — 96375 TX/PRO/DX INJ NEW DRUG ADDON: CPT

## 2021-03-06 RX ORDER — DIPHENHYDRAMINE HYDROCHLORIDE 50 MG/ML
25 INJECTION INTRAMUSCULAR; INTRAVENOUS ONCE
Status: COMPLETED | OUTPATIENT
Start: 2021-03-06 | End: 2021-03-06

## 2021-03-06 RX ORDER — MORPHINE SULFATE 4 MG/ML
4 INJECTION, SOLUTION INTRAMUSCULAR; INTRAVENOUS ONCE
Status: COMPLETED | OUTPATIENT
Start: 2021-03-06 | End: 2021-03-06

## 2021-03-06 RX ORDER — METOCLOPRAMIDE HYDROCHLORIDE 5 MG/ML
10 INJECTION INTRAMUSCULAR; INTRAVENOUS ONCE
Status: COMPLETED | OUTPATIENT
Start: 2021-03-06 | End: 2021-03-06

## 2021-03-06 RX ORDER — KETOROLAC TROMETHAMINE 30 MG/ML
30 INJECTION, SOLUTION INTRAMUSCULAR; INTRAVENOUS ONCE
Status: COMPLETED | OUTPATIENT
Start: 2021-03-06 | End: 2021-03-06

## 2021-03-06 RX ORDER — 0.9 % SODIUM CHLORIDE 0.9 %
500 INTRAVENOUS SOLUTION INTRAVENOUS ONCE
Status: COMPLETED | OUTPATIENT
Start: 2021-03-06 | End: 2021-03-06

## 2021-03-06 RX ORDER — METHYLPREDNISOLONE SODIUM SUCCINATE 40 MG/ML
40 INJECTION, POWDER, LYOPHILIZED, FOR SOLUTION INTRAMUSCULAR; INTRAVENOUS ONCE
Status: COMPLETED | OUTPATIENT
Start: 2021-03-06 | End: 2021-03-06

## 2021-03-06 RX ADMIN — DIPHENHYDRAMINE HYDROCHLORIDE 25 MG: 50 INJECTION, SOLUTION INTRAMUSCULAR; INTRAVENOUS at 20:36

## 2021-03-06 RX ADMIN — KETOROLAC TROMETHAMINE 30 MG: 30 INJECTION, SOLUTION INTRAMUSCULAR at 21:16

## 2021-03-06 RX ADMIN — SODIUM CHLORIDE 500 ML: 9 INJECTION, SOLUTION INTRAVENOUS at 20:36

## 2021-03-06 RX ADMIN — METHYLPREDNISOLONE SODIUM SUCCINATE 40 MG: 40 INJECTION, POWDER, FOR SOLUTION INTRAMUSCULAR; INTRAVENOUS at 20:36

## 2021-03-06 RX ADMIN — MORPHINE SULFATE 4 MG: 4 INJECTION, SOLUTION INTRAMUSCULAR; INTRAVENOUS at 20:36

## 2021-03-06 RX ADMIN — METOCLOPRAMIDE 10 MG: 5 INJECTION, SOLUTION INTRAMUSCULAR; INTRAVENOUS at 20:36

## 2021-03-06 ASSESSMENT — PAIN SCALES - GENERAL
PAINLEVEL_OUTOF10: 10
PAINLEVEL_OUTOF10: 10

## 2021-03-06 ASSESSMENT — ENCOUNTER SYMPTOMS
CHEST TIGHTNESS: 0
SHORTNESS OF BREATH: 0
PHOTOPHOBIA: 1
APNEA: 0
COLOR CHANGE: 0
DIARRHEA: 0
ABDOMINAL DISTENTION: 0
VOMITING: 0
CONSTIPATION: 0
SINUS PAIN: 0
NAUSEA: 1

## 2021-03-06 ASSESSMENT — PAIN DESCRIPTION - PROGRESSION: CLINICAL_PROGRESSION: GRADUALLY IMPROVING

## 2021-03-07 NOTE — ED TRIAGE NOTES
Patient presents with c/o migraine headache. Reports hx of migraines and subarachnoid hemorrhage July 2020. Patient with daily headaches. Appears to be in pain upon exam. Crying and holding head on stretcher.

## 2021-03-07 NOTE — ED PROVIDER NOTES
EMERGENCY DEPARTMENT ENCOUNTER    Pt Name: Josh Olivarez  MRN: [id number]  [place] 1960  Date of evaluation: 3/6/21  CHIEF COMPLAINT       Chief Complaint   Patient presents with    Headache     HISTORY OF PRESENT ILLNESS   77-year-old female presenting to the emergency room for acute on chronic migraine. Patient suffered a subarachnoid hemorrhage back in July 2020 and has had issues with persistent headaches since that time. Patient reports that she has a headache daily. It ranges in severity but she reports that she has never pain-free. She had fallen about 3 to 4 weeks ago hitting her head on part of the coffee table. Patient did not seek medical evaluation at that time and has had severe headaches since then. She reports that nothing she has taken at home helps with the pain. She reports that previous migraines have responded somewhat to Dilaudid. No fevers at home. Patient reports light sensitivity and nausea. REVIEW OF SYSTEMS     Review of Systems   Constitutional: Negative for activity change, chills and diaphoresis. HENT: Negative for congestion, sinus pain and tinnitus. Eyes: Positive for photophobia. Respiratory: Negative for apnea, chest tightness and shortness of breath. Gastrointestinal: Positive for nausea. Negative for abdominal distention, constipation, diarrhea and vomiting. Genitourinary: Negative for difficulty urinating and frequency. Musculoskeletal: Negative for arthralgias and myalgias. Skin: Negative for color change and rash. Neurological: Positive for headaches. Negative for dizziness. Hematological: Negative. Psychiatric/Behavioral: Positive for dysphoric mood.        PASTMEDICAL HISTORY     Past Medical History:   Diagnosis Date    Adenomatous polyp 5/1/2017    Arthritis     DDD    Chronic prescription opiate use 10/11/2018    Depression     Generalized anxiety disorder 1/22/2016    Hyperlipidemia     [facility]adone dependence [facility] 1/22/2016    Severe episode of recurrent major depressive disorder, without psychotic features ([address]     Subarachnoid hemorrhage ([address] 7/28/2019    Tobacco use disorder, continuous 1/22/2016     Past Problem List  Patient Active Problem List   Diagnosis Code    Tobacco use disorder, continuous F17.209    Generalized anxiety disorder F41.1    Chronic bilateral low back pain with bilateral sciatica M54.42, M54.41, G89.29    Insomnia G47.00    Essential hypertension I10    Mixed hyperlipidemia E78.2    Chronic neck pain M54.2, G89.29    Chronic pain syndrome G89.4    Mood disorder ([address] F39    PTSD (post-traumatic stress disorder) F43.10    Marijuana abuse, continuous F12.10    History of back surgery Z98.890    Class 2 obesity in adult E66.9    GERD (gastroesophageal reflux disease) K21.9    History of colonoscopy with polypectomy Z98.890, Z86.010    Diverticulosis of colon K57.30    Elevated LFTs R79.89     SURGICAL HISTORY       Past Surgical History:   Procedure Laterality Date    ANKLE FRACTURE SURGERY Left     APPENDECTOMY      BACK SURGERY      fusion, hardware. Dr. Jazzmine Gonzales    COLONOSCOPY  05/04/2017    HYPERPLASTIC POLYP       COLONOSCOPY  04/24/2017    ADENOMATOUS POLYP.      DILATION AND CURETTAGE OF UTERUS      FOOT SURGERY      KNEE SURGERY Bilateral     TX COLONOSCOPY W/BIOPSY SINGLE/MULTIPLE N/A 4/24/2017    COLONOSCOPY WITH BIOPSY performed by Rachna Real MD at . [place] [address] W/BIOPSY SINGLE/MULTIPLE N/A 5/4/2017    COLONOSCOPY WITH BIOPSY performed by Rachna Real MD at [address] [place] [address] EGD TRANSORAL BIOPSY SINGLE/MULTIPLE N/A 5/2/2017    EGD BIOPSY performed by Rachna Real MD at [facility] ENDOSCOPY  05/02/2017     CURRENT MEDICATIONS       Discharge Medication List as of 3/6/2021 10:09 PM      CONTINUE these medications which have NOT CHANGED    Details   ergocalciferol (ERGOCALCIFEROL) 1.25 MG (02018 UT) capsule Take 50,000 Units by mouth once a weekHistorical Med      polyethylene glycol (GLYCOLAX) 17 GM/SCOOP powder Follow instructions provided to you from physician's office. , Disp-238 g, R-0Normal      bisacodyl (BISACODYL) 5 MG EC tablet Follow instructions provided given by the physician's office. , Disp-4 tablet, R-0Normal      magnesium citrate solution Take 296 mLs by mouth once for 1 dose, Disp-296 mL, R-0Normal      verapamil (CALAN SR) 240 MG extended release tablet Take 1 tablet by mouth daily, Disp-30 tablet, R-1Normal      naproxen sodium (ANAPROX) 550 MG tablet Take 1 tablet by mouth 2 times daily as needed for Pain (Headache), Disp-28 tablet, R-3Normal      butalbital-acetaminophen-caffeine (FIORICET, ESGIC) -40 MG per tablet Take 1 tab only for severe headache. Can repeat after 4 hrs if needed  Max 2 tabs/24 hrs. Max 4 tabs/week, Disp-14 tablet, R-1, DAWNormal      sertraline (ZOLOFT) 100 MG tablet Take 1.5 tablets by mouth daily TAKE 1 TABLET DAILY, Disp-45 tablet, R-0Normal      dicyclomine (BENTYL) 20 MG tablet Take 1 tablet by mouth every 6 hours, Disp-40 tablet,R-0Print      ondansetron (ZOFRAN ODT) 4 MG disintegrating tablet Take 1 tablet by mouth every 8 hours as needed for Nausea, Disp-20 tablet,R-0Print      gabapentin (NEURONTIN) 600 MG tablet TAKE 1 TABLET TWICE A DAY, Disp-90 tablet, R-8Normal      prazosin (MINIPRESS) 1 MG capsule Take 1 capsule by mouth nightly, Disp-30 capsule,R-5Normal      omeprazole (PRILOSEC) 40 MG delayed release capsule TAKE 1 CAPSULE DAILY, Disp-90 capsule, R-3Normal      albuterol sulfate HFA (VENTOLIN HFA) 108 (90 Base) MCG/ACT inhaler Inhale 2 puffs into the lungs 4 times daily as needed for Wheezing, Disp-3 Inhaler, R-1Normal           ALLERGIES     is allergic to penicillins. FAMILY HISTORY     She indicated that her mother is . She indicated that her father is .  She indicated that two of her three sisters are alive. She indicated that her maternal grandmother is . She indicated that her maternal grandfather is . She indicated that her paternal grandmother is . She indicated that her paternal grandfather is . She indicated that all of her three daughters are alive. SOCIAL HISTORY       Social History     Tobacco Use    Smoking status: Current Every Day Smoker     Packs/day: 1.00     Years: 40.00     Pack years: 40.00     Types: Cigarettes    Smokeless tobacco: Never Used    Tobacco comment: almost off cigarettes, but now vaping. Substance Use Topics    Alcohol use: No     Alcohol/week: 0.0 standard drinks     Comment: denies any heavy use.  Drug use: Yes     Types: Marijuana     Comment: Medical marijuana card verified. once a day     PHYSICAL EXAM     INITIAL VITALS: BP (!) 176/93   Pulse 84   Temp 97.5 °F (36.4 °C) (Oral)   Resp 22   SpO2 94%    Physical Exam  Constitutional:       General: She is not in acute distress. Appearance: She is well-developed. Comments: Patient appears uncomfortable holding her head and crying   HENT:      Head: Normocephalic. Eyes:      Pupils: Pupils are equal, round, and reactive to light. Cardiovascular:      Rate and Rhythm: Normal rate and regular rhythm. Heart sounds: Normal heart sounds. Pulmonary:      Effort: Pulmonary effort is normal. No respiratory distress. Breath sounds: Normal breath sounds. Abdominal:      General: Bowel sounds are normal.      Palpations: Abdomen is soft. Tenderness: There is no abdominal tenderness. Musculoskeletal: Normal range of motion. Skin:     General: Skin is warm and dry. Neurological:      Mental Status: She is alert and oriented to person, place, and time. MEDICAL DECISION MAKIN-year-old female presenting to the emergency room for acute on chronic migraine headache.   Patient has normal neurologic exam here in the emergency room.  She reports no fevers at home and has no fever here. Other than slightly elevated blood pressure vitals are stable. Patient does not have elevated white count. CT imaging does not show any space-occupying lesion or blood products. Patient symptomatically improved here in the ED after IV pain medications. Patient stating she is feeling well enough for discharge. Given her history and exam today do not feel patient requires urgent intervention or admission. Patient instructed to follow-up with neurology. CRITICAL CARE:       PROCEDURES:    Procedures    DIAGNOSTIC RESULTS   EKG:All EKG's are interpreted by the Emergency Department Physician who either signs or Co-signs this chart in the absence of a cardiologist.        RADIOLOGY:All plain film, CT, MRI, and formal ultrasound images (except ED bedside ultrasound) are read by the radiologist, see reports below, unless otherwisenoted in MDM or here. XR RIBS LEFT INCLUDE CHEST (MIN 3 VIEWS)   Final Result   No acute cardiopulmonary disease. Unremarkable left ribs. CT CERVICAL SPINE WO CONTRAST   Final Result   No acute abnormality of the cervical spine. CT HEAD WO CONTRAST   Final Result   No acute intracranial abnormality. LABS: All lab results were reviewed by myself, and all abnormals are listed below.   Labs Reviewed   BASIC METABOLIC PANEL W/ REFLEX TO MG FOR LOW K - Abnormal; Notable for the following components:       Result Value    Glucose 129 (*)     CREATININE 0.93 (*)     All other components within normal limits   CBC WITH AUTO DIFFERENTIAL       EMERGENCY DEPARTMENTCOURSE:         Vitals:    Vitals:    03/06/21 1958   BP: (!) 176/93   Pulse: 84   Resp: 22   Temp: 97.5 °F (36.4 °C)   TempSrc: Oral   SpO2: 94%       The patient was given the following medications while in the emergency department:  Orders Placed This Encounter   Medications    diphenhydrAMINE (BENADRYL) injection 25 mg    metoclopramide (REGLAN) injection 10 mg    morphine sulfate (PF) injection 4 mg    methylPREDNISolone sodium (SOLU-MEDROL) injection 40 mg    0.9 % sodium chloride bolus    ketorolac (TORADOL) injection 30 mg     CONSULTS:  None    FINAL IMPRESSION      1. Chronic nonintractable headache, unspecified headache type          DISPOSITION/PLAN   DISPOSITION Decision To Discharge 03/06/2021 10:09:05 PM      PATIENT REFERRED TO:  No follow-up provider specified.   DISCHARGE MEDICATIONS:  Discharge Medication List as of 3/6/2021 10:09 PM        Bijan Singleton MD  Attending Emergency Physician                  Betsy Pina MD  03/06/21 5926

## 2021-03-08 ENCOUNTER — TELEPHONE (OUTPATIENT)
Dept: NEUROSURGERY | Age: 61
End: 2021-03-08

## 2021-03-08 ENCOUNTER — TELEPHONE (OUTPATIENT)
Dept: FAMILY MEDICINE CLINIC | Age: 61
End: 2021-03-08

## 2021-03-08 RX ORDER — NAPROXEN SODIUM 550 MG/1
550 TABLET ORAL 2 TIMES DAILY PRN
Qty: 14 TABLET | Refills: 3 | Status: SHIPPED
Start: 2021-03-08 | End: 2021-03-31 | Stop reason: SDUPTHER

## 2021-03-08 RX ORDER — PROMETHAZINE HYDROCHLORIDE 12.5 MG/1
12.5 TABLET ORAL 2 TIMES DAILY PRN
Qty: 28 TABLET | Refills: 2 | Status: SHIPPED | OUTPATIENT
Start: 2021-03-08 | End: 2021-07-08 | Stop reason: ALTCHOICE

## 2021-03-08 RX ORDER — DIPHENHYDRAMINE HCL 25 MG
25 CAPSULE ORAL 2 TIMES DAILY PRN
Qty: 28 CAPSULE | Refills: 2 | Status: SHIPPED | OUTPATIENT
Start: 2021-03-08 | End: 2021-07-08 | Stop reason: ALTCHOICE

## 2021-03-08 NOTE — TELEPHONE ENCOUNTER
Pts  called stating he took the pt to the ER for a severe migraine on 3/6 (BP was 176/93 in ER). States pt has had this migraine for 10+ days. States pt has been \"ok\" for the past few days but states pt woke up this morning with a horrible migraine. States pt has been vomiting due to the pain. States pt is in the living room with a heating pad on her neck.  states he has to work at 2 and wants to make sure pt is stable for him to go to work.  states that the medication pt was given in the ER knocked migraine out. States pt woke up and took meds but vomited them up. Pt is scheduled for her MRA (Dr Jhony Akers ordered) on 3/10. Please review and advise.

## 2021-03-08 NOTE — TELEPHONE ENCOUNTER
Spoke to patient's  as per him patient is having severe migraine headaches, she was taken to the ER on 3/6/2021. CT head without contrast was done which did not show any acute intracranial abnormality, no hemorrhage. CT cervical spine without contrast did not show any acute cervical spine abnormality. As per patient this headache is similar to her prior migraine headaches, but severe intensity. She tried taking Fioricet which did not help with her headache. Did not try naproxen sodium. Instructed patient that if she feels her headache is not improving or is severe recommend going to the ER again to rule out recurrence of hemorrhage    If she feels her headache is similar to her prior migraine headaches will prescribe naproxen sodium 550 mg twice daily along with Benadryl 25 mg and Phenergan 12.5 mg twice daily to be taken with naproxen sodium. Prescription sent to [facility]. Patient was given bilateral occipital nerve block in January 2020, denies any improvement in the headaches with that.

## 2021-03-10 ENCOUNTER — HOSPITAL ENCOUNTER (OUTPATIENT)
Dept: MRI IMAGING | Age: 61
Discharge: HOME | End: 2021-03-12
Payer: COMMERCIAL

## 2021-03-10 ENCOUNTER — TELEPHONE (OUTPATIENT)
Dept: NEUROLOGY | Age: 61
End: 2021-03-10

## 2021-03-10 DIAGNOSIS — I67.1 CEREBRAL ANEURYSM: ICD-10-CM

## 2021-03-10 PROCEDURE — 6360000004 HC RX CONTRAST MEDICATION: Performed by: PSYCHIATRY & NEUROLOGY

## 2021-03-10 PROCEDURE — 70546 MR ANGIOGRAPH HEAD W/O&W/DYE: CPT

## 2021-03-10 PROCEDURE — A9579 GAD-BASE MR CONTRAST NOS,1ML: HCPCS | Performed by: PSYCHIATRY & NEUROLOGY

## 2021-03-10 RX ADMIN — GADOTERIDOL 20 ML: 279.3 INJECTION, SOLUTION INTRAVENOUS at 07:24

## 2021-03-10 NOTE — TELEPHONE ENCOUNTER
Pt  called, [facility] is having increased pain, and the medication is not working. Also wanted you to know that her MRA was done this morning please advise. Please advise.

## 2021-03-10 NOTE — TELEPHONE ENCOUNTER
Called the patient and left a voicemail to call the clinic back to discuss further regarding her headaches and MRA head finding.     Thank you

## 2021-03-16 RX ORDER — CYCLOBENZAPRINE HCL 10 MG
10 TABLET ORAL 2 TIMES DAILY PRN
Qty: 21 TABLET | Refills: 2 | Status: SHIPPED | OUTPATIENT
Start: 2021-03-16 | End: 2021-05-11 | Stop reason: SDUPTHER

## 2021-03-18 RX ORDER — PRAZOSIN HYDROCHLORIDE 1 MG/1
1 CAPSULE ORAL NIGHTLY
Qty: 30 CAPSULE | Refills: 5 | Status: SHIPPED | OUTPATIENT
Start: 2021-03-18

## 2021-03-19 NOTE — TELEPHONE ENCOUNTER
Patient calling for refill of butalbital-acetaminophen-caffeine (FIORICET, ESGIC) -40 MG    Date of last fill: 1.14.21    Date of next follow up appointment: 7.15.2021    Pt notified response time for refills is 24-48 hours

## 2021-03-23 RX ORDER — BUTALBITAL, ACETAMINOPHEN AND CAFFEINE 50; 325; 40 MG/1; MG/1; MG/1
TABLET ORAL
Qty: 14 TABLET | Refills: 1 | Status: SHIPPED | OUTPATIENT
Start: 2021-03-23 | End: 2021-05-05 | Stop reason: SDUPTHER

## 2021-03-26 ENCOUNTER — IMMUNIZATION (OUTPATIENT)
Dept: FAMILY MEDICINE CLINIC | Age: 61
End: 2021-03-26
Payer: COMMERCIAL

## 2021-03-26 PROCEDURE — 91300 COVID-19, PFIZER VACCINE 30MCG/0.3ML DOSE: CPT | Performed by: INTERNAL MEDICINE

## 2021-03-26 PROCEDURE — 0001A COVID-19, PFIZER VACCINE 30MCG/0.3ML DOSE: CPT | Performed by: INTERNAL MEDICINE

## 2021-03-31 ENCOUNTER — OFFICE VISIT (OUTPATIENT)
Dept: FAMILY MEDICINE CLINIC | Age: 61
End: 2021-03-31
Payer: COMMERCIAL

## 2021-03-31 VITALS
TEMPERATURE: 97 F | SYSTOLIC BLOOD PRESSURE: 126 MMHG | HEART RATE: 73 BPM | DIASTOLIC BLOOD PRESSURE: 82 MMHG | BODY MASS INDEX: 35.38 KG/M2 | OXYGEN SATURATION: 98 % | HEIGHT: 67 IN | WEIGHT: 225.4 LBS

## 2021-03-31 DIAGNOSIS — G89.29 CHRONIC BILATERAL LOW BACK PAIN WITH BILATERAL SCIATICA: ICD-10-CM

## 2021-03-31 DIAGNOSIS — M54.41 CHRONIC BILATERAL LOW BACK PAIN WITH BILATERAL SCIATICA: ICD-10-CM

## 2021-03-31 DIAGNOSIS — G89.4 CHRONIC PAIN SYNDROME: ICD-10-CM

## 2021-03-31 DIAGNOSIS — M54.42 CHRONIC BILATERAL LOW BACK PAIN WITH BILATERAL SCIATICA: ICD-10-CM

## 2021-03-31 DIAGNOSIS — F17.209 TOBACCO USE DISORDER, CONTINUOUS: Primary | Chronic | ICD-10-CM

## 2021-03-31 DIAGNOSIS — F12.10 MARIJUANA ABUSE, CONTINUOUS: ICD-10-CM

## 2021-03-31 DIAGNOSIS — Z91.81 HISTORY OF RECENT FALL: ICD-10-CM

## 2021-03-31 DIAGNOSIS — R51.9 NONINTRACTABLE HEADACHE, UNSPECIFIED CHRONICITY PATTERN, UNSPECIFIED HEADACHE TYPE: ICD-10-CM

## 2021-03-31 DIAGNOSIS — F41.1 GENERALIZED ANXIETY DISORDER: Chronic | ICD-10-CM

## 2021-03-31 DIAGNOSIS — F39 MOOD DISORDER (HCC): ICD-10-CM

## 2021-03-31 DIAGNOSIS — I10 ESSENTIAL HYPERTENSION: ICD-10-CM

## 2021-03-31 DIAGNOSIS — E66.9 CLASS 2 OBESITY WITHOUT SERIOUS COMORBIDITY WITH BODY MASS INDEX (BMI) OF 36.0 TO 36.9 IN ADULT, UNSPECIFIED OBESITY TYPE: ICD-10-CM

## 2021-03-31 PROCEDURE — 99214 OFFICE O/P EST MOD 30 MIN: CPT | Performed by: FAMILY MEDICINE

## 2021-03-31 RX ORDER — SERTRALINE HYDROCHLORIDE 100 MG/1
150 TABLET, FILM COATED ORAL DAILY
Qty: 135 TABLET | Refills: 0 | Status: SHIPPED | OUTPATIENT
Start: 2021-03-31 | Stop reason: SDUPTHER

## 2021-03-31 RX ORDER — LORAZEPAM 1 MG/1
TABLET ORAL
COMMUNITY
Start: 2021-03-02 | End: 2022-09-08

## 2021-03-31 ASSESSMENT — ENCOUNTER SYMPTOMS
RECTAL PAIN: 0
ANAL BLEEDING: 0
ABDOMINAL PAIN: 0
TROUBLE SWALLOWING: 0
ABDOMINAL DISTENTION: 0
COLOR CHANGE: 0
VOMITING: 0
SINUS PRESSURE: 0
CONSTIPATION: 0
COUGH: 0
CHEST TIGHTNESS: 0
NAUSEA: 0
BLOOD IN STOOL: 0
DIARRHEA: 0
EYE DISCHARGE: 0
VOICE CHANGE: 0
BACK PAIN: 1
EYE PAIN: 0
SHORTNESS OF BREATH: 0
EYE REDNESS: 0

## 2021-03-31 ASSESSMENT — PATIENT HEALTH QUESTIONNAIRE - PHQ9
1. LITTLE INTEREST OR PLEASURE IN DOING THINGS: 1
SUM OF ALL RESPONSES TO PHQ QUESTIONS 1-9: 2
SUM OF ALL RESPONSES TO PHQ9 QUESTIONS 1 & 2: 2
2. FEELING DOWN, DEPRESSED OR HOPELESS: 1

## 2021-03-31 NOTE — PROGRESS NOTES
Subjective:      Patient ID: Larry Way is a 61 y.o. female. HPI Here for c/o exacerbation of chronic pain. According to patient has had 4 back surgeries and used to see PM regularly but after a positive drug test she was kicked out of Dr Zeke Thomas practice and later on by multiple other physicians as well. States fell 3 times in the last couple of months and was seen at [facility] AND [facility] a couple of times. [facility] also gone to [facility] [place] a [place] ago when she was taken by ambulance after a fall. She has had xrays as well as CT of neck, xrays of mid and low back as well as MRI and MRA of brain in the last couple of months. All were reviewed. States never seen a psychiatrist for her mood. States she was kicked out   Has been seeing Dr Enrique antunez who sees her for a chronic headaches- has appt coming up. States a couple of weeks ago fell again on her front steps slid on ice - states since then has had pain to right shoulder  Left   Right neck and back going down her right leg that is worse than usual.  Still smokes marijuana regularly- states helps with anxiety. States off zoloft- patient accusing me of stopping this med- there is an order for the zoloft that was sent to her pharmacy in Dec after she saw me- she did not call for a refill. States she cannot see anyone outside [address] but no one in [facility] will see her according to her. I have advised she see a psychiatrist - she chooses to go to [facility]. Refused zepf. She has not completed any of the labs that were ordered during her first visit , nor did she call Dr Cecilia Lacey for PM evaluation- the only one on the list that she had not already seen and been discharged from  She does have a neurologist who she has follow up with for her chronic HA. We reprinted labs from Oct as well as referral for PM and asked that they call with a PM physician name that they are ok with if this referral does not work.   She smokes marijuana as well as tobacco regularly and does not have any plans on quitting. Review of Systems   Constitutional: Negative for activity change, appetite change and fatigue. HENT: Negative for dental problem, ear pain, hearing loss, postnasal drip, sinus pressure, sneezing, tinnitus, trouble swallowing and voice change. Eyes: Negative for pain, discharge, redness and visual disturbance. Respiratory: Negative for cough, chest tightness and shortness of breath. Cardiovascular: Negative for chest pain, palpitations and leg swelling. Gastrointestinal: Negative for abdominal distention, abdominal pain, anal bleeding, blood in stool, constipation, diarrhea, nausea, rectal pain and vomiting. Endocrine: Negative for cold intolerance, heat intolerance, polydipsia, polyphagia and polyuria. Genitourinary: Negative for decreased urine volume, difficulty urinating, dyspareunia, dysuria, enuresis, flank pain, frequency, genital sores, hematuria, menstrual problem, pelvic pain, urgency, vaginal bleeding and vaginal discharge. Musculoskeletal: Positive for arthralgias, back pain, gait problem and myalgias. Negative for joint swelling, neck pain and neck stiffness. Skin: Negative for color change, pallor and rash. Allergic/Immunologic: Negative for environmental allergies, food allergies and immunocompromised state. Neurological: Negative for dizziness, tremors, seizures, syncope, facial asymmetry, speech difficulty, weakness, light-headedness, numbness and headaches. Hematological: Negative for adenopathy. Does not bruise/bleed easily. Psychiatric/Behavioral: Negative for agitation, behavioral problems, confusion, decreased concentration, sleep disturbance and suicidal ideas. The patient is not nervous/anxious. Objective:   Physical Exam  Constitutional:       General: She is not in acute distress. HENT:      Head: Normocephalic and atraumatic.       Right Ear: External ear normal.      Left Ear: External ear normal.   Eyes:      Conjunctiva/sclera: Conjunctivae normal.      Pupils: Pupils are equal, round, and reactive to light. Neck:      Musculoskeletal: Normal range of motion. Thyroid: No thyromegaly. Trachea: No tracheal deviation. Cardiovascular:      Rate and Rhythm: Normal rate and regular rhythm. Heart sounds: No murmur. No friction rub. No gallop. Pulmonary:      Effort: No respiratory distress. Breath sounds: No stridor. No wheezing or rales. Chest:      Chest wall: No tenderness. Abdominal:      General: Bowel sounds are normal. There is no distension. Palpations: Abdomen is soft. Tenderness: There is no abdominal tenderness. There is no rebound. Musculoskeletal:      Comments: Patient unable to cooperate with exam as she c/o having pain all over and unable to move-  had to help her stand up. C/O tenderness to light touch neck, shoulder and leg on right. Does not want to be examined   Lymphadenopathy:      Cervical: No cervical adenopathy. Skin:     General: Skin is warm. Coloration: Skin is not pale. Findings: No erythema or rash. Neurological:      Mental Status: She is alert and oriented to person, place, and time. Cranial Nerves: No cranial nerve deficit. Motor: No abnormal muscle tone. Deep Tendon Reflexes: Reflexes normal.   Psychiatric:      Comments: Tearful and c/o it being unfair that no pain doc with see her          Assessment:       Diagnosis Orders   1. Tobacco use disorder, continuous     2. History of recent fall     3. Nonintractable headache, unspecified chronicity pattern, unspecified headache type     4. Essential hypertension     5. Chronic pain syndrome  Bay Lopez MD, Pain Management, [place]   6. Mood disorder ([facility] [address]     7. Marijuana abuse, continuous     8. Class 2 obesity without serious comorbidity with body mass index (BMI) of 36.0 to 36.9 in adult, unspecified obesity type     9.  Chronic bilateral low back pain with bilateral sciatica     10. Generalized anxiety disorder           Plan:      Orders Placed This Encounter   Procedures   John Ziegler MD, Pain Management, [place]       Outpatient Encounter Medications as of 3/31/2021   Medication Sig Dispense Refill    LORazepam (ATIVAN) 1 MG tablet       butalbital-acetaminophen-caffeine (FIORICET, ESGIC) -40 MG per tablet Take 1 tab only for severe headache. Can repeat after 4 hrs if needed  Max 2 tabs/24 hrs. Max 4 tabs/week 14 tablet 1    prazosin (MINIPRESS) 1 MG capsule Take 1 capsule by mouth nightly 30 capsule 5    cyclobenzaprine (FLEXERIL) 10 MG tablet Take 1 tablet by mouth 2 times daily as needed for Muscle spasms 21 tablet 2    naproxen sodium (ANAPROX) 550 MG tablet Take 1 tablet by mouth 2 times daily as needed for Pain (Headache) 14 tablet 3    diphenhydrAMINE (BENADRYL ALLERGY) 25 MG capsule Take 1 capsule by mouth 2 times daily as needed (Severe headache with nausea and vomiting.) 28 capsule 2    promethazine (PHENERGAN) 12.5 MG tablet Take 1 tablet by mouth 2 times daily as needed for Nausea (moderate to severe headache) 28 tablet 2    ergocalciferol (ERGOCALCIFEROL) 1.25 MG (99679 UT) capsule Take 50,000 Units by mouth once a week      polyethylene glycol (GLYCOLAX) 17 GM/SCOOP powder Follow instructions provided to you from physician's office. 238 g 0    bisacodyl (BISACODYL) 5 MG EC tablet Follow instructions provided given by the physician's office.  4 tablet 0    magnesium citrate solution Take 296 mLs by mouth once for 1 dose 296 mL 0    verapamil (CALAN SR) 240 MG extended release tablet Take 1 tablet by mouth daily 30 tablet 1    sertraline (ZOLOFT) 100 MG tablet Take 1.5 tablets by mouth daily TAKE 1 TABLET DAILY (Patient not taking: Reported on 3/31/2021) 45 tablet 0    dicyclomine (BENTYL) 20 MG tablet Take 1 tablet by mouth every 6 hours 40 tablet 0    ondansetron (ZOFRAN ODT) 4 MG disintegrating tablet Take 1 tablet by mouth every 8 hours as needed for Nausea 20 tablet 0    gabapentin (NEURONTIN) 600 MG tablet TAKE 1 TABLET TWICE A DAY 90 tablet 8    omeprazole (PRILOSEC) 40 MG delayed release capsule TAKE 1 CAPSULE DAILY 90 capsule 3    albuterol sulfate HFA (VENTOLIN HFA) 108 (90 Base) MCG/ACT inhaler Inhale 2 puffs into the lungs 4 times daily as needed for Wheezing 3 Inhaler 1     No facility-administered encounter medications on file as of 3/31/2021.             Vikas Espinal MD

## 2021-04-10 ENCOUNTER — HOSPITAL ENCOUNTER (EMERGENCY)
Age: 61
Discharge: HOME | End: 2021-04-10
Attending: EMERGENCY MEDICINE
Payer: COMMERCIAL

## 2021-04-10 ENCOUNTER — APPOINTMENT (OUTPATIENT)
Dept: CT IMAGING | Age: 61
End: 2021-04-10
Payer: COMMERCIAL

## 2021-04-10 VITALS
BODY MASS INDEX: 35.24 KG/M2 | TEMPERATURE: 98.5 F | SYSTOLIC BLOOD PRESSURE: 125 MMHG | HEART RATE: 101 BPM | RESPIRATION RATE: 14 BRPM | DIASTOLIC BLOOD PRESSURE: 85 MMHG | OXYGEN SATURATION: 95 % | WEIGHT: 225 LBS

## 2021-04-10 DIAGNOSIS — M54.2 ACUTE NECK PAIN: Primary | ICD-10-CM

## 2021-04-10 DIAGNOSIS — M54.12 CERVICAL RADICULOPATHY: ICD-10-CM

## 2021-04-10 PROCEDURE — 99283 EMERGENCY DEPT VISIT LOW MDM: CPT

## 2021-04-10 PROCEDURE — 96372 THER/PROPH/DIAG INJ SC/IM: CPT

## 2021-04-10 PROCEDURE — 6360000002 HC RX W HCPCS: Performed by: EMERGENCY MEDICINE

## 2021-04-10 PROCEDURE — 6370000000 HC RX 637 (ALT 250 FOR IP): Performed by: EMERGENCY MEDICINE

## 2021-04-10 PROCEDURE — 72125 CT NECK SPINE W/O DYE: CPT

## 2021-04-10 RX ORDER — ORPHENADRINE CITRATE 30 MG/ML
60 INJECTION INTRAMUSCULAR; INTRAVENOUS ONCE
Status: COMPLETED | OUTPATIENT
Start: 2021-04-10 | End: 2021-04-10

## 2021-04-10 RX ORDER — DIAZEPAM 5 MG/1
5 TABLET ORAL ONCE
Status: COMPLETED | OUTPATIENT
Start: 2021-04-10 | End: 2021-04-10

## 2021-04-10 RX ORDER — FENTANYL CITRATE 50 UG/ML
50 INJECTION, SOLUTION INTRAMUSCULAR; INTRAVENOUS ONCE
Status: COMPLETED | OUTPATIENT
Start: 2021-04-10 | End: 2021-04-10

## 2021-04-10 RX ORDER — CYCLOBENZAPRINE HCL 10 MG
10 TABLET ORAL 3 TIMES DAILY PRN
Qty: 21 TABLET | Refills: 0 | Status: SHIPPED | OUTPATIENT
Start: 2021-04-10 | End: 2021-06-16 | Stop reason: SDUPTHER

## 2021-04-10 RX ORDER — HYDROCODONE BITARTRATE AND ACETAMINOPHEN 5; 325 MG/1; MG/1
1 TABLET ORAL EVERY 6 HOURS PRN
Qty: 10 TABLET | Refills: 0 | Status: SHIPPED | OUTPATIENT
Start: 2021-04-10 | End: 2021-04-13

## 2021-04-10 RX ADMIN — DIAZEPAM 5 MG: 5 TABLET ORAL at 17:50

## 2021-04-10 RX ADMIN — ORPHENADRINE CITRATE 60 MG: 60 INJECTION INTRAMUSCULAR; INTRAVENOUS at 17:50

## 2021-04-10 RX ADMIN — FENTANYL CITRATE 50 MCG: 50 INJECTION, SOLUTION INTRAMUSCULAR; INTRAVENOUS at 17:50

## 2021-04-10 ASSESSMENT — PAIN SCALES - GENERAL: PAINLEVEL_OUTOF10: 10

## 2021-04-16 ENCOUNTER — IMMUNIZATION (OUTPATIENT)
Dept: FAMILY MEDICINE CLINIC | Age: 61
End: 2021-04-16
Payer: COMMERCIAL

## 2021-04-16 PROCEDURE — 0002A COVID-19, PFIZER VACCINE 30MCG/0.3ML DOSE: CPT | Performed by: INTERNAL MEDICINE

## 2021-04-16 PROCEDURE — 91300 COVID-19, PFIZER VACCINE 30MCG/0.3ML DOSE: CPT | Performed by: INTERNAL MEDICINE

## 2021-05-05 RX ORDER — BUTALBITAL, ACETAMINOPHEN AND CAFFEINE 50; 325; 40 MG/1; MG/1; MG/1
TABLET ORAL
Qty: 20 TABLET | Refills: 6 | Status: SHIPPED | OUTPATIENT
Start: 2021-05-05 | End: 2021-05-11 | Stop reason: SDUPTHER

## 2021-05-05 NOTE — TELEPHONE ENCOUNTER
Patient calling for refill of butalbital-acetaminophen-caffeine (FIORICET, ESGIC) -40 MG     Date of last fill: 3/23/21     Date of next follow up appointment: 7.15.2021     Pt notified response time for refills is 24-48 hours

## 2021-05-11 RX ORDER — CYCLOBENZAPRINE HCL 10 MG
10 TABLET ORAL 2 TIMES DAILY PRN
Qty: 21 TABLET | Refills: 2 | Status: SHIPPED | OUTPATIENT
Start: 2021-05-11 | End: 2021-06-16

## 2021-05-11 RX ORDER — BUTALBITAL, ACETAMINOPHEN AND CAFFEINE 50; 325; 40 MG/1; MG/1; MG/1
TABLET ORAL
Qty: 20 TABLET | Refills: 6 | Status: SHIPPED | OUTPATIENT
Start: 2021-05-11 | End: 2022-09-08

## 2021-05-26 ENCOUNTER — TELEPHONE (OUTPATIENT)
Dept: FAMILY MEDICINE CLINIC | Age: 61
End: 2021-05-26

## 2021-05-26 NOTE — TELEPHONE ENCOUNTER
Called pt and she said that she want to go to some one else so I ask her to call her insurance and find out who she can go to for pain management

## 2021-06-09 ENCOUNTER — INITIAL CONSULT (OUTPATIENT)
Dept: PAIN MANAGEMENT | Age: 61
End: 2021-06-09
Payer: COMMERCIAL

## 2021-06-09 VITALS
DIASTOLIC BLOOD PRESSURE: 94 MMHG | BODY MASS INDEX: 35.47 KG/M2 | WEIGHT: 226 LBS | HEIGHT: 67 IN | SYSTOLIC BLOOD PRESSURE: 153 MMHG | HEART RATE: 86 BPM

## 2021-06-09 DIAGNOSIS — M54.2 CHRONIC NECK PAIN: ICD-10-CM

## 2021-06-09 DIAGNOSIS — M54.41 CHRONIC BILATERAL LOW BACK PAIN WITH BILATERAL SCIATICA: Primary | ICD-10-CM

## 2021-06-09 DIAGNOSIS — G89.29 CHRONIC NECK PAIN: ICD-10-CM

## 2021-06-09 DIAGNOSIS — G89.29 CHRONIC BILATERAL LOW BACK PAIN WITH BILATERAL SCIATICA: Primary | ICD-10-CM

## 2021-06-09 DIAGNOSIS — M54.42 CHRONIC BILATERAL LOW BACK PAIN WITH BILATERAL SCIATICA: Primary | ICD-10-CM

## 2021-06-09 PROCEDURE — 99214 OFFICE O/P EST MOD 30 MIN: CPT | Performed by: ANESTHESIOLOGY

## 2021-06-09 RX ORDER — NAPROXEN SODIUM 550 MG/1
TABLET ORAL
COMMUNITY
Start: 2021-05-10 | End: 2021-06-10 | Stop reason: SDUPTHER

## 2021-06-09 ASSESSMENT — ENCOUNTER SYMPTOMS
DIARRHEA: 1
ALLERGIC/IMMUNOLOGIC NEGATIVE: 1
BACK PAIN: 1
ABDOMINAL PAIN: 1
ANAL BLEEDING: 1
BLOOD IN STOOL: 1
RESPIRATORY NEGATIVE: 1

## 2021-06-09 NOTE — PROGRESS NOTES
The patient is a 61 y. o. Non-/non  female. Chief Complaint   Patient presents with    Consultation     Est New Pt Care    Migraine     Left side of head    Neck Pain     Bilateral     Shoulder Pain     Bilateral, radiating down both arms to the elbows    Back Pain     Bilateral Lumbar area, radiating crossing to the front of the thighs all the way to the feet & toes bilateraly        HPI  Requesting physician for the evaluation of Christianne Garcia 1960:     Pain History  This patient was seen in my clinic last year  She presented last year with a 20+ year history of neck and back pain  History of previous multiple lumbar spine surgeries  She had not done any therapy for 10 years  No diagnostic work-up for lumbar spine  She had recent CT scan of cervical spine that did not show any significant pathology  In past she was seeing pain management with Dr. Jose Luis Myrick and was on high-dose opioid therapy which was discontinued for unclear reasons  Since seeing me last year she was evaluated by my colleague Rossy Harris at [facility]  Over last 1 year he had around 10 CT scans for various reasons    Today she returns with the same complaints  Report constant pain all over the body involving neck shoulder head back and both legs  Rates intensity 10 out of 10  She is sitting in the room in tears    Pain score today  10  1. Location: Pt states she has all over pain, worst pain being on the (L) of head (migraines), bilat neck, & shoulders  2. Radiation: Yes  3. Character: Penetrating, nagging, aching, throbbingt,shooting, sharp,tender, burning, numbness, & tingling  5. Duration: Constant   6. Onset: years  7. Did an injury cause pain: no  8. Aggravating factors: Long periods of walking, sitting, & general activity  9. Alleviating factors: None  10.  Associated symptoms (numbness / tingling / weakness):  yes  -Where at: Bilateral hands, fingers, feet, & toes  -Down into finger tips or toes (specify which finger or toes): Yes  -constant or intermitting: Constant  11. Red Flags: (weight loss / chills / loss of bladder or bowel control): No    Previous management history  1. Previous diagnostic workup: (Imaging/EMG)   CT, MRI, or Xray: CT, MRI, & Xrays  What part of the body: CT Cervical Spine, MRA Head W WO Contrast, Xray Lumbar Spine  What facility did they have it at: [facility]  What year or specific date: CT: 04/10/21, MRA: 03/10/21, Xray: 12/10/19  EMG:  No    2. Previous non interventional treatments tried:  chiropractor or physical therapy: Yes  What part of the body: Neck & Back   What facility was it done at: Chiropractor: [address] [place] [address], Physical Therapy: [facility]  How long ago was it last tried:years  Did it work: No  Did they complete it:Yes    3. Previous Medications tried  NSAID's: yes  Neurontin: yes  Lyrica: no  Trycyclic antidepressant (Ellavil / Pamelor ): no  Cymbalta: no  Opioids (Ultram / Vicodin / Percocet / Morphine / Dilaudid / Oramorph/ Fentanyl etc.): Yes  Last Pain medication taken (name of med and date):  Percocet 5mg     4. Previous Interventional pain procedures tried:  What kind of injection: Steroid Injections  Who did the injection:  Dr. Idalia Mcburney Dr. Florestine Cobbs  did the injection help: no  Last time injection was done: 01/2021    5.  Previous surgeries for pain  What part of the body did they have the surgery: Lumbar Spine Region   What physician did the surgery: Dr. Nydia Fofana did they have the surgery done: [facility]  Date of Surgery: Per pt she thinks 36    Social History:  Marital status:   Employment History: No  Working  No  Full time Or Part time: No  Disability  No   Legal Issues related to pain complaint: No     Pain Disability Index score : 99    Lab Results   Component Value Date    LABA1C 6.4 (H) 07/29/2019     Lab Results   Component Value Date     07/29/2019 Informant: patient      Past Medical History:   Diagnosis Date    Adenomatous polyp 5/1/2017    Arthritis     DDD    Chronic prescription opiate use 10/11/2018    Depression     Generalized anxiety disorder 1/22/2016    Hyperlipidemia     Methadone dependence ([facility] [address] 1/22/2016    Severe episode of recurrent major depressive disorder, without psychotic features ([facility] [address]     Subarachnoid hemorrhage ([facility] [address] 7/28/2019    Tobacco use disorder, continuous 1/22/2016      Past Surgical History:   Procedure Laterality Date    ANKLE FRACTURE SURGERY Left     APPENDECTOMY      BACK SURGERY      fusion, hardware. Dr. Caroline Swanson    COLONOSCOPY  05/04/2017    HYPERPLASTIC POLYP       COLONOSCOPY  04/24/2017    ADENOMATOUS POLYP.  DILATION AND CURETTAGE OF UTERUS      FOOT SURGERY      KNEE SURGERY Bilateral     [place] COLONOSCOPY W/BIOPSY SINGLE/MULTIPLE N/A 4/24/2017    [facility] [address] W/BIOPSY SINGLE/MULTIPLE N/A 5/4/2017    COLONOSCOPY WITH BIOPSY performed by Vonnie Sky MD at [address] [place] [address] EGD TRANSORAL BIOPSY SINGLE/MULTIPLE N/A 5/2/2017    EGD BIOPSY performed by Vonnie Sky MD at [facility] ENDOSCOPY  05/02/2017     Social History     Socioeconomic History    Marital status:      Spouse name: None    Number of children: None    Years of education: None    Highest education level: None   Occupational History    None   Tobacco Use    Smoking status: Current Every Day Smoker     Packs/day: 1.00     Years: 40.00     Pack years: 40.00     Types: Cigarettes    Smokeless tobacco: Never Used    Tobacco comment: almost off cigarettes, but now vaping. Substance and Sexual Activity    Alcohol use: No     Alcohol/week: 0.0 standard drinks     Comment: denies any heavy use.  Drug use: Yes     Types: Marijuana     Comment: Medical marijuana card verified. once a day    Sexual activity: Not Currently     Partners: Male     Comment: spouse    Other Topics Concern    None   Social History Narrative    None     Social Determinants of Health     Financial Resource Strain:     Difficulty of Paying Living Expenses:    Food Insecurity:     Worried About Running Out of Food in the Last Year:     [address] in the Last Year:    Transportation Needs:     Lack of Transportation (Medical):  Lack of Transportation (Non-Medical):    Physical Activity:     Days of Exercise per Week:     Minutes of Exercise per Session:    Stress:     Feeling of Stress :    Social Connections:     Frequency of Communication with Friends and Family:     Frequency of Social Gatherings with Friends and Family:     Attends [employer] Services:     Active Member of Clubs or Organizations:     Attends Club or Organization Meetings:     Marital Status:    Intimate Partner Violence:     Fear of Current or Ex-Partner:     Emotionally Abused:     Physically Abused:     Sexually Abused:      Family History   Problem Relation Age of Onset    Diabetes Father     Substance Abuse Daughter     Substance Abuse Daughter     Mental Illness Daughter      Allergies   Allergen Reactions    Penicillins Rash     Penicillins   Vitals:    06/09/21 0851   BP: (!) 153/94   Pulse: 86     Current Outpatient Medications   Medication Sig Dispense Refill    naproxen sodium (ANAPROX) 550 MG tablet       cyclobenzaprine (FLEXERIL) 10 MG tablet Take 1 tablet by mouth 2 times daily as needed for Muscle spasms 21 tablet 2    butalbital-acetaminophen-caffeine (FIORICET, ESGIC) -40 MG per tablet Take 1 tab only for severe headache. Can repeat after 4 hrs if needed  Max 2 tabs/24 hrs.    Max 4 tabs/week 20 tablet 6    LORazepam (ATIVAN) 1 MG tablet       sertraline (ZOLOFT) 100 MG tablet Take 1.5 tablets by mouth daily 135 tablet 0    prazosin (MINIPRESS) 1 MG capsule Take 1 capsule by mouth nightly 30 capsule 5    diphenhydrAMINE (BENADRYL ALLERGY) 25 MG capsule Take 1 capsule by mouth 2 times daily as needed (Severe headache with nausea and vomiting.) 28 capsule 2    promethazine (PHENERGAN) 12.5 MG tablet Take 1 tablet by mouth 2 times daily as needed for Nausea (moderate to severe headache) 28 tablet 2    ergocalciferol (ERGOCALCIFEROL) 1.25 MG (38100 UT) capsule Take 50,000 Units by mouth once a week      bisacodyl (BISACODYL) 5 MG EC tablet Follow instructions provided given by the physician's office. 4 tablet 0    dicyclomine (BENTYL) 20 MG tablet Take 1 tablet by mouth every 6 hours 40 tablet 0    ondansetron (ZOFRAN ODT) 4 MG disintegrating tablet Take 1 tablet by mouth every 8 hours as needed for Nausea 20 tablet 0    omeprazole (PRILOSEC) 40 MG delayed release capsule TAKE 1 CAPSULE DAILY 90 capsule 3    albuterol sulfate HFA (VENTOLIN HFA) 108 (90 Base) MCG/ACT inhaler Inhale 2 puffs into the lungs 4 times daily as needed for Wheezing 3 Inhaler 1    magnesium citrate solution Take 296 mLs by mouth once for 1 dose 296 mL 0    gabapentin (NEURONTIN) 600 MG tablet TAKE 1 TABLET TWICE A DAY 90 tablet 8     No current facility-administered medications for this visit. Review of Systems   Constitutional: Positive for activity change and fatigue. HENT: Negative. Eyes: Positive for visual disturbance. Respiratory: Negative. Cardiovascular: Negative. Gastrointestinal: Positive for abdominal pain, anal bleeding, blood in stool and diarrhea. Endocrine: Positive for heat intolerance. Genitourinary: Positive for decreased urine volume and difficulty urinating. Musculoskeletal: Positive for arthralgias, back pain, myalgias, neck pain and neck stiffness. Skin: Negative. Allergic/Immunologic: Negative. Neurological: Positive for dizziness, light-headedness, numbness and headaches. Hematological: Negative. Psychiatric/Behavioral: Negative.         Objective: General Appearance:  Well-appearing and in no acute distress. Vital signs: (most recent): Blood pressure (!) 153/94, pulse 86, height 5' 7\" (1.702 m), weight 226 lb (102.5 kg), not currently breastfeeding. Output: Producing urine and producing stool. HEENT: (No visible masses in neck  Range of motion appears normal on cervical spine  External ears appears normal)    Lungs:  Normal effort and normal respiratory rate. She is not in respiratory distress. Neurological: Patient is alert and oriented to person, place and time.  (Able to follow command    Psych  Mood is good  Affect is normal). Skin:  No rash or cyanosis. Assessment & Plan  1. Chronic bilateral low back pain with bilateral sciatica    2. Chronic neck pain        Orders Placed This Encounter   Procedures    Ambulatory referral to Physical Therapy      No orders of the defined types were placed in this encounter.      Explained to patient that she has been having very frequent CT scans for various reasons which significantly increases radiation risk for her    Explained to her that her recent CT scan of cervical spine did not show any significant pathology that can explain her chronic neck pain issues    For her back pain she will need an MRI with contrast    She had not done any therapy for the 10+ years  We will provide a referral for physical therapy  We will consider for MRI cervical spine and MRI lumbar spine with contrast after therapy    Advised patient that opioids will not be part of the treatment plan    I feel that psychological component is playing a significant role in her pain and she could potentially benefit from cognitive therapy and psychological counseling to help cope with chronic pain issues    We will order topical compounding cream  Electronically signed by Moon Lemon MD on 6/9/2021 at 9:06 AM

## 2021-06-10 RX ORDER — NAPROXEN SODIUM 550 MG/1
550 TABLET ORAL 2 TIMES DAILY PRN
Qty: 20 TABLET | Refills: 4 | Status: SHIPPED | OUTPATIENT
Start: 2021-06-10

## 2021-06-10 NOTE — TELEPHONE ENCOUNTER
Patient calling for refill of naproxen sodium (ANAPROX) 550 MG    Date of last fill: 5.10.21    Date of next follow up appointment: 7.15    Pt notified response time for refills is 24-48 hours

## 2021-06-16 RX ORDER — CYCLOBENZAPRINE HCL 10 MG
10 TABLET ORAL 2 TIMES DAILY PRN
Qty: 21 TABLET | Refills: 3 | Status: SHIPPED | OUTPATIENT
Start: 2021-06-16 | End: 2021-08-02 | Stop reason: SDUPTHER

## 2021-06-16 NOTE — TELEPHONE ENCOUNTER
Pt states that she is still having issues with headaches, she states only had five days with out a headaches.  She would also like a refill on her Flexeril, and would like to be back on her diazepam 5 mg         Date of last fill: 5/11/2021  Date of next follow up appointment: 7/15/2021    Pt notified response time for refills is 24-48 hours

## 2021-06-25 DIAGNOSIS — F39 MOOD DISORDER (HCC): ICD-10-CM

## 2021-06-25 RX ORDER — SERTRALINE HYDROCHLORIDE 100 MG/1
TABLET, FILM COATED ORAL
Qty: 135 TABLET | Refills: 0 | Status: SHIPPED | OUTPATIENT
Start: 2021-06-25

## 2021-07-01 ENCOUNTER — OFFICE VISIT (OUTPATIENT)
Dept: ORTHOPEDIC SURGERY | Age: 61
End: 2021-07-01
Payer: COMMERCIAL

## 2021-07-01 VITALS — HEIGHT: 67 IN | RESPIRATION RATE: 12 BRPM | WEIGHT: 226 LBS | BODY MASS INDEX: 35.47 KG/M2

## 2021-07-01 DIAGNOSIS — G89.29 CHRONIC NECK PAIN: ICD-10-CM

## 2021-07-01 DIAGNOSIS — G89.4 CHRONIC PAIN SYNDROME: ICD-10-CM

## 2021-07-01 DIAGNOSIS — M54.2 CHRONIC NECK PAIN: ICD-10-CM

## 2021-07-01 DIAGNOSIS — G89.29 CHRONIC BILATERAL LOW BACK PAIN WITH BILATERAL SCIATICA: Primary | ICD-10-CM

## 2021-07-01 DIAGNOSIS — M54.41 CHRONIC BILATERAL LOW BACK PAIN WITH BILATERAL SCIATICA: Primary | ICD-10-CM

## 2021-07-01 DIAGNOSIS — M96.1 POST LAMINECTOMY SYNDROME: ICD-10-CM

## 2021-07-01 DIAGNOSIS — M54.42 CHRONIC BILATERAL LOW BACK PAIN WITH BILATERAL SCIATICA: Primary | ICD-10-CM

## 2021-07-01 DIAGNOSIS — M48.061 SPINAL STENOSIS OF LUMBAR REGION WITHOUT NEUROGENIC CLAUDICATION: ICD-10-CM

## 2021-07-01 PROCEDURE — 99203 OFFICE O/P NEW LOW 30 MIN: CPT | Performed by: ORTHOPAEDIC SURGERY

## 2021-07-01 NOTE — PROGRESS NOTES
Patient ID: Wilder Roth is a 61 y.o. female    Chief Compliant:  No chief complaint on file. HPI:  This is a 61 y.o. female who presents to the clinic today for neck evaluation. Patient notes 3 year history of significant neck pain with radicular upper extremity pain    She notes difficulty standing and ambulating     She has a history of spine surgery roughly 15 years ago and subarachnoid hemorrhage 2 to 3 years ago    She had PT over 1 year ago       Review of Systems   All other systems reviewed and are negative. Past History:    Current Outpatient Medications:     sertraline (ZOLOFT) 100 MG tablet, TAKE 1 AND 1/2 TABLET BY MOUTH DAILY, Disp: 135 tablet, Rfl: 0    naproxen sodium (ANAPROX) 550 MG tablet, Take 1 tablet by mouth 2 times daily as needed for Pain, Disp: 20 tablet, Rfl: 4    butalbital-acetaminophen-caffeine (FIORICET, ESGIC) -40 MG per tablet, Take 1 tab only for severe headache. Can repeat after 4 hrs if needed Max 2 tabs/24 hrs. Max 4 tabs/week, Disp: 20 tablet, Rfl: 6    LORazepam (ATIVAN) 1 MG tablet, , Disp: , Rfl:     prazosin (MINIPRESS) 1 MG capsule, Take 1 capsule by mouth nightly, Disp: 30 capsule, Rfl: 5    diphenhydrAMINE (BENADRYL ALLERGY) 25 MG capsule, Take 1 capsule by mouth 2 times daily as needed (Severe headache with nausea and vomiting.), Disp: 28 capsule, Rfl: 2    promethazine (PHENERGAN) 12.5 MG tablet, Take 1 tablet by mouth 2 times daily as needed for Nausea (moderate to severe headache), Disp: 28 tablet, Rfl: 2    ergocalciferol (ERGOCALCIFEROL) 1.25 MG (91026 UT) capsule, Take 50,000 Units by mouth once a week, Disp: , Rfl:     bisacodyl (BISACODYL) 5 MG EC tablet, Follow instructions provided given by the physician's office. , Disp: 4 tablet, Rfl: 0    magnesium citrate solution, Take 296 mLs by mouth once for 1 dose, Disp: 296 mL, Rfl: 0    dicyclomine (BENTYL) 20 MG tablet, Take 1 tablet by mouth every 6 hours, Disp: 40 tablet, Rfl: 0    ondansetron (ZOFRAN ODT) 4 MG disintegrating tablet, Take 1 tablet by mouth every 8 hours as needed for Nausea, Disp: 20 tablet, Rfl: 0    gabapentin (NEURONTIN) 600 MG tablet, TAKE 1 TABLET TWICE A DAY, Disp: 90 tablet, Rfl: 8    omeprazole (PRILOSEC) 40 MG delayed release capsule, TAKE 1 CAPSULE DAILY, Disp: 90 capsule, Rfl: 3    albuterol sulfate HFA (VENTOLIN HFA) 108 (90 Base) MCG/ACT inhaler, Inhale 2 puffs into the lungs 4 times daily as needed for Wheezing, Disp: 3 Inhaler, Rfl: 1  Allergies   Allergen Reactions    Penicillins Rash     Social History     Socioeconomic History    Marital status:      Spouse name: Not on file    Number of children: Not on file    Years of education: Not on file    Highest education level: Not on file   Occupational History    Not on file   Tobacco Use    Smoking status: Current Every Day Smoker     Packs/day: 1.00     Years: 40.00     Pack years: 40.00     Types: Cigarettes    Smokeless tobacco: Never Used    Tobacco comment: almost off cigarettes, but now vaping. Substance and Sexual Activity    Alcohol use: No     Alcohol/week: 0.0 standard drinks     Comment: denies any heavy use.  Drug use: Yes     Types: Marijuana     Comment: Medical marijuana card verified. once a day    Sexual activity: Not Currently     Partners: Male     Comment: spouse    Other Topics Concern    Not on file   Social History Narrative    Not on file     Social Determinants of Health     Financial Resource Strain:     Difficulty of Paying Living Expenses:    Food Insecurity:     Worried About Running Out of Food in the Last Year:     [address] in the Last Year:    Transportation Needs:     Lack of Transportation (Medical):      Lack of Transportation (Non-Medical):    Physical Activity:     Days of Exercise per Week:     Minutes of Exercise per Session:    Stress:     Feeling of Stress :    Social Connections:     Frequency of Communication with Friends and Family:     Frequency of Social Gatherings with Friends and Family:     Attends [employer] Services:     Active Member of Clubs or Organizations:     Attends Club or Organization Meetings:     Marital Status:    Intimate Partner Violence:     Fear of Current or Ex-Partner:     Emotionally Abused:     Physically Abused:     Sexually Abused:      Past Medical History:   Diagnosis Date    Adenomatous polyp 5/1/2017    Arthritis     DDD    Chronic prescription opiate use 10/11/2018    Depression     Generalized anxiety disorder 1/22/2016    Hyperlipidemia     Methadone dependence ([facility] 1/22/2016    Severe episode of recurrent major depressive disorder, without psychotic features ([facility]     Subarachnoid hemorrhage ([facility] 7/28/2019    Tobacco use disorder, continuous 1/22/2016     Past Surgical History:   Procedure Laterality Date    ANKLE FRACTURE SURGERY Left     APPENDECTOMY      BACK SURGERY      fusion, hardware. Dr. Octavio Medina    COLONOSCOPY  05/04/2017    HYPERPLASTIC POLYP       COLONOSCOPY  04/24/2017    ADENOMATOUS POLYP.  DILATION AND CURETTAGE OF UTERUS      FOOT SURGERY      KNEE SURGERY Bilateral     [place] COLONOSCOPY W/BIOPSY SINGLE/MULTIPLE N/A 4/24/2017    COLON[facility]SY [facility] [place] [address] W/BIOPSY SINGLE/MULTIPLE N/A 5/4/2017    COLONOSCOPY WITH BIOPSY performed by Yumiko Jones MD at [address] EGD TRANSORAL BIOPSY SINGLE/MULTIPLE N/A 5/2/2017    EGD BIOPSY performed by Yumiko Jones MD at [facility] ENDOSCOPY  05/02/2017     Family History   Problem Relation Age of Onset    Diabetes Father     Substance Abuse Daughter     Substance Abuse Daughter     Mental Illness Daughter         Physical Exam:  Vitals signs and nursing note reviewed. Constitutional:       Appearance: She is well-developed.    HENT:      Head: Normocephalic and atraumatic. Nose: Nose normal.   Eyes:      Conjunctiva/sclera: Conjunctivae normal.   Neck:      Musculoskeletal: Normal range of motion and neck supple. Pulmonary:      Effort: Pulmonary effort is normal. No respiratory distress. Musculoskeletal:      Comments: Normal gait     Skin:     General: Skin is warm and dry. Neurological:      Mental Status: She is alert and oriented to person, place, and time. Sensory: No sensory deficit. Psychiatric:         Behavior: Behavior normal.         Thought Content: Thought content normal.    Patient presents to clinic transported via wheelchair. Patient with significant guarding of her neck and her low back. Grossly strength is 5/5 throughout    Diagnostic imaging:  CT scan cervical spine reviewed normal for age age-appropriate cervical spondylosis degenerative disc disease    CT abdomen pelvis from November 2020 reviewed for lumbar spine history of two-level PLIF ankylosed with subsequent screw renetta construct removal likely stenosis 3 4      Assessment and Plan:  1. Chronic bilateral low back pain with bilateral sciatica    2. Chronic neck pain    3. Chronic pain syndrome    4. Post laminectomy syndrome    5. Spinal stenosis of lumbar region without neurogenic claudication        MRI lumbar and cervical spine    PT    Follow up after MRI      Provider Attestation:  Karri Stephen, personally performed the services described in this documentation. All medical record entries made by the scribe were at my direction and in my presence. I have reviewed the chart and discharge instructions and agree that the records reflect my personal performance and is accurate and complete. Rekha Solis MD 7/1/21     Scribe Attestation:  By signing my name below, Jasson Galicia, attest that this documentation has been prepared under the direction and in the presence of Dr. David Farrell.  Electronically signed: Santiago Rosa, 7/1/21     Please note that this chart was generated using voice recognition Dragon dictation software. Although every effort was made to ensure the accuracy of this automated transcription, some errors in transcription may have occurred.

## 2021-08-02 RX ORDER — CYCLOBENZAPRINE HCL 10 MG
10 TABLET ORAL 2 TIMES DAILY PRN
Qty: 21 TABLET | Refills: 2 | Status: SHIPPED | OUTPATIENT
Start: 2021-08-02 | End: 2021-08-12

## 2022-09-08 ENCOUNTER — OFFICE VISIT (OUTPATIENT)
Dept: NEUROLOGY | Age: 62
End: 2022-09-08
Payer: COMMERCIAL

## 2022-09-08 VITALS
WEIGHT: 226 LBS | HEIGHT: 67 IN | BODY MASS INDEX: 35.47 KG/M2 | SYSTOLIC BLOOD PRESSURE: 123 MMHG | OXYGEN SATURATION: 93 % | DIASTOLIC BLOOD PRESSURE: 79 MMHG | HEART RATE: 99 BPM

## 2022-09-08 DIAGNOSIS — I10 HYPERTENSION, UNSPECIFIED TYPE: ICD-10-CM

## 2022-09-08 DIAGNOSIS — G43.019 INTRACTABLE MIGRAINE WITHOUT AURA AND WITHOUT STATUS MIGRAINOSUS: Primary | ICD-10-CM

## 2022-09-08 DIAGNOSIS — I67.1 ANEURYSM, CEREBRAL, NONRUPTURED: ICD-10-CM

## 2022-09-08 PROCEDURE — 99214 OFFICE O/P EST MOD 30 MIN: CPT | Performed by: STUDENT IN AN ORGANIZED HEALTH CARE EDUCATION/TRAINING PROGRAM

## 2022-09-08 RX ORDER — PREDNISONE 20 MG/1
TABLET ORAL
Qty: 18 TABLET | Refills: 0 | Status: SHIPPED | OUTPATIENT
Start: 2022-09-08 | End: 2022-09-17

## 2022-09-08 RX ORDER — PROPRANOLOL HCL 60 MG
60 CAPSULE, EXTENDED RELEASE 24HR ORAL DAILY
Qty: 30 CAPSULE | Refills: 2 | Status: SHIPPED | OUTPATIENT
Start: 2022-09-08 | End: 2022-10-08

## 2022-09-08 RX ORDER — PREGABALIN 150 MG/1
150 CAPSULE ORAL DAILY
COMMUNITY
Start: 2022-07-14

## 2022-09-08 RX ORDER — BLOOD PRESSURE TEST KIT
1 KIT MISCELLANEOUS DAILY
Qty: 1 KIT | Refills: 0 | Status: SHIPPED | OUTPATIENT
Start: 2022-09-08

## 2022-09-08 RX ORDER — ACETAMINOPHEN 500 MG
1000 TABLET ORAL 2 TIMES DAILY PRN
Qty: 120 TABLET | Refills: 0 | Status: SHIPPED | OUTPATIENT
Start: 2022-09-08 | End: 2022-10-08

## 2022-09-08 NOTE — PATIENT INSTRUCTIONS
Get a repeat Head CT. Try to avoid taking naproxen. Recommend stop smoking. Take prednisone taper for 9 days to help stop current headache; you will take 60 mg for the first 3 days, 40 mg for the following 3 days, and lastly 20 mg for the last 3 days. Start taking propranolol every day to help prevent headaches. You may take Tylenol to treat a recurrent migraine; do not take more than the maximum amount recommended on the bottle. Make an appointment with Neuro-endovascular specialist Dr. Yaritza Fields for follow up and management of aneurysm. Follow up in 3 months.

## 2022-09-08 NOTE — PROGRESS NOTES
[address] [place] [address] Dr MIGUELANGEL Lara  [facility] [address], [address] [facility] [address]  Dept: [phone]    PATIENT NAME: Rosalba Richardson  PATIENT MRN: [id number]  PRIMARY CARE PHYSICIAN: Isael Ordoñez MD    HPI:      History obtained from patient and EMR. Rosalba Richardson is a 64 y.o. female with past medical history of scoliosis, asymptomatic heart murmur, obesity, DJD status post back surgery x4, prediabetes, arthritis, COPD, current smoker (1ppd, about 48 years), SAH, intracranial aneurysms, who presents to clinic today for evaluation/management of headaches. Patient has history of chronic headaches but reportedly did not start having migraines until about 3 years ago. At that time pt had sudden onset posterior headache which resulted in hospitalization where patient found to have spontaneous subarachnoid hemorrhage and an associated incidental finding of intracerebral aneurysms. Patient has had nonstop migraine symptoms. She reports typical headaches feel like \"her head is exploding,\" are about a day long but can last up to 1 month at a time, occur 25 days on average in 1 month, and are associated with photophobia, phonophobia, and tinnitus (although she also states tinnitus is chronic and constant). Pain is bilateral\" predominantly left side located in frontal and occipital areas. Patient has come to establish care in neurology clinic due to uncontrolled headaches which have been progressive she denies any acute recent changes in nature of headaches. Has tried \"caffeine tablets\" in past which did not work. HA worsened with recent viral infection (now resolved). Has chronic neck pain from DJD but HA pain does not radiate into neck. Does use naproxen daily for arthritic pain. Denies head trauma but had trauma to neck 1 month ago after fall with hit to back of neck with windowsill; pt had ED visit at that time (4/2021) with negative workup.   Denies HA aura.    Patient also has sig anxiety from diagnosis of intracerebral aneurysms and states she has been [place] in fear of death\" for past 3 years. Reports she had angiogram for aneurysms but patient told she had \"curvy\" vessels and no intervention for aneurysms was done. This is influenced by [facility] of mother passing away due to brain aneurysm. No FMHx of ICH, migraines, or strokes. Pt previously seen by Dr. Jaqueline Cardenas 1/2021; per documentation from visit pt had previously tried OTC, Topamax,       Other:  > Also reports episode of bleeding in ears but is following with ENT and has an upcoming appointment this 21st of September. > Patient does have nerve pain in bilateral hands and ankles but this is chronic and 2/2 DJD and associated headaches. Was previously on gabapentin but recently discontinued it due to interaction with other meds that she's on. PREVIOUS WORKUP:     CT C-spine wo contrast 4/2021: negative for any acute abnormality  MRI brain with and without contrast 2/5/2021: No acute intracranial abnormality. No mass-effect or abnormal intracranial enhancement. Minimal chronic microvascular disease. Mild bilateral mastoid effusions. MRA head with and without contrast 3/10/2021: 2 mm saccular aneurysm at the anterior aspect of the cavernous segment of the left internal carotid artery. 2 mm prominent infundibulum at the origin of the right posterior communicating artery. Angiogram 7/29/2019: Left-sided anterior genu cavernous ICA aneurysm noted measuring 2.06 x 1.49 x 1.4 mm, left-sided posterior genu cavernous ICA aneurysm measuring 2.8 x 2.8 x 2.2 mm, right-sided infundibulum noted at origin of posterior communicating artery, no clear evidence of vasospasm or CVS  MRI brain without contrast 7/28/2019: Confirmed SAH near vertex on the right. MRA head without contrast 7/28/2019: Unremarkable given motion artifact.   Fluoroscopic guided lumbar puncture L3/L4 7/29/2019: 5 mL slightly blood-tinged CSF collected    Past Medical History:   Diagnosis Date    Adenomatous polyp 5/1/2017    Arthritis     DDD    Chronic prescription opiate use 10/11/2018    Depression     Generalized anxiety disorder 1/22/2016    Hyperlipidemia     Methadone dependence ([facility] 1/22/2016    Severe episode of recurrent major depressive disorder, without psychotic features ([facility]     Subarachnoid hemorrhage ([facility] 7/28/2019    Tobacco use disorder, continuous 1/22/2016        Past Surgical History:   Procedure Laterality Date    ANKLE FRACTURE SURGERY Left     APPENDECTOMY      BACK SURGERY      fusion, hardware. Dr. Oneill Greek    COLONOSCOPY  05/04/2017    HYPERPLASTIC POLYP       COLONOSCOPY  04/24/2017    ADENOMATOUS POLYP. DILATION AND CURETTAGE OF UTERUS      FOOT SURGERY      KNEE SURGERY Bilateral     [place] COLONOSCOPY W/BIOPSY SINGLE/MULTIPLE N/A 4/24/2017    COLONOSCOPY WITH BIOPSY performed by Zina Philippe MD at [address] [place] [address] COLONOSCOPY W/BIOPSY SINGLE/MULTIPLE N/A 5/4/2017    COLONOSCOPY WITH BIOPSY performed by Zina Philippe MD at [facility] EGD TRANSORAL BIOPSY SINGLE/MULTIPLE N/A 5/2/2017    EGD BIOPSY performed by Zina Philippe MD at [facility] ENDOSCOPY  05/02/2017        Social History     Socioeconomic History    Marital status:      Spouse name: Not on file    Number of children: Not on file    Years of education: Not on file    Highest education level: Not on file   Occupational History    Not on file   Tobacco Use    Smoking status: Every Day     Packs/day: 1.00     Years: 40.00     Pack years: 40.00     Types: Cigarettes    Smokeless tobacco: Never    Tobacco comments:     almost off cigarettes, but now vaping. Substance and Sexual Activity    Alcohol use: No     Alcohol/week: 0.0 standard drinks     Comment: denies any heavy use.      Drug use: Yes     Types: Marijuana Lucianne Bars)     Comment: Medical marijuana card verified. once a day    Sexual activity: Not Currently     Partners: Male     Comment: spouse    Other Topics Concern    Not on file   Social History Narrative    Not on file     Social Determinants of Health     Financial Resource Strain: Not on file   Food Insecurity: Not on file   Transportation Needs: Not on file   Physical Activity: Not on file   Stress: Not on file   Social Connections: Not on file   Intimate Partner Violence: Not on file   Housing Stability: Not on file        Current Outpatient Medications   Medication Sig Dispense Refill    propranolol (INDERAL LA) 60 MG extended release capsule Take 1 capsule by mouth daily 30 capsule 2    acetaminophen (TYLENOL) 500 MG tablet Take 2 tablets by mouth 2 times daily as needed for Pain 120 tablet 0    Blood Pressure KIT 1 Units by Does not apply route daily 1 kit 0    predniSONE (DELTASONE) 20 MG tablet Take 3 tablets by mouth daily for 3 days, THEN 2 tablets daily for 3 days, THEN 1 tablet daily for 3 days. 18 tablet 0    sertraline (ZOLOFT) 100 MG tablet TAKE 1 AND 1/2 TABLET BY MOUTH DAILY 135 tablet 0    naproxen sodium (ANAPROX) 550 MG tablet Take 1 tablet by mouth 2 times daily as needed for Pain 20 tablet 4    prazosin (MINIPRESS) 1 MG capsule Take 1 capsule by mouth nightly 30 capsule 5    ergocalciferol (ERGOCALCIFEROL) 1.25 MG (43824 UT) capsule Take 50,000 Units by mouth once a week      bisacodyl (BISACODYL) 5 MG EC tablet Follow instructions provided given by the physician's office. 4 tablet 0    pregabalin (LYRICA) 150 MG capsule Take 150 mg by mouth daily. No current facility-administered medications for this visit. Allergies   Allergen Reactions    Penicillins Rash        REVIEW OF SYSTEMS:     Review of Systems   HENT:  Positive for ear discharge (bleeding [id number]) and tinnitus. Eyes:  Positive for photophobia and visual disturbance.    Musculoskeletal:  Positive for arthralgias, back pain, gait problem (due to arhtralgias) duration for past 3 years since [facility] associated HA episode. Per chart review, pt has previously been on magnesium, gabapentin, topamax, OTC meds, fioricet, and been given a bilateral occipital nerve block (per note from 1/2021). Pt has not recently been on any medicines for HA though. She also has chronic daily use of naproxen for chronic pain and arthritis. Pt expressed significant anxiety associated with cerebral aneurysms. Last CT Head done 03/2021 and negative for any acute intracranial abnormalities. Pt has failed migraine medication in past but reports no recent or current medical management of HA's. Impression is pt has migraine without auras with likely concomitant medication overuse headache. Plan is steroid taper to break current HA pattern with likely additional benefit of helping with arthritic pain and pt can use tylenol as abortive medication thereafter. Will start on propranolol as prophylactic medication choice. Pt also to start HA and BP log. Given hx of intracranial aneurysms, SAH, and chronic HA's, pt also advised to limit naproxen use and avoid using anti-platelet medications. Given complaint of visual disturbance and last CT over 1 year ago, will do repeat head CT. Given pt's sig anxiety related to aneurysm diagnosis, sig time spent educating pt on modifiable risk factors for cerebral aneurysm rupture including counseling on smoking cessation and BP management. Will defer management of intracranial aneurysms to Endovascular team and refer pt to Neurologist who did pt's past angiogram.       1. Intractable migraine without aura and without status migrainosus  -     propranolol (INDERAL LA) 60 MG extended release capsule; Take 1 capsule by mouth daily, Disp-30 capsule, R-2Normal  -     acetaminophen (TYLENOL) 500 MG tablet; Take 2 tablets by mouth 2 times daily as needed for Pain, Disp-120 tablet, R-0Normal  -     CT HEAD WO CONTRAST; Future  -     predniSONE (DELTASONE) 20 MG tablet;  Take 3 tablets by mouth daily for 3 days, THEN 2 tablets daily for 3 days, THEN 1 tablet daily for 3 days. , Disp-18 tablet, R-0Normal  2. Aneurysm, cerebral, nonruptured  -     Maegan Anderson MD, Neuro-Endovascular Surgery, [facility] education done  3. Hypertension, unspecified type  -     Blood Pressure KIT; DAILY Starting Thu 9/8/2022, Disp-1 kit, R-0, Normal   4 . Tobacco abuse  -     Smoking cessation education done. Pt offered Rx for agents such as nicotine patch/gum to aid in smoking cessation but pt preference is to try to cut back on her own. Naproxen med overuse headache    Pt instructions:  Get a repeat Head CT. Try to avoid taking Naproxen. Recommend stop smoking. Take prednisone taper for 9 days to help stop current headache; you will take 60 mg for the first 3 days, 40 mg for the following 3 days, and lastly 20 mg for the last 3 days. Start taking propranolol every day to help prevent headaches. You may take Tylenol to treat a recurrent migraine; do not take more than the maximum amount recommended on the bottle. Make an appointment with Neuro-endovascular specialist Dr. Ralf Forrester for follow up and management of aneurysm. Follow up in 3 months. - I have independently reviewed all pertinent labs, imaging, reports, and consultant notes. - I have discussed the diagnosis, prognosis, risks, and goals of therapy, when appropriate, with patient and answered all of the patient's questions/concerns.     Soumya Buck DO   Neurology PGY2 Resident  [facility].

## 2022-09-11 ASSESSMENT — ENCOUNTER SYMPTOMS
PHOTOPHOBIA: 1
BACK PAIN: 1

## 2022-10-10 ENCOUNTER — TELEPHONE (OUTPATIENT)
Dept: NEUROSURGERY | Age: 62
End: 2022-10-10

## 2022-10-10 NOTE — LETTER
[address] [facility], [address] [place] [address] [address] [place] [address]  Phone: [phone]  Fax: [phone]    Vinay Vaughn MD        October 10, 2022     Maribel Holbrook  [address] [place] [facility] [address]      Dear Gary Cardenas: We missed seeing you for a scheduled appointment at [address] [place] [address] with Vinay Vaughn MD on 10/10/2022. We're sorry you were unable to keep your appointment and hope that you are doing well. We ask that you please call 24 hours in advance if you are unable to make your appointment, so that we can give that time to another patient in need. We care about you and the management of your healthcare and want to make sure that you follow up as recommended. To provide quality care and timely appointments to all our patients, you may be dismissed from the practice if you do not show for three (3) scheduled appointments within a 12-month period. We would like to continue treating your healthcare needs. Please call the office to reschedule your appointment, if needed.      Sincerely,        Vinay Vaughn MD

## (undated) DEVICE — BRUSH PRESOAK CLEAN BACTERIOSTATIC DUAL

## (undated) DEVICE — JELLY,LUBE,STERILE,FLIP TOP,TUBE,2-OZ: Brand: MEDLINE

## (undated) DEVICE — Device: Brand: SPOT ENDOSOPIC MARKER

## (undated) DEVICE — FORCEPS BX L240CM WRK CHN 2.8MM STD CAP W/ NDL MIC MESH

## (undated) DEVICE — AIRLIFE™ NASAL OXYGEN CANNULA CURVED, FLARED TIP, WITH 7 FEET (2.1 M) CRUSH RESISTANT TUBING, OVER-THE-EAR STYLE: Brand: AIRLIFE™

## (undated) DEVICE — 60 ML SYRINGE REGULAR TIP: Brand: MONOJECT

## (undated) DEVICE — SPONGE GZ W4XL4IN COT 4 PLY WVN

## (undated) DEVICE — SOLUTION IV IRRIG WATER 1000ML POUR BRL 2F7114

## (undated) DEVICE — BLOCK BITE 60FR RUBBER ADLT DENTAL

## (undated) DEVICE — ELECTRODE PT RET AD L9FT HI MOIST COND ADH HYDRGEL CORDED

## (undated) DEVICE — MEDI-VAC NON-CONDUCTIVE SUCTION TUBING: Brand: CARDINAL HEALTH